# Patient Record
Sex: MALE | Race: WHITE | Employment: OTHER | URBAN - METROPOLITAN AREA
[De-identification: names, ages, dates, MRNs, and addresses within clinical notes are randomized per-mention and may not be internally consistent; named-entity substitution may affect disease eponyms.]

---

## 2018-01-19 ENCOUNTER — TELEPHONE (OUTPATIENT)
Dept: FAMILY MEDICINE | Facility: CLINIC | Age: 68
End: 2018-01-19

## 2018-01-19 ENCOUNTER — OFFICE VISIT (OUTPATIENT)
Dept: FAMILY MEDICINE | Facility: CLINIC | Age: 68
End: 2018-01-19
Payer: COMMERCIAL

## 2018-01-19 VITALS
OXYGEN SATURATION: 98 % | WEIGHT: 169 LBS | DIASTOLIC BLOOD PRESSURE: 80 MMHG | HEART RATE: 69 BPM | TEMPERATURE: 98.5 F | SYSTOLIC BLOOD PRESSURE: 136 MMHG | BODY MASS INDEX: 23.66 KG/M2 | HEIGHT: 71 IN

## 2018-01-19 DIAGNOSIS — J06.9 VIRAL URI WITH COUGH: Primary | ICD-10-CM

## 2018-01-19 PROCEDURE — 99213 OFFICE O/P EST LOW 20 MIN: CPT | Performed by: NURSE PRACTITIONER

## 2018-01-19 NOTE — NURSING NOTE
"Chief Complaint   Patient presents with     URI     sinus and productive cough yellow green abt 2 weeks.  OTC acetaminophen + decongestant        Initial /80 (Cuff Size: Adult Regular)  Pulse 69  Temp 98.5  F (36.9  C) (Oral)  Ht 5' 11\" (1.803 m)  Wt 169 lb (76.7 kg)  SpO2 98%  BMI 23.57 kg/m2 Estimated body mass index is 23.57 kg/(m^2) as calculated from the following:    Height as of this encounter: 5' 11\" (1.803 m).    Weight as of this encounter: 169 lb (76.7 kg).  Medication Reconciliation: complete    "

## 2018-01-19 NOTE — TELEPHONE ENCOUNTER
Patient started with Flu Like Symptoms 2 weeks ago.  Now sinus symptoms that will not get better.  Nasal drainage (yellow to green in color), sinus pressure, pain.  Post nasal drip.  Works over at [employer].  Recommended appt for evaluation.  Patient agrees and appt scheduled.  Yaz Blandon RN

## 2018-01-19 NOTE — TELEPHONE ENCOUNTER
Reason for call:  Patient reporting a symptom    Symptom or request: sore throat, congestions, drainage, body aches and chills     Duration (how long have symptoms been present): 2 weeks    Have you been treated for this before? No    Additional comments: Pt was a a Dr. Galarza pt, has not established care with a new provider    Phone Number patient can be reached at:  Cell number on file:    Telephone Information:   Mobile [phone]       Best Time:      Can we leave a detailed message on this number:  YES    Call taken on 1/19/2018 at 9:12 AM by Mildred Barahona

## 2018-01-19 NOTE — MR AVS SNAPSHOT
"              After Visit Summary   1/19/2018    DR José Antonio Saha    MRN: [id number]           Patient Information     Date Of Birth          1950        Visit Information        Provider Department      1/19/2018 3:00 PM Macy Zarco APRN CNP [facility]        Today's Diagnoses     Viral URI with cough    -  1       Follow-ups after your visit        Who to contact     If you have questions or need follow up information about today's clinic visit or your schedule please contact [facility] directly at [phone].  Normal or non-critical lab and imaging results will be communicated to you by Saatchi Arthart, letter or phone within 4 business days after the clinic has received the results. If you do not hear from us within 7 days, please contact the clinic through Saatchi Arthart or phone. If you have a critical or abnormal lab result, we will notify you by phone as soon as possible.  Submit refill requests through MiFi or call your pharmacy and they will forward the refill request to us. Please allow 3 business days for your refill to be completed.          Additional Information About Your Visit        MyChart Information     MiFi gives you secure access to your electronic health record. If you see a primary care provider, you can also send messages to your care team and make appointments. If you have questions, please call your primary care clinic.  If you do not have a primary care provider, please call [phone] and they will assist you.        Care EveryWhere ID     This is your Care EveryWhere ID. This could be used by other organizations to access your [place] medical records  [id number]        Your Vitals Were     Pulse Temperature Height Pulse Oximetry BMI (Body Mass Index)       69 98.5  F (36.9  C) (Oral) 5' 11\" (1.803 m) 98% 23.57 kg/m2        Blood Pressure from Last 3 Encounters:   01/19/18 136/80   09/29/16 105/71   09/08/16 106/65    Weight from Last 3 " Encounters:   01/19/18 169 lb (76.7 kg)   07/29/16 168 lb (76.2 kg)   06/12/14 168 lb (76.2 kg)              Today, you had the following     No orders found for display       Primary Care Provider Office Phone # Fax #    Blaine Lopez Iker Galarza [phone] [address] [facility] [address]  [facility] [address]        Equal Access to Services     JAMES COSME : Hadii aad ku hadasho Soomaali, waaxda luqadaha, qaybta kaalmada adeegyada, waxay idiin hayaan adeeg kharash la'josé antonio . So [facility] [phone].    ATENCIÓN: Si habla español, tiene a yuan disposición servicios gratuitos de asistencia lingüística. Llame al [phone].    We comply with applicable federal civil rights laws and [place] laws. We do not discriminate on the basis of race, color, national origin, age, disability, sex, sexual orientation, or gender identity.            Thank you!     Thank you for choosing [facility]  for your care. Our goal is always to provide you with excellent care. Hearing back from our patients is one way we can continue to improve our services. Please take a few minutes to complete the written survey that you may receive in the mail after your visit with us. Thank you!             Your Updated Medication List - Protect others around you: Learn how to safely use, store and throw away your medicines at www.disposemymeds.org.          This list is accurate as of: 1/19/18  3:56 PM.  Always use your most recent med list.                   Brand Name Dispense Instructions for use Diagnosis    NO ACTIVE MEDICATIONS

## 2018-05-03 ENCOUNTER — RADIANT APPOINTMENT (OUTPATIENT)
Dept: GENERAL RADIOLOGY | Facility: CLINIC | Age: 68
End: 2018-05-03
Attending: INTERNAL MEDICINE
Payer: COMMERCIAL

## 2018-05-03 ENCOUNTER — OFFICE VISIT (OUTPATIENT)
Dept: FAMILY MEDICINE | Facility: CLINIC | Age: 68
End: 2018-05-03
Payer: COMMERCIAL

## 2018-05-03 VITALS
SYSTOLIC BLOOD PRESSURE: 117 MMHG | DIASTOLIC BLOOD PRESSURE: 75 MMHG | HEIGHT: 71 IN | HEART RATE: 52 BPM | BODY MASS INDEX: 23.94 KG/M2 | TEMPERATURE: 96.5 F | OXYGEN SATURATION: 100 % | WEIGHT: 171 LBS

## 2018-05-03 DIAGNOSIS — M25.511 ACUTE PAIN OF RIGHT SHOULDER: ICD-10-CM

## 2018-05-03 DIAGNOSIS — M25.511 ACUTE PAIN OF RIGHT SHOULDER: Primary | ICD-10-CM

## 2018-05-03 DIAGNOSIS — Z23 NEED FOR VACCINATION: ICD-10-CM

## 2018-05-03 DIAGNOSIS — N40.1 BENIGN PROSTATIC HYPERPLASIA WITH NOCTURIA: ICD-10-CM

## 2018-05-03 DIAGNOSIS — Z12.5 SCREENING FOR PROSTATE CANCER: ICD-10-CM

## 2018-05-03 DIAGNOSIS — Z13.6 CARDIOVASCULAR SCREENING; LDL GOAL LESS THAN 160: ICD-10-CM

## 2018-05-03 DIAGNOSIS — R35.1 BENIGN PROSTATIC HYPERPLASIA WITH NOCTURIA: ICD-10-CM

## 2018-05-03 PROCEDURE — 73030 X-RAY EXAM OF SHOULDER: CPT | Mod: RT

## 2018-05-03 PROCEDURE — 99207 ZZC FOR CODING REVIEW: CPT | Mod: 25 | Performed by: INTERNAL MEDICINE

## 2018-05-03 PROCEDURE — 90471 IMMUNIZATION ADMIN: CPT | Performed by: INTERNAL MEDICINE

## 2018-05-03 PROCEDURE — 99214 OFFICE O/P EST MOD 30 MIN: CPT | Mod: 25 | Performed by: INTERNAL MEDICINE

## 2018-05-03 PROCEDURE — 90670 PCV13 VACCINE IM: CPT | Performed by: INTERNAL MEDICINE

## 2018-05-03 RX ORDER — TAMSULOSIN HYDROCHLORIDE 0.4 MG/1
0.4 CAPSULE ORAL DAILY
Qty: 90 CAPSULE | Refills: 3 | Status: SHIPPED | OUTPATIENT
Start: 2018-05-03 | End: 2019-11-20

## 2018-05-03 NOTE — NURSING NOTE
"Chief Complaint   Patient presents with     Musculoskeletal Problem       Initial /75 (BP Location: Left arm, Patient Position: Sitting, Cuff Size: Adult Regular)  Pulse 52  Temp 96.5  F (35.8  C) (Oral)  Ht 5' 11\" (1.803 m)  Wt 171 lb (77.6 kg)  SpO2 100%  BMI 23.85 kg/m2 Estimated body mass index is 23.85 kg/(m^2) as calculated from the following:    Height as of this encounter: 5' 11\" (1.803 m).    Weight as of this encounter: 171 lb (77.6 kg).  Medication Reconciliation: complete    Richard Marsh CMA on 5/3/2018 at 12:37 PM    "

## 2018-05-03 NOTE — PROGRESS NOTES
SUBJECTIVE:   José Antonio Saha is a 68 year old male who presents to clinic today for the following health issues:      Musculoskeletal problem/pain      Duration: started early march    Description  Location: Right shoulder    Intensity:  mild    Accompanying signs and symptoms: none    History  Previous similar problem: YES- same time of year  Previous evaluation:  none    Precipitating or alleviating factors:  Trauma or overuse: YES- overuse  Aggravating factors include: work    Therapies tried and outcome: immobilization and Ibuprofen    Pt requesting PSA screen due to family history of prostatic carcinoma.    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  The pain came on after falling on outstretched right arm while cross-country skiing.  The pain seems to be noticeable when he puts weight on his right arm on his bicycle handlebars.  The pain does not seem to worsen with aerobic exertion.      Patient is a family history of prostate cancer and requests PSA screening.  He also notes progressive, now severe nocturia symptoms that have been progressing over more than 5 years.  He gets up 1 or 2 times per night to urinate.  He is interested in drug therapy to help with this.  He describes a normal urine stream and denies dysuria or hematuria.  He tries to limit liquids late in the evening.      Problem list and histories reviewed & adjusted, as indicated.  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     C NONSPECIFIC PROCEDURE      multiple skin Bxs     C NONSPECIFIC PROCEDURE      tonsillectomy     COLONOSCOPY  11/19/2013    Procedure: COMBINED COLONOSCOPY, SINGLE BIOPSY/POLYPECTOMY BY BIOPSY;  COLONOSCOPY;  Surgeon: Adam Lopez MD;  Location:  GI     COLONOSCOPY N/A 9/29/2016    Procedure: COLONOSCOPY;  Surgeon: Paul Ken MD;  Location:  GI       Social History   Substance Use Topics     Smoking status: Never Smoker     Smokeless tobacco: Never Used     Alcohol use 0.0 oz/week     0 Standard drinks or equivalent per week      Comment: 2  drinks per week     Family History   Problem Relation Age of Onset     CANCER Mother      thyroid and lung     Prostate Cancer Father 65     uncle also had prostate Ca     CEREBROVASCULAR DISEASE Father      Hypertension Father      Arthritis Other      Breast Cancer Sister      Cancer - colorectal No family hx of      DIABETES No family hx of          Current Outpatient Prescriptions   Medication Sig Dispense Refill     NO ACTIVE MEDICATIONS        tamsulosin (FLOMAX) 0.4 MG capsule Take 1 capsule (0.4 mg) by mouth daily 90 capsule 3     Allergies   Allergen Reactions     No Known Drug Allergies        Reviewed and updated as needed this visit by clinical staff       Reviewed and updated as needed this visit by Provider         ROS:  Constitutional, HEENT, cardiovascular, pulmonary, gi and gu systems are negative, except as otherwise noted.    OBJECTIVE:     /75 (BP Location: Left arm, Patient Position: Sitting, Cuff Size: Adult Regular)  Pulse 52  Temp 96.5  F (35.8  C) (Oral)  Ht 5' 11\" (1.803 m)  Wt 171 lb (77.6 kg)  SpO2 100%  BMI 23.85 kg/m2  Body mass index is 23.85 kg/(m^2).  General: This is a well-appearing middle-aged man in no acute distress.  He is physically fit.  Shoulder: There is no bony tenderness over the structures of the right shoulder " joint, there is no tenderness with palpation over the acromioclavicular joint.  There is a positive impingement sign of the right shoulder.  Muscle strength of the muscles of the rotator cuff is intact on the right shoulder.    X-ray of the right shoulder and labs are pending    ASSESSMENT/PLAN:       1. Acute pain of right shoulder  Suspect rotator cuff syndrome of the right shoulder joint which will improve with physical therapy.  X-ray to exclude fracture or metastatic disease although this is very unlikely.  Hopefully, symptoms will improve with several sessions of physical therapy.  Patient will notify me if symptoms are not improved after 3-4 weeks of therapy to schedule MRI to further evaluate if needed.  - [facility] HAND, AND CHIROPRACTIC REFERRAL  - XR Shoulder Right 2 Views; Future    2. Benign prostatic hyperplasia with nocturia  Discussed risks and side effects of Flomax to help with nocturia, after discussion, patient would like to try Flomax  - tamsulosin (FLOMAX) 0.4 MG capsule; Take 1 capsule (0.4 mg) by mouth daily  Dispense: 90 capsule; Refill: 3    3. CARDIOVASCULAR SCREENING; LDL GOAL LESS THAN 160  He would like to return for fasting screening laboratory tests  - Comprehensive metabolic panel; Future  - CBC with platelets; Future  - Lipid panel reflex to direct LDL Fasting; Future    4. Screening for prostate cancer    - Prostate spec antigen screen; Future    FUTURE APPOINTMENTS:       - Pending labs and symptoms as above    Prevnar 13 today, she works was recommended  Kirk Chavez MD  [facility]

## 2018-05-03 NOTE — NURSING NOTE
Screening Questionnaire for Adult Immunization    Are you sick today?   No   Do you have allergies to medications, food, a vaccine component or latex?   No   Have you ever had a serious reaction after receiving a vaccination?   No   Do you have a long-term health problem with heart disease, lung disease, asthma, kidney disease, metabolic disease (e.g. diabetes), anemia, or other blood disorder?   No   Do you have cancer, leukemia, HIV/AIDS, or any other immune system problem?   No   In the past 3 months, have you taken medications that affect  your immune system, such as prednisone, other steroids, or anticancer drugs; drugs for the treatment of rheumatoid arthritis, Crohn s disease, or psoriasis; or have you had radiation treatments?   No   Have you had a seizure, or a brain or other nervous system problem?   No   During the past year, have you received a transfusion of blood or blood     products, or been given immune (gamma) globulin or antiviral drug?   No   For women: Are you pregnant or is there a chance you could become        pregnant during the next month?   No   Have you received any vaccinations in the past 4 weeks?   No     Immunization questionnaire answers were all negative.        Per orders of Dr. Chavez, injection of Prevnar 13 given by Richard Marsh. Patient instructed to remain in clinic for 15 minutes afterwards, and to report any adverse reaction to me immediately.       Screening performed by Richard Marsh on 5/3/2018 at 1:24 PM.    Prior to injection verified patient identity using patient's name and date of birth.  Due to injection administration, patient instructed to remain in clinic for 15 minutes  afterwards, and to report any adverse reaction to me immediately.

## 2018-05-03 NOTE — MR AVS SNAPSHOT
"              After Visit Summary   5/3/2018    DR Chou ZHANNA Saha    MRN: [id number]           Patient Information     Date Of Birth          1950        Visit Information        Provider Department      5/3/2018 12:30 PM Kirk Chavez MD [facility]        Today's Diagnoses     Benign prostatic hyperplasia with nocturia    -  1    Acute pain of right shoulder        CARDIOVASCULAR SCREENING; LDL GOAL LESS THAN 160        Screening for prostate cancer          Care Instructions    You should get the new shingles vaccine \"SHINGRIX\" (not Zostavax) at your pharmacy.             Follow-ups after your visit        Additional Services     [facility] PT, HAND, AND CHIROPRACTIC REFERRAL       **This order will print in the [facility] Scheduling Office**    Physical Therapy, Hand Therapy and Chiropractic Care are available through:    *[facility]  *[facility]  *[facility]    Call one number to schedule at any of the above locations: [phone].    Your provider has referred you to: Physical Therapy at [facility] or [facility]    Indication/Reason for Referral: Shoulder Pain  Onset of Illness: Months ago   Therapy Orders: Evaluate and Treat  Special Programs:   Special Request:     Marilyn Sanders      Additional Comments for the Therapist or Chiropractor:     Please be aware that coverage of these services is subject to the terms and limitations of your health insurance plan.  Call member services at your health plan with any benefit or coverage questions.      Please bring the following to your appointment:    *Your personal calendar for scheduling future appointments  *Comfortable clothing                  Follow-up notes from your care team     Return for Lab Work.      Your next 10 appointments already scheduled     May 07, 2018  8:00 AM CDT   (Arrive by 7:45 AM)   [facility] Chiropractor with CAMILLE Dorman ([facility]  )    [address] [facility] [address] [facility] [id number]  [place] [address]   [phone]              Future tests that were ordered for you today     Open Future Orders        Priority Expected Expires Ordered    Comprehensive metabolic panel Routine  5/3/2019 5/3/2018    CBC with platelets Routine  5/3/2019 5/3/2018    Prostate spec antigen screen Routine  5/3/2019 5/3/2018    Lipid panel reflex to direct LDL Fasting Routine  5/3/2019 5/3/2018    XR Shoulder Right 2 Views Routine 5/3/2018 5/3/2019 5/3/2018            Who to contact     If you have questions or need follow up information about today's clinic visit or your schedule please contact [facility] directly at [phone].  Normal or non-critical lab and imaging results will be communicated to you by [email or web address], letter or phone within 4 business days after the clinic has received the results. If you do not hear from us within 7 days, please contact the clinic through EcoloCapt or phone. If you have a critical or abnormal lab result, we will notify you by phone as soon as possible.  Submit refill requests through SwipeStation or call your pharmacy and they will forward the refill request to us. Please allow 3 business days for your refill to be completed.          Additional Information About Your Visit        SwipeStation Information     SwipeStation gives you secure access to your electronic health record. If you see a primary care provider, you can also send messages to your care team and make appointments. If you have questions, please call your primary care clinic.  If you do not have a primary care provider, please call [phone] and they will assist you.        Care EveryWhere ID     This is your Care EveryWhere ID. This could be used by other organizations to access your [place] medical records  [id number]        Your Vitals Were     Pulse Temperature Height Pulse Oximetry BMI (Body Mass Index)       52 96.5  F (35.8  C) (Oral) 5' 11\" (1.803 m) 100% 23.85 kg/m2        Blood Pressure from Last 3 " Encounters:   05/03/18 117/75   01/19/18 136/80   09/29/16 105/71    Weight from Last 3 Encounters:   05/03/18 171 lb (77.6 kg)   01/19/18 169 lb (76.7 kg)   07/29/16 168 lb (76.2 kg)              We Performed the Following     [facility] PT, HAND, AND CHIROPRACTIC REFERRAL          Today's Medication Changes          These changes are accurate as of 5/3/18  1:09 PM.  If you have any questions, ask your nurse or doctor.               Start taking these medicines.        Dose/Directions    tamsulosin 0.4 MG capsule   Commonly known as:  FLOMAX   Used for:  Benign prostatic hyperplasia with nocturia   Started by:  Kirk Chavez MD        Dose:  0.4 mg   Take 1 capsule (0.4 mg) by mouth daily   Quantity:  90 capsule   Refills:  3            Where to get your medicines      These medications were sent to [facility], [place] - [address], [address]  [address], [address], [facility] [address]     Phone:  [phone]     tamsulosin 0.4 MG capsule                Primary Care Provider Office Phone # Fax #    Gyjqcqav John Galarza [phone] [address]  [facility] [address]        Equal Access to Services     JAMES COSME AH: Hadii aad ku hadasho Soomaali, waaxda luqadaha, qaybta kaalmada adeegyada, waxay radhain hayjosé antonio conde . So [facility] [phone].    ATENCIÓN: Si habla español, tiene a yuan disposición servicios gratuitos de asistencia lingüística. Llame al [phone].    We comply with applicable federal civil rights laws and [place] laws. We do not discriminate on the basis of race, color, national origin, age, disability, sex, sexual orientation, or gender identity.            Thank you!     Thank you for choosing [facility]  for your care. Our goal is always to provide you with excellent care. Hearing back from our patients is one way we can continue to improve our services. Please take a few minutes to complete the  written survey that you may receive in the mail after your visit with us. Thank you!             Your Updated Medication List - Protect others around you: Learn how to safely use, store and throw away your medicines at www.disposemymeds.org.          This list is accurate as of 5/3/18  1:09 PM.  Always use your most recent med list.                   Brand Name Dispense Instructions for use Diagnosis    NO ACTIVE MEDICATIONS           tamsulosin 0.4 MG capsule    FLOMAX    90 capsule    Take 1 capsule (0.4 mg) by mouth daily    Benign prostatic hyperplasia with nocturia

## 2018-05-05 NOTE — PROGRESS NOTES
The following letter pertains to your most recent diagnostic tests:    Good news! Your shoulder does not show any other dangerous causes for your shoulder pain and confirms our hypothesis that rotator cuff tendinitis is the cause for your symptoms.  I would recommend a few sessions of PT for this problem.  If the PT fails to address your symptoms, then please contact me to arrange for a possible MRI or cortisone injection as symptoms dictate.       Sincerely,    Dr. Chavez

## 2018-05-07 ENCOUNTER — THERAPY VISIT (OUTPATIENT)
Dept: CHIROPRACTIC MEDICINE | Facility: CLINIC | Age: 68
End: 2018-05-07
Payer: COMMERCIAL

## 2018-05-07 DIAGNOSIS — M25.511 RIGHT SHOULDER PAIN, UNSPECIFIED CHRONICITY: Primary | ICD-10-CM

## 2018-05-07 DIAGNOSIS — M99.01 CERVICAL SEGMENT DYSFUNCTION: ICD-10-CM

## 2018-05-07 DIAGNOSIS — M75.91 RIGHT SUPRASPINATUS TENDINITIS: ICD-10-CM

## 2018-05-07 DIAGNOSIS — M99.02 THORACIC SEGMENT DYSFUNCTION: ICD-10-CM

## 2018-05-07 PROCEDURE — 99203 OFFICE O/P NEW LOW 30 MIN: CPT | Mod: 25 | Performed by: CHIROPRACTOR

## 2018-05-07 PROCEDURE — 97035 APP MDLTY 1+ULTRASOUND EA 15: CPT | Performed by: CHIROPRACTOR

## 2018-05-07 PROCEDURE — 98940 CHIROPRACT MANJ 1-2 REGIONS: CPT | Mod: AT | Performed by: CHIROPRACTOR

## 2018-05-08 ENCOUNTER — ALLIED HEALTH/NURSE VISIT (OUTPATIENT)
Dept: NURSING | Facility: CLINIC | Age: 68
End: 2018-05-08
Payer: COMMERCIAL

## 2018-05-08 DIAGNOSIS — Z23 NEED FOR VACCINATION: Primary | ICD-10-CM

## 2018-05-08 DIAGNOSIS — Z13.6 CARDIOVASCULAR SCREENING; LDL GOAL LESS THAN 160: ICD-10-CM

## 2018-05-08 DIAGNOSIS — Z12.5 SCREENING FOR PROSTATE CANCER: ICD-10-CM

## 2018-05-08 LAB
ALBUMIN SERPL-MCNC: 4.1 G/DL (ref 3.4–5)
ALP SERPL-CCNC: 45 U/L (ref 40–150)
ALT SERPL W P-5'-P-CCNC: 35 U/L (ref 0–70)
ANION GAP SERPL CALCULATED.3IONS-SCNC: 8 MMOL/L (ref 3–14)
AST SERPL W P-5'-P-CCNC: 25 U/L (ref 0–45)
BILIRUB SERPL-MCNC: 0.7 MG/DL (ref 0.2–1.3)
BUN SERPL-MCNC: 18 MG/DL (ref 7–30)
CALCIUM SERPL-MCNC: 9 MG/DL (ref 8.5–10.1)
CHLORIDE SERPL-SCNC: 109 MMOL/L (ref 94–109)
CHOLEST SERPL-MCNC: 191 MG/DL
CO2 SERPL-SCNC: 25 MMOL/L (ref 20–32)
CREAT SERPL-MCNC: 0.94 MG/DL (ref 0.66–1.25)
ERYTHROCYTE [DISTWIDTH] IN BLOOD BY AUTOMATED COUNT: 14.4 % (ref 10–15)
GFR SERPL CREATININE-BSD FRML MDRD: 81 ML/MIN/1.7M2
GLUCOSE SERPL-MCNC: 102 MG/DL (ref 70–99)
HCT VFR BLD AUTO: 43 % (ref 40–53)
HDLC SERPL-MCNC: 80 MG/DL
HGB BLD-MCNC: 14.7 G/DL (ref 13.3–17.7)
LDLC SERPL CALC-MCNC: 99 MG/DL
MCH RBC QN AUTO: 32.7 PG (ref 26.5–33)
MCHC RBC AUTO-ENTMCNC: 34.2 G/DL (ref 31.5–36.5)
MCV RBC AUTO: 96 FL (ref 78–100)
NONHDLC SERPL-MCNC: 111 MG/DL
PLATELET # BLD AUTO: 297 10E9/L (ref 150–450)
POTASSIUM SERPL-SCNC: 4.5 MMOL/L (ref 3.4–5.3)
PROT SERPL-MCNC: 7.7 G/DL (ref 6.8–8.8)
PSA SERPL-ACNC: 6.16 UG/L (ref 0–4)
RBC # BLD AUTO: 4.49 10E12/L (ref 4.4–5.9)
SODIUM SERPL-SCNC: 142 MMOL/L (ref 133–144)
TRIGL SERPL-MCNC: 60 MG/DL
WBC # BLD AUTO: 4.8 10E9/L (ref 4–11)

## 2018-05-08 PROCEDURE — 36415 COLL VENOUS BLD VENIPUNCTURE: CPT | Performed by: INTERNAL MEDICINE

## 2018-05-08 PROCEDURE — 90750 HZV VACC RECOMBINANT IM: CPT

## 2018-05-08 PROCEDURE — 90471 IMMUNIZATION ADMIN: CPT

## 2018-05-08 PROCEDURE — 80061 LIPID PANEL: CPT | Performed by: INTERNAL MEDICINE

## 2018-05-08 PROCEDURE — 99207 ZZC NO CHARGE NURSE ONLY: CPT

## 2018-05-08 PROCEDURE — 85027 COMPLETE CBC AUTOMATED: CPT | Performed by: INTERNAL MEDICINE

## 2018-05-08 PROCEDURE — 80053 COMPREHEN METABOLIC PANEL: CPT | Performed by: INTERNAL MEDICINE

## 2018-05-08 PROCEDURE — G0103 PSA SCREENING: HCPCS | Performed by: INTERNAL MEDICINE

## 2018-05-08 NOTE — PROGRESS NOTES
Chiropractic Clinic Visit    PCP: Kirk Chavez    José Antonio Saha is a 68 year old male who is seen  as a self referral presenting with acute R shoulder pain . Patient reports that the onset was early March 2018.  When asked, patient denies:, falling, slipping, bending and reaching or sleeping awkwardly. The pt reports R shoulder pain that started due to an overuse injury of the R shoulder followed by biking and Nordic skiing which hollie the R shoulder. The px is located in the R lateral shoulder area and does not radiate. He also uses a microscope during the day which requires him to use fine and repetitious movements in the R shoulder area causing pain in the lateral shoulder area. When the patient bikes or skis he notes moderate pain in the shoulder area with jarring motions. The px is graded a 1-4/10 on VAS. Sleeping on either side tends to irritate the shoulder as does using the R shoulder. The pt denies weakness in the extremities or other unusual sx.  Prior to onset, the patient was able to sleep on either side. Patient notes that due to symptoms, they cannot bike or ski due to pain in the R shoulder . José Antonio Saha notes using a microscope rated at a 4/10 and  prior to this onset it was 1/10.    Injury: There was no injury related to this episode     Location of Pain: right shoulder at the following level(s) C7 , T1 , T5  and T8   Duration of Pain: March 2018   Rating of Pain at worst: 4/10  Rating of Pain Currently: 1/10  Symptoms are better with: Nothing  Symptoms are worse with: repetitious motions, skiing, biking  Additional Features: occasional neck pain       Health History  as reported by the patient:    How does the patient rate their own health:   Excellent    Current or past medical history:   Cancer    Medical allergies  None    Past Traumas/Surgeries  Cancer:  Skin biopsies     Family History  Family History   Problem Relation Age of Onset     CANCER Mother      thyroid and lung      Prostate Cancer Father 65     uncle also had prostate Ca     CEREBROVASCULAR DISEASE Father      Hypertension Father      Arthritis Other      Breast Cancer Sister      Cancer - colorectal No family hx of      DIABETES No family hx of        Medications:  None    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    Past Medical History:   Diagnosis Date     Basal cell carcinoma      Malignant melanoma of other specified sites of skin 1980s    no recurrence     Personal history of other malignant neoplasm of skin 4/15/02    recurrent BCC scalp and face     Routine general medical examination at a [facility] facility 4/12/02     Scrotal varices     L scrotum     Past Surgical History:   Procedure Laterality Date     C NONSPECIFIC PROCEDURE  6/27/02    colonoscopy with polypectomy x3, fulguration x1     C NONSPECIFIC PROCEDURE      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     C NONSPECIFIC PROCEDURE      multiple skin Bxs     C NONSPECIFIC PROCEDURE      tonsillectomy     COLONOSCOPY  11/19/2013    Procedure: COMBINED COLONOSCOPY, SINGLE BIOPSY/POLYPECTOMY BY BIOPSY;  COLONOSCOPY;  Surgeon: Adam Lopez MD;  Location:  GI     COLONOSCOPY N/A 9/29/2016    Procedure: COLONOSCOPY;  Surgeon: Paul Ken MD;  Location:  GI       Objective  There were no vitals taken for this visit.    GENERAL APPEARANCE: healthy, alert and no distress   GAIT: NORMAL  SKIN: no suspicious lesions or rashes  NEURO: Normal strength and tone, mentation intact and speech normal  PSYCH:  mentation appears normal and affect normal/bright    José Antonio was asked to complete the Neck Disability Index, today in the office. NDI Disability score: pending%; pain  severity scale: 4/10..    Cervical Spine Exam    Range of Motion:         Full active and passive ROM forward flexion,  Decreased  extension, lateral rotation, lateral flexion.    Inspection:         No visible deformity        normal lordotic curvature maintained  Slumped seated posture, anterior head carriage, poor posture    Tender:        upper border of trapezius       R longus coli, R subscapularis, R supraspinatus tendon    Non-Tender:        remainder of cervical spine area    Muscle strength:       C4 (shoulder shrug)  symmetric 5/5 Normal       C5 (shoulder abduction) symmetric 5/5 Normal       C6 (elbow flexion) symmetric 5/5 Normal       C7 (elbow extension) symmetric 5/5 Normal       C8 (finger abduction, thumb flexion) symmetric 5/5 Normal    Reflexes:        C5 (biceps) symmetric 2 bilaterally       C6 (supinator) symmetric 2 bilaterally       C7 (triceps) symmetric 2 bilaterally    Sensation:       grossly intact througout bilateral upper extremities    Special Tests:       positive (+) Spurling  Gagan's- positive, VBI- negative and Schaeffer Aragon - negative    Lymphatics:        no edema noted in the upper extremities       Segmental spinal dysfunction/restrictions found at:C7 , T1 , T5  and T8   .      The following soft tissue hypotonicities were observed:Lev scapulae: ache and dull pain, referred pain: no  Sub-occiput: ache and dull pain, referred pain: no    Trigger points were found in:none     Muscle spasm found in:Levator scapulae, Sub-occipital and subscapularis:       Radiology:  None     Assessment:    1. Right shoulder pain, unspecified chronicity    2. Cervical segment dysfunction    3. Right supraspinatus tendinitis    4. Thoracic segment dysfunction        RX ordered/plan of care  Anticipated outcomes  Possible risks and side effects    After discussing the risk and benefits of care, patient consented to treatment    Patient's condition:  Patient had restrictions  pre-manipulation    Treatment effectiveness:  Post manipulation there is better intersegmental movement and Patient claims to feel looser post manipulation    Plan:    Procedures:    Evaluation and Management:  52473 Moderate level exam 30 min    CMT:  06430 Chiropractic manipulative treatment 1-2 regions performed   Cervical: Diversified, C2, C7 , Prone, Supine  Thoracic: Diversified, T1, T5, T8, Prone    Modalities:  59618: US:  1.5 Castelan/cm squared for 8 minutes at 1 mhz, Location: R supraspinatus     Therapeutic procedures:  None    Response to Treatment  No increase in sx, pt stable     Prognosis: Good      Treatment plan and goals:  Goals:  SLEEPING: the patient specific goal is to attain his pre-injury status of 6-8 hours comfortably on either side  Moving the slide on the microscope     Frequency of care  Duration of care is estimated to be 4-6 weeks, from the initial treatment.  It is estimated that the patient will need a total of 4-6 visits to resolve this episode.  For the initial therapeutic trial of care, the frequency is recommended at 6 X week, once daily.  A reevaluation would be clinically appropriate in 6 visits, to determine progress and further course of care.    In-Office Treatment  Evaluation  Spinal Chiropractic Manipulative Therapy  US therapy  ACP discussion      Recommendations:    Instructions:none     Follow-up:  Return to care in one week with US   Postural correction  Stretching .     Disclaimer: This note consists of symbols derived from keyboarding, dictation and/or voice recognition software. As a result, there may be errors in the script that have gone undetected. Please consider this when interpreting information found in this chart.

## 2018-05-08 NOTE — PROGRESS NOTES
The following letter pertains to your most recent diagnostic tests:    -Your PSA is mildly elevated.  Most dangerous prostate cancers present with PSA greater than 10.  This mildly elevated result could be from mild inflammation of the prostate gland or it could be a spurious value.  I would recommend that we recheck the PSA in 4-6 weeks.  If the PSA at that time returns to normal or decreases, there is likely little to worry about.  If the trend of the PSA is upward, then we should arrange a urology consultation to discuss further evaluation.       -Liver and gallbladder tests are normal for you. (ALT,AST, Alk phos, bilirubin), kidney function is normal for you (Creatinine, GFR), Sodium is normal, Potassium is normal for you, Calcium is normal for you, Glucose (blood sugar) is essentially normal for you.      -Your cholesterol panel looks healthy.     -Your complete blood counts including your hemoglobin returned normal for you.         Bottom line:  We should recheck your PSA in 4-6 weeks.  The remainder of your lab results look really healthy.        Follow up:  Lab appointment 4-6 weeks for follow up PSA level.        Sincerely,    Dr. Chavez

## 2018-05-08 NOTE — LETTER
May 10, 2018      José Antonio Saha  [address] [place] [address]        Dear DrewYifan,    We are writing to inform you of your test results.    The following letter pertains to your most recent diagnostic tests:     -Your PSA is mildly elevated.  Most dangerous prostate cancers present with PSA greater than 10.  This mildly elevated result could be from mild inflammation of the prostate gland or it could be a spurious value.  I would recommend that we recheck the PSA in 4-6 weeks.  If the PSA at that time returns to normal or decreases, there is likely little to worry about.  If the trend of the PSA is upward, then we should arrange a urology consultation to discuss further evaluation.       -Liver and gallbladder tests are normal for you. (ALT,AST, Alk phos, bilirubin), kidney function is normal for you (Creatinine, GFR), Sodium is normal, Potassium is normal for you, Calcium is normal for you, Glucose (blood sugar) is essentially normal for you.       -Your cholesterol panel looks healthy.     -Your complete blood counts including your hemoglobin returned normal for you.         Bottom line:  We should recheck your PSA in 4-6 weeks.  The remainder of your lab results look really healthy.         Follow up:  Lab appointment 4-6 weeks for follow up PSA level.       Resulted Orders   Comprehensive metabolic panel   Result Value Ref Range    Sodium 142 133 - 144 mmol/L    Potassium 4.5 3.4 - 5.3 mmol/L    Chloride 109 94 - 109 mmol/L    Carbon Dioxide 25 20 - 32 mmol/L    Anion Gap 8 3 - 14 mmol/L    Glucose 102 (H) 70 - 99 mg/dL    Urea Nitrogen 18 7 - 30 mg/dL    Creatinine 0.94 0.66 - 1.25 mg/dL    GFR Estimate 81 >60 mL/min/1.7m2      Comment:      Non  GFR Calc    GFR Estimate If Black >90 >60 mL/min/1.7m2      Comment:       GFR Calc    Calcium 9.0 8.5 - 10.1 mg/dL    Bilirubin Total 0.7 0.2 - 1.3 mg/dL    Albumin 4.1 3.4 - 5.0 g/dL    Protein Total 7.7 6.8 - 8.8 g/dL     Alkaline Phosphatase 45 40 - 150 U/L    ALT 35 0 - 70 U/L    AST 25 0 - 45 U/L   CBC with platelets   Result Value Ref Range    WBC 4.8 4.0 - 11.0 10e9/L    RBC Count 4.49 4.4 - 5.9 10e12/L    Hemoglobin 14.7 13.3 - 17.7 g/dL    Hematocrit 43.0 40.0 - 53.0 %    MCV 96 78 - 100 fl    MCH 32.7 26.5 - 33.0 pg    MCHC 34.2 31.5 - 36.5 g/dL    RDW 14.4 10.0 - 15.0 %    Platelet Count 297 150 - 450 10e9/L   Prostate spec antigen screen   Result Value Ref Range    PSA 6.16 (H) 0 - 4 ug/L      Comment:      Assay Method:  Chemiluminescence using Siemens Vista analyzer   Lipid panel reflex to direct LDL Fasting   Result Value Ref Range    Cholesterol 191 <200 mg/dL    Triglycerides 60 <150 mg/dL    HDL Cholesterol 80 >39 mg/dL    LDL Cholesterol Calculated 99 <100 mg/dL      Comment:      Desirable:       <100 mg/dl    Non HDL Cholesterol 111 <130 mg/dL       If you have any questions or concerns, please call the clinic at the number listed above.       Sincerely,        [facility]

## 2018-05-14 ENCOUNTER — THERAPY VISIT (OUTPATIENT)
Dept: CHIROPRACTIC MEDICINE | Facility: CLINIC | Age: 68
End: 2018-05-14
Payer: COMMERCIAL

## 2018-05-14 DIAGNOSIS — M99.02 THORACIC SEGMENT DYSFUNCTION: ICD-10-CM

## 2018-05-14 DIAGNOSIS — M25.511 RIGHT SHOULDER PAIN, UNSPECIFIED CHRONICITY: Primary | ICD-10-CM

## 2018-05-14 DIAGNOSIS — M99.01 CERVICAL SEGMENT DYSFUNCTION: ICD-10-CM

## 2018-05-14 DIAGNOSIS — M75.91 RIGHT SUPRASPINATUS TENDINITIS: ICD-10-CM

## 2018-05-14 PROCEDURE — 98940 CHIROPRACT MANJ 1-2 REGIONS: CPT | Mod: AT | Performed by: CHIROPRACTOR

## 2018-05-14 PROCEDURE — 97035 APP MDLTY 1+ULTRASOUND EA 15: CPT | Performed by: CHIROPRACTOR

## 2018-05-14 NOTE — MR AVS SNAPSHOT
After Visit Summary   5/14/2018    DR José Antonio CHAO Yifan    MRN: [id number]           Patient Information     Date Of Birth          1950        Visit Information        Provider Department      5/14/2018 10:00 AM Elena Simon DC [facility]        Today's Diagnoses     Right shoulder pain, unspecified chronicity    -  1    Cervical segment dysfunction        Right supraspinatus tendinitis        Thoracic segment dysfunction           Follow-ups after your visit        Your next 10 appointments already scheduled     May 21, 2018 12:45 PM CDT   [facility] ([facility]  )    [address]  [place] [address]   [phone]            Jun 04, 2018  8:00 AM CDT   [facility] ([facility] [place]  )    [address]  [place] [address]   [phone]              Who to contact     If you have questions or need follow up information about today's clinic visit or your schedule please contact [facility] directly at [phone].  Normal or non-critical lab and imaging results will be communicated to you by MyChart, letter or phone within 4 business days after the clinic has received the results. If you do not hear from us within 7 days, please contact the clinic through [employer] or phone. If you have a critical or abnormal lab result, we will notify you by phone as soon as possible.  Submit refill requests through [employer] or call your pharmacy and they will forward the refill request to us. Please allow 3 business days for your refill to be completed.          Additional Information About Your Visit        [employer] Information     [employer] gives you secure access to your electronic health record. If you see a primary care provider, you can also send messages to your care team and make appointments. If you have questions, please call your primary care clinic.  If you do  not have a primary care provider, please call [phone] and they will assist you.        Care EveryWhere ID     This is your Care EveryWhere ID. This could be used by other organizations to access your [place] medical records  [id number]         Blood Pressure from Last 3 Encounters:   05/03/18 117/75   01/19/18 136/80   09/29/16 105/71    Weight from Last 3 Encounters:   05/03/18 77.6 kg (171 lb)   01/19/18 76.7 kg (169 lb)   07/29/16 76.2 kg (168 lb)              We Performed the Following     CHIROPRAC MANIP,SPINAL,1-2 REGIONS     ULTRASOUND THERAPY        Primary Care Provider Office Phone # Fax #    Kirk Chavez [phone] [address] [place] [address]        Equal Access to Services     [facility]: Hadii aad ku hadasho Soomaali, waaxda luqadaha, qaybta kaalmada adeegyada, karol brownlee hayjosé antonio conde . So [facility] [phone].    ATENCIÓN: Si habla español, tiene a yuan disposición servicios gratuitos de asistencia lingüística. Llame al [phone].    We comply with applicable federal civil rights laws and [place] laws. We do not discriminate on the basis of race, color, national origin, age, disability, sex, sexual orientation, or gender identity.            Thank you!     Thank you for choosing [facility]  for your care. Our goal is always to provide you with excellent care. Hearing back from our patients is one way we can continue to improve our services. Please take a few minutes to complete the written survey that you may receive in the mail after your visit with us. Thank you!             Your Updated Medication List - Protect others around you: Learn how to safely use, store and throw away your medicines at www.disposemymeds.org.          This list is accurate as of 5/14/18  5:05 PM.  Always use your most recent med list.                   Brand Name Dispense Instructions for use Diagnosis    tamsulosin 0.4 MG capsule    FLOMAX    90  capsule    Take 1 capsule (0.4 mg) by mouth daily    Benign prostatic hyperplasia with nocturia

## 2018-05-14 NOTE — PROGRESS NOTES
Visit #:  2    Subjective:  José Antonio Saha is a 68 year old male who is seen in f/u up for:        Right shoulder pain, unspecified chronicity  Cervical segment dysfunction  Right supraspinatus tendinitis  Thoracic segment dysfunction.     Since last visit on 5/7/2018,  José Antonio Saha reports:    Area of chief complaint:  Cervical and Thoracic :  Symptoms are graded at 2/10. The quality is described as stiff, achey, dull.  Motion has increased, but is still not normal. The pt reports slight improvement from the initial therapy session. He denied pain when riding his bike and performing repetitious activities at work. Today he notes pain in the lateral R shoulder at the supraspinatus tendon. Patient feels that they are improved due to a reduction in symptoms.     Since last visit the patient feels that they are 10 percent  improved from last visit.       Objective:  The following was observed:    P: pain elicited on palpation, C7/T1, T8, T9  A: static palpation demonstrates intersegmental asymmetry   R: motion palpation notes restricted motion  T: hypertonicity at:Levator scapulae and T-spine paraspinal R>>L    Segmental spinal dysfunction/restrictions found at:  C7/T1, T8, T9      Assessment:    Diagnoses:      1. Right shoulder pain, unspecified chronicity    2. Cervical segment dysfunction    3. Right supraspinatus tendinitis    4. Thoracic segment dysfunction        Patient's condition:  Patient had restrictions pre-manipulation    Treatment effectiveness:  Post manipulation there is better intersegmental movement and Patient claims to feel looser post manipulation      Procedures:  CMT:  92036 Chiropractic manipulative treatment 1-2 regions performed   Cervical: Diversified, C2, C7 , Prone, Supine  Thoracic: Diversified, , Prone    Modalities:  33756: US:  1.5 Castelan/cm squared for 8  minutes at 1 mhz   Location: R subscapularis, R biceps tendon, R pectoralis minor    Therapeutic  procedures:  None    Response to Treatment  Reduction in symptoms as reported by patient    Prognosis: Good    Progress towards Goals: Patient is making progress towards the goal.Goals:  SLEEPING: the patient specific goal is to attain his pre-injury status of 6-8 hours comfortably on either side  Moving the slide on the microscope        Recommendations:    Instructions:none     Follow-up:  Return to care in 1 week with US therapy  pec stretch*  Biceps*  External shoulder rotation*

## 2018-05-21 ENCOUNTER — THERAPY VISIT (OUTPATIENT)
Dept: CHIROPRACTIC MEDICINE | Facility: CLINIC | Age: 68
End: 2018-05-21
Payer: COMMERCIAL

## 2018-05-21 DIAGNOSIS — M99.02 THORACIC SEGMENT DYSFUNCTION: ICD-10-CM

## 2018-05-21 DIAGNOSIS — M25.511 RIGHT SHOULDER PAIN, UNSPECIFIED CHRONICITY: Primary | ICD-10-CM

## 2018-05-21 DIAGNOSIS — M75.91 RIGHT SUPRASPINATUS TENDINITIS: ICD-10-CM

## 2018-05-21 DIAGNOSIS — M99.01 CERVICAL SEGMENT DYSFUNCTION: ICD-10-CM

## 2018-05-21 PROCEDURE — 97110 THERAPEUTIC EXERCISES: CPT | Performed by: CHIROPRACTOR

## 2018-05-21 PROCEDURE — 97810 ACUP 1/> WO ESTIM 1ST 15 MIN: CPT | Mod: GA | Performed by: CHIROPRACTOR

## 2018-05-21 PROCEDURE — 98940 CHIROPRACT MANJ 1-2 REGIONS: CPT | Mod: AT | Performed by: CHIROPRACTOR

## 2018-05-23 NOTE — PROGRESS NOTES
Visit #:  3    Subjective:  José Antonio Saha is a 68 year old male who is seen in f/u up for:        Right shoulder pain, unspecified chronicity  Cervical segment dysfunction  Right supraspinatus tendinitis  Thoracic segment dysfunction.     Since last visit,   José Antonio Saha reports:    Area of chief complaint:  Cervical and Thoracic :  Symptoms are graded at 2/10. The quality is described as stiff, achey, dull.  Motion has increased, but is still not normal. The pt reports at least 90% improvement since initial presentation and is now able to use the microscope when working without pain in the R shoulder. He was able to run and perform other recreational activities with out aggravation. He denies weakness in the extremities. The pt is pleased with his progress.     Since last visit the patient feels that they are 90  percent  improved from last visit.       Objective:  The following was observed:    P: pain elicited on palpation, C7/T1, T8, T9  A: static palpation demonstrates intersegmental asymmetry   R: motion palpation notes restricted motion  T: hypertonicity at:Levator scapulae and T-spine paraspinal R>>L    Segmental spinal dysfunction/restrictions found at:  C7/T1, T8, T9      Assessment:    Diagnoses:      1. Right shoulder pain, unspecified chronicity    2. Cervical segment dysfunction    3. Right supraspinatus tendinitis    4. Thoracic segment dysfunction        Patient's condition:  Patient responding well to therapy    Treatment effectiveness:  Post manipulation there is better intersegmental movement and Patient claims to feel looser post manipulation      Procedures:  CMT:  32502 Chiropractic manipulative treatment 1-2 regions performed   Cervical: Diversified, C2, C7 , Prone, Supine  Thoracic: Diversified, , Prone    Modalities:  25455: US:  1.5 Castelan/cm squared for 8  minutes at 1 mhz   Location: R supraspinatus       Therapeutic procedures:   Therapeutic stretch to R R/C for 3 minutes   Gave  external shoulder rotation with demonstration   Gave biceps stretch with demonstration   Gave doorway pectoralis stretch in 3 different positions starting at 90 degree angles from body and raising arms 3 levels higher with demonstration.   Gave internal shoulder rotation with towel as per stretching protocol with demonstration.      Per 10 minutes total    Response to Treatment  Reduction in symptoms as reported by patient    Prognosis: Good    Progress towards Goals: Patient is making progress towards the goal.Goals:  SLEEPING: the patient specific goal is to attain his pre-injury status of 6-8 hours comfortably on either side  Moving the slide on the microscope        Recommendations:    Instructions:none     Follow-up:  Return to care in 2 week with US therapy

## 2018-06-04 ENCOUNTER — THERAPY VISIT (OUTPATIENT)
Dept: CHIROPRACTIC MEDICINE | Facility: CLINIC | Age: 68
End: 2018-06-04
Payer: COMMERCIAL

## 2018-06-04 DIAGNOSIS — M99.02 THORACIC SEGMENT DYSFUNCTION: ICD-10-CM

## 2018-06-04 DIAGNOSIS — M99.01 CERVICAL SEGMENT DYSFUNCTION: ICD-10-CM

## 2018-06-04 DIAGNOSIS — M25.511 RIGHT SHOULDER PAIN, UNSPECIFIED CHRONICITY: Primary | ICD-10-CM

## 2018-06-04 DIAGNOSIS — M75.91 RIGHT SUPRASPINATUS TENDINITIS: ICD-10-CM

## 2018-06-04 PROCEDURE — 97035 APP MDLTY 1+ULTRASOUND EA 15: CPT | Performed by: CHIROPRACTOR

## 2018-06-04 PROCEDURE — 98940 CHIROPRACT MANJ 1-2 REGIONS: CPT | Mod: AT | Performed by: CHIROPRACTOR

## 2018-06-04 NOTE — MR AVS SNAPSHOT
After Visit Summary   6/4/2018    DR José Antonio CHAO Nataon    MRN: [id number]           Patient Information     Date Of Birth          1950        Visit Information        Provider Department      6/4/2018 8:00 AM Elena Simon DC [facility]        Today's Diagnoses     Right shoulder pain, unspecified chronicity    -  1    Cervical segment dysfunction        Right supraspinatus tendinitis        Thoracic segment dysfunction           Follow-ups after your visit        Who to contact     If you have questions or need follow up information about today's clinic visit or your schedule please contact [facility] directly at [phone].  Normal or non-critical lab and imaging results will be communicated to you by MyChart, letter or phone within 4 business days after the clinic has received the results. If you do not hear from us within 7 days, please contact the clinic through [employer] or phone. If you have a critical or abnormal lab result, we will notify you by phone as soon as possible.  Submit refill requests through [employer] or call your pharmacy and they will forward the refill request to us. Please allow 3 business days for your refill to be completed.          Additional Information About Your Visit        MyChart Information     [employer] gives you secure access to your electronic health record. If you see a primary care provider, you can also send messages to your care team and make appointments. If you have questions, please call your primary care clinic.  If you do not have a primary care provider, please call [phone] and they will assist you.        Care EveryWhere ID     This is your Care EveryWhere ID. This could be used by other organizations to access your [facility] medical records  [id number]         Blood Pressure from Last 3 Encounters:   05/03/18 117/75   01/19/18 136/80   09/29/16 105/71    Weight from Last 3 Encounters:   05/03/18 77.6 kg (171 lb)   01/19/18 76.7  kg (169 lb)   07/29/16 76.2 kg (168 lb)              We Performed the Following     CHIROPRAC MANIP,SPINAL,1-2 REGIONS     ULTRASOUND THERAPY        Primary Care Provider Office Phone # Fax #    Kirk Chavez [phone] [address] [place] [address]        Equal Access to Services     [facility]: Hadii aad ku hadasho Soomaali, waaxda luqadaha, qaybta kaalmada adeegyada, waxay idiin hayaan adeeg kharash laStellaaan . So [facility] [phone].    ATENCIÓN: Si habla español, tiene a yuan disposición servicios gratuitos de asistencia lingüística. Llame al [phone].    We comply with applicable federal civil rights laws and [place] laws. We do not discriminate on the basis of race, color, national origin, age, disability, sex, sexual orientation, or gender identity.            Thank you!     Thank you for choosing [facility]  for your care. Our goal is always to provide you with excellent care. Hearing back from our patients is one way we can continue to improve our services. Please take a few minutes to complete the written survey that you may receive in the mail after your visit with us. Thank you!             Your Updated Medication List - Protect others around you: Learn how to safely use, store and throw away your medicines at www.disposemymeds.org.          This list is accurate as of 6/4/18  1:31 PM.  Always use your most recent med list.                   Brand Name Dispense Instructions for use Diagnosis    tamsulosin 0.4 MG capsule    FLOMAX    90 capsule    Take 1 capsule (0.4 mg) by mouth daily    Benign prostatic hyperplasia with nocturia
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
## 2018-07-30 ENCOUNTER — HOSPITAL ENCOUNTER (OUTPATIENT)
Dept: LAB | Facility: CLINIC | Age: 68
Discharge: HOME | End: 2018-07-30
Attending: PATHOLOGY | Admitting: PATHOLOGY
Payer: COMMERCIAL

## 2018-07-30 DIAGNOSIS — R97.20 ELEVATED PROSTATE SPECIFIC ANTIGEN (PSA): Primary | ICD-10-CM

## 2018-07-30 LAB
MISCELLANEOUS TEST: NORMAL
PSA SERPL-ACNC: 7.15 UG/L (ref 0–4)

## 2018-07-30 PROCEDURE — 36415 COLL VENOUS BLD VENIPUNCTURE: CPT | Performed by: PATHOLOGY

## 2018-07-30 PROCEDURE — 84999 UNLISTED CHEMISTRY PROCEDURE: CPT

## 2018-07-30 PROCEDURE — 84999 UNLISTED CHEMISTRY PROCEDURE: CPT | Performed by: PATHOLOGY

## 2018-07-30 PROCEDURE — 84154 ASSAY OF PSA FREE: CPT | Mod: XU

## 2018-07-30 PROCEDURE — 40000953: Performed by: PATHOLOGY

## 2018-07-30 PROCEDURE — 84153 ASSAY OF PSA TOTAL: CPT | Performed by: PATHOLOGY

## 2018-07-30 PROCEDURE — G0103 PSA SCREENING: HCPCS | Performed by: PATHOLOGY

## 2018-07-30 PROCEDURE — 86316 IMMUNOASSAY TUMOR OTHER: CPT

## 2018-07-31 LAB
RESULT: NORMAL
SEND OUTS MISC TEST CODE: NORMAL
SEND OUTS MISC TEST SPECIMEN: NORMAL
TEST NAME: NORMAL

## 2018-08-02 LAB
4K BIOPSY HISTORY: ABNORMAL
4K DIGITAL RECTAL EXAM: ABNORMAL
4K PSA FREE: 0.33 NG/ML
4K PSA PERCENT FREE: 5 %
4K PSA TOTAL: 6.92 NG/ML
4K SCORE: 71 %
Lab: ABNORMAL
Lab: YES

## 2018-08-20 ENCOUNTER — TRANSFERRED RECORDS (OUTPATIENT)
Dept: HEALTH INFORMATION MANAGEMENT | Facility: CLINIC | Age: 68
End: 2018-08-20

## 2018-08-27 ENCOUNTER — TRANSFERRED RECORDS (OUTPATIENT)
Dept: HEALTH INFORMATION MANAGEMENT | Facility: CLINIC | Age: 68
End: 2018-08-27

## 2019-06-20 ENCOUNTER — TRANSFERRED RECORDS (OUTPATIENT)
Dept: HEALTH INFORMATION MANAGEMENT | Facility: CLINIC | Age: 69
End: 2019-06-20

## 2019-07-25 ENCOUNTER — RECORDS - HEALTHEAST (OUTPATIENT)
Dept: LAB | Facility: CLINIC | Age: 69
End: 2019-07-25

## 2019-07-26 LAB
25(OH)D3 SERPL-MCNC: 30.5 NG/ML (ref 30–80)
ALBUMIN SERPL-MCNC: 2.9 G/DL (ref 3.5–5)
ALP SERPL-CCNC: 49 U/L (ref 45–120)
ALT SERPL W P-5'-P-CCNC: 14 U/L (ref 0–45)
ANION GAP SERPL CALCULATED.3IONS-SCNC: 5 MMOL/L (ref 5–18)
AST SERPL W P-5'-P-CCNC: 14 U/L (ref 0–40)
BILIRUB DIRECT SERPL-MCNC: 0.1 MG/DL
BILIRUB SERPL-MCNC: 0.2 MG/DL (ref 0–1)
BUN SERPL-MCNC: 18 MG/DL (ref 8–22)
CALCIUM SERPL-MCNC: 9.2 MG/DL (ref 8.5–10.5)
CHLORIDE BLD-SCNC: 106 MMOL/L (ref 98–107)
CO2 SERPL-SCNC: 29 MMOL/L (ref 22–31)
CREAT SERPL-MCNC: 0.63 MG/DL (ref 0.7–1.3)
ERYTHROCYTE [DISTWIDTH] IN BLOOD BY AUTOMATED COUNT: 13 % (ref 11–14.5)
GFR SERPL CREATININE-BSD FRML MDRD: >60 ML/MIN/1.73M2
GLUCOSE BLD-MCNC: 96 MG/DL (ref 70–125)
HCT VFR BLD AUTO: 33.7 % (ref 40–54)
HGB BLD-MCNC: 11.2 G/DL (ref 14–18)
MCH RBC QN AUTO: 32.4 PG (ref 27–34)
MCHC RBC AUTO-ENTMCNC: 33.2 G/DL (ref 32–36)
MCV RBC AUTO: 97 FL (ref 80–100)
PLATELET # BLD AUTO: 302 THOU/UL (ref 140–440)
PMV BLD AUTO: 9.5 FL (ref 8.5–12.5)
POTASSIUM BLD-SCNC: 3.8 MMOL/L (ref 3.5–5)
PROT SERPL-MCNC: 6 G/DL (ref 6–8)
RBC # BLD AUTO: 3.46 MILL/UL (ref 4.4–6.2)
SODIUM SERPL-SCNC: 140 MMOL/L (ref 136–145)
TSH SERPL DL<=0.005 MIU/L-ACNC: 7.68 UIU/ML (ref 0.3–5)
WBC: 6.8 THOU/UL (ref 4–11)

## 2019-07-29 ENCOUNTER — RECORDS - HEALTHEAST (OUTPATIENT)
Dept: LAB | Facility: CLINIC | Age: 69
End: 2019-07-29

## 2019-07-29 LAB
T3 SERPL-MCNC: 103 NG/DL (ref 45–175)
T3FREE SERPL-MCNC: 3 PG/ML (ref 1.9–3.9)
T4 FREE SERPL-MCNC: 0.8 NG/DL (ref 0.7–1.8)
TSH SERPL DL<=0.005 MIU/L-ACNC: 6.01 UIU/ML (ref 0.3–5)

## 2019-09-12 ENCOUNTER — RECORDS - HEALTHEAST (OUTPATIENT)
Dept: LAB | Facility: CLINIC | Age: 69
End: 2019-09-12

## 2019-09-13 LAB
ALBUMIN SERPL-MCNC: 3.3 G/DL (ref 3.5–5)
ALP SERPL-CCNC: 41 U/L (ref 45–120)
ALT SERPL W P-5'-P-CCNC: 18 U/L (ref 0–45)
AST SERPL W P-5'-P-CCNC: 18 U/L (ref 0–40)
BILIRUB DIRECT SERPL-MCNC: 0.1 MG/DL
BILIRUB SERPL-MCNC: 0.3 MG/DL (ref 0–1)
PROT SERPL-MCNC: 6.1 G/DL (ref 6–8)

## 2019-10-15 ENCOUNTER — TRANSFERRED RECORDS (OUTPATIENT)
Dept: HEALTH INFORMATION MANAGEMENT | Facility: CLINIC | Age: 69
End: 2019-10-15

## 2019-11-20 ENCOUNTER — OFFICE VISIT (OUTPATIENT)
Dept: FAMILY MEDICINE | Facility: CLINIC | Age: 69
End: 2019-11-20
Payer: COMMERCIAL

## 2019-11-20 VITALS
DIASTOLIC BLOOD PRESSURE: 64 MMHG | HEART RATE: 67 BPM | OXYGEN SATURATION: 97 % | TEMPERATURE: 97.3 F | SYSTOLIC BLOOD PRESSURE: 96 MMHG

## 2019-11-20 DIAGNOSIS — Z87.828 H/O SPINAL CORD INJURY: Primary | ICD-10-CM

## 2019-11-20 PROCEDURE — 99213 OFFICE O/P EST LOW 20 MIN: CPT | Performed by: INTERNAL MEDICINE

## 2019-11-20 RX ORDER — TOLTERODINE TARTRATE 2 MG/1
2 TABLET, EXTENDED RELEASE ORAL 2 TIMES DAILY
COMMUNITY
End: 2019-11-20

## 2019-11-20 RX ORDER — GABAPENTIN 400 MG/1
400 CAPSULE ORAL 3 TIMES DAILY
Qty: 270 CAPSULE | Refills: 3 | Status: SHIPPED | OUTPATIENT
Start: 2019-11-20 | End: 2021-04-19

## 2019-11-20 RX ORDER — BACLOFEN 20 MG/1
20 TABLET ORAL 4 TIMES DAILY
COMMUNITY
End: 2019-11-20

## 2019-11-20 RX ORDER — FAMOTIDINE 20 MG
TABLET ORAL DAILY
COMMUNITY

## 2019-11-20 RX ORDER — SENNOSIDES A AND B 8.6 MG/1
1 TABLET, FILM COATED ORAL 3 TIMES DAILY
COMMUNITY
End: 2021-04-19

## 2019-11-20 RX ORDER — SODIUM CHLORIDE 5 %
1 OINTMENT (GRAM) OPHTHALMIC (EYE) PRN
COMMUNITY

## 2019-11-20 RX ORDER — BACLOFEN 20 MG/1
20 TABLET ORAL 4 TIMES DAILY
Qty: 360 TABLET | Refills: 3 | Status: SHIPPED | OUTPATIENT
Start: 2019-11-20 | End: 2020-12-09

## 2019-11-20 RX ORDER — TOLTERODINE TARTRATE 2 MG/1
2 TABLET, EXTENDED RELEASE ORAL 2 TIMES DAILY
Qty: 180 TABLET | Refills: 3 | Status: SHIPPED | OUTPATIENT
Start: 2019-11-20 | End: 2020-08-18

## 2019-11-20 RX ORDER — GABAPENTIN 400 MG/1
400 CAPSULE ORAL 3 TIMES DAILY
COMMUNITY
End: 2019-11-20
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
## 2019-12-02 ENCOUNTER — TRANSFERRED RECORDS (OUTPATIENT)
Dept: HEALTH INFORMATION MANAGEMENT | Facility: CLINIC | Age: 69
End: 2019-12-02

## 2019-12-09 ENCOUNTER — HEALTH MAINTENANCE LETTER (OUTPATIENT)
Age: 69
End: 2019-12-09

## 2019-12-16 ENCOUNTER — OFFICE VISIT (OUTPATIENT)
Dept: DERMATOLOGY | Facility: CLINIC | Age: 69
End: 2019-12-16
Payer: COMMERCIAL

## 2019-12-16 VITALS — OXYGEN SATURATION: 97 % | SYSTOLIC BLOOD PRESSURE: 110 MMHG | HEART RATE: 78 BPM | DIASTOLIC BLOOD PRESSURE: 57 MMHG

## 2019-12-16 DIAGNOSIS — Z85.820 HISTORY OF MELANOMA: ICD-10-CM

## 2019-12-16 DIAGNOSIS — Z85.828 HISTORY OF NONMELANOMA SKIN CANCER: ICD-10-CM

## 2019-12-16 DIAGNOSIS — D18.01 CHERRY ANGIOMA: ICD-10-CM

## 2019-12-16 DIAGNOSIS — L82.1 SEBORRHEIC KERATOSES: ICD-10-CM

## 2019-12-16 DIAGNOSIS — D22.9 MULTIPLE BENIGN NEVI: ICD-10-CM

## 2019-12-16 DIAGNOSIS — D48.5 NEOPLASM OF UNCERTAIN BEHAVIOR OF SKIN: Primary | ICD-10-CM

## 2019-12-16 DIAGNOSIS — L57.8 SOLAR ELASTOSIS: ICD-10-CM

## 2019-12-16 DIAGNOSIS — Q27.9 CAPILLARY MALFORMATION: ICD-10-CM

## 2019-12-16 PROCEDURE — 88305 TISSUE EXAM BY PATHOLOGIST: CPT | Mod: TC | Performed by: PHYSICIAN ASSISTANT

## 2019-12-16 PROCEDURE — 11102 TANGNTL BX SKIN SINGLE LES: CPT | Performed by: PHYSICIAN ASSISTANT

## 2019-12-16 PROCEDURE — 99214 OFFICE O/P EST MOD 30 MIN: CPT | Mod: 25 | Performed by: PHYSICIAN ASSISTANT

## 2019-12-16 NOTE — PATIENT INSTRUCTIONS
Wound Care Instructions     FOR SUPERFICIAL WOUNDS     [facility] [phone]    [facility] [phone]          AFTER 24 HOURS YOU SHOULD REMOVE THE BANDAGE AND BEGIN DAILY DRESSING CHANGES AS FOLLOWS:     1) Remove Dressing.     2) Clean and dry the area with tap water using a Q-tip or sterile gauze pad.     3) Apply Vaseline, Aquaphor, Polysporin ointment or Bacitracin ointment over entire wound.  Do NOT use Neosporin ointment.     4) Cover the wound with a band-aid, or a sterile non-stick gauze pad and micropore paper tape      REPEAT THESE INSTRUCTIONS AT LEAST ONCE A DAY UNTIL THE WOUND HAS COMPLETELY HEALED.    It is an old wives tale that a wound heals better when it is exposed to air and allowed to dry out. The wound will heal faster with a better cosmetic result if it is kept moist with ointment and covered with a bandage.    **Do not let the wound dry out.**      Supplies Needed:      *Cotton tipped applicators (Q-tips)    *Polysporin Ointment or Bacitracin Ointment (NOT NEOSPORIN)    *Band-aids or non-stick gauze pads and micropore paper tape.      PATIENT INFORMATION:    During the healing process you will notice a number of changes. All wounds develop a small halo of redness surrounding the wound.  This means healing is occurring. Severe itching with extensive redness usually indicates sensitivity to the ointment or bandage tape used to dress the wound.  You should call our office if this develops.      Swelling  and/or discoloration around your surgical site is common, particularly when performed around the eye.    All wounds normally drain.  The larger the wound the more drainage there will be.  After 7-10 days, you will notice the wound beginning to shrink and new skin will begin to grow.  The wound is healed when you can see skin has formed over the entire area.  A healed wound has a healthy, shiny look to the surface and is red to dark pink in color to  normalize.  Wounds may take approximately 4-6 weeks to heal.  Larger wounds may take 6-8 weeks.  After the wound is healed you may discontinue dressing changes.    You may experience a sensation of tightness as your wound heals. This is normal and will gradually subside.    Your healed wound may be sensitive to temperature changes. This sensitivity improves with time, but if you re having a lot of discomfort, try to avoid temperature extremes.    Patients frequently experience itching after their wound appears to have healed because of the continue healing under the skin.  Plain Vaseline will help relieve the itching.        POSSIBLE COMPLICATIONS    BLEEDIN. Leave the bandage in place.  2. Use tightly rolled up gauze or a cloth to apply direct pressure over the bandage for 30  minutes.  3. Reapply pressure for an additional 30 minutes if necessary  4. Use additional gauze and tape to maintain pressure once the bleeding has stopped.    WOUND CARE INSTRUCTIONS  FOR CRYOSURGERY        This area treated with liquid nitrogen will form a blister. You do not need to bandage the area until after the blister forms and breaks (which may be a few days).  When the blister breaks, begin daily dressing changes as follows:    1) Clean and dry the area with tap water using clean Q-tip or sterile gauze pad.    2) Apply Polysporin ointment or Bacitracin ointment over entire wound.  Do NOT use Neosporin ointment.    3) Cover the wound with a band-aid or sterile non-stick gauze pad and micropore paper tape.      REPEAT THESE INSTRUCTIONS AT LEAST ONCE A DAY UNTIL THE WOUND HAS COMPLETELY HEALED.        It is an old wives tale that a wound heals better when it is exposed to air and allowed to dry out. The wound will heal faster with a better cosmetic result if it is kept moist with ointment and covered with a bandage.  Do not let the wound dry out.      Supplies Needed:     *Cotton tipped applicators (Q-tips)   *Polysporin  ointment or Bacitracin ointment (NOT NEOSPORIN)   *Band-aids, or non stick gauze pads and micropore paper tape    PATIENT INFORMATION    During the healing process you will notice a number of changes. All wounds develop a small halo of redness surrounding the wound.  This means healing is occurring. Severe itching with extensive redness usually indicates sensitivity to the ointment or bandage tape used to dress the wound.  You should call our office if this develops.      Swelling and/or discoloration around your surgical site is common, particularly when performed around the eye.    All wounds normally drain.  The larger the wound the more drainage there will be.  After 7-10 days, you will notice the wound beginning to shrink and new skin will begin to grow.  The wound is healed when you can see skin has formed over the entire area.  A healed wound has a healthy, shiny look to the surface and is red to dark pink in color to normalize.  Wounds may take approximately 4-6 weeks to heal.  Larger wounds may take 6-8 weeks.  After the wound is healed you may discontinue dressing changes.    You may experience a sensation of tightness as your wound heals. This is normal and will gradually subside.    Your healed wound may be sensitive to temperature changes. This sensitivity improves with time, but if you re having a lot of discomfort, try to avoid temperature extremes.    Patients frequently experience itching after their wound appears to have healed because of the continue healing under the skin.  Plain Vaseline will help relieve the itching.               Proper skin care from [facility]:    -Eliminate harsh soaps as they strip the natural oils from the skin, often resulting in dry itchy skin ( i.e. Dial, Zest, Adrienne Spring)  -Use mild soaps such as Cetaphil or Dove Sensitive Skin in the shower. You do not need to use soap on arms, legs, and trunk every time you shower unless visibly soiled.   -Avoid hot or  cold showers.  -After showering, lightly dry off and apply moisturizing within 2-3 minutes. This will help trap moisture in the skin.   -Aggressive use of a moisturizer at least 1-2 times a day to the entire body (including -Vanicream, Cetaphil, Aquaphor or Cerave) and moisturize hands after every washing.  -We recommend using moisturizers that come in a tub that needs to be scooped out, not a pump. This has more of an oil base. It will hold moisture in your skin much better than a water base moisturizer. The above recommended are non-pore clogging.      Wear a sunscreen with at least SPF 30 on your face, ears, neck and V of the chest daily. Wear sunscreen on other areas of the body if those areas are exposed to the sun throughout the day. Sunscreens can contain physical and/or chemical blockers. Physical blockers are less likely to clog pores, these include zinc oxide and titanium dioxide. Reapply every two hour and after swimming. Sunscreen examples include Neutrogena, CeraVe, Blue Lizard, Elta MD and many others.    UV radiation  UVA radiation remains constant throughout the day and throughout the year. It is a longer wavelength than UVB and therefore penetrates deeper into the skin leading to immediate and delayed tanning, photoaging, and skin cancer. 70-80% of UVA and UVB radiation occurs between the hours of 10am-2pm.  UVB radiation  UVB radiation causes the most harmful effects and is more significant during the summer months. However, snow and ice can reflect UVB radiation leading to skin damage during the winter months as well. UVB radiation is responsible for tanning, burning, inflammation, delayed erythema (pinkness), pigmentation (brown spots), and skin cancer.

## 2019-12-16 NOTE — LETTER
12/16/2019         RE: José Antonio Saha  [address]  [place] [facility] [place] [address]        Dear Colleague,    Thank you for referring your patient, José Antonio Saha, to the [facility]. Please see a copy of my visit note below.    HPI:  José Antonio Saha is a 69 year old male patient here today for spot on back .  Patient states this has been present for unknown.  Patient reports the following symptoms: none .  Patient reports the following previous treatments: none.  Patient reports the following modifying factors: none.  Associated symptoms: none.  Also has a spot on left shin for years. Not bothersome. No tx tried. Patient has no other skin complaints today.  Remainder of the HPI, Meds, PMH, Allergies, FH, and SH was reviewed in chart.    Pertinent Hx:   BCC, MM  Past Medical History:   Diagnosis Date     Basal cell carcinoma      Malignant melanoma of other specified sites of skin 1980s    no recurrence     Personal history of other malignant neoplasm of skin 4/15/02    recurrent BCC scalp and face     Routine general medical examination at a health care facility 4/12/02     Scrotal varices     L scrotum       Past Surgical History:   Procedure Laterality Date     C NONSPECIFIC PROCEDURE  6/27/02    colonoscopy with polypectomy x3, fulguration x1     C NONSPECIFIC PROCEDURE      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     C NONSPECIFIC PROCEDURE      multiple skin Bxs     C NONSPECIFIC PROCEDURE      tonsillectomy     COLONOSCOPY  11/19/2013    Procedure: COMBINED COLONOSCOPY, SINGLE BIOPSY/POLYPECTOMY BY BIOPSY;  COLONOSCOPY;  Surgeon: Adam Lopez MD;  Location:  GI     COLONOSCOPY N/A 9/29/2016    Procedure: COLONOSCOPY;  Surgeon: Paul Ken MD;  Location:  GI        Family History   Problem Relation Age of Onset     Cancer Mother         thyroid and lung     Prostate Cancer Father 65        uncle also had prostate Ca     Cerebrovascular Disease Father       Please contact patient regarding recent lab results:    - Your hemoglobin remains low, which means you are anemic (not enough red blood cells). Your ferritin indicates low iron levels; iron is needed to create new red blood cells. I would like you to start an iron supplement; take 1 tablet every OTHER day for the next 2-3 months. Iron is absorbed better when taken with citrus (ex. Glass of orange juice). Iron supplements may also cause constipation, it is ok to use a stool softener or suppository as needed. Iron will also result in discolored stools (dark brown or black), which is a benign side effect and not cause for concern. Please return to clinic in 3 months (Jan 2019) to recheck your blood work.    - Your thyroid level was normal, indicating you on the correct dose of thyroid medication.   Hypertension Father      Arthritis Daughter      Skin Cancer Daughter      Breast Cancer Sister      Cancer - colorectal No family hx of      Diabetes No family hx of        Social History     Socioeconomic History     Marital status:      Spouse name: Not on file     Number of children: 3     Years of education: Not on file     Highest education level: Not on file   Occupational History     Employer: [employer]   Social Needs     Financial resource strain: Not on file     Food insecurity:     Worry: Not on file     Inability: Not on file     Transportation needs:     Medical: Not on file     Non-medical: Not on file   Tobacco Use     Smoking status: Never Smoker     Smokeless tobacco: Never Used   Substance and Sexual Activity     Alcohol use: Yes     Alcohol/week: 0.0 standard drinks     Comment: 2  drinks per week     Drug use: Not on file     Sexual activity: Not on file   Lifestyle     Physical activity:     Days per week: Not on file     Minutes per session: Not on file     Stress: Not on file   Relationships     Social connections:     Talks on phone: Not on file     Gets together: Not on file     Attends Cheondoism service: Not on file     Active member of club or organization: Not on file     Attends meetings of clubs or organizations: Not on file     Relationship status: Not on file     Intimate partner violence:     Fear of current or ex partner: Not on file     Emotionally abused: Not on file     Physically abused: Not on file     Forced sexual activity: Not on file   Other Topics Concern     Not on file   Social History Narrative     Not on file       Outpatient Encounter Medications as of 12/16/2019   Medication Sig Dispense Refill     Acetaminophen (TYLENOL) 325 MG CAPS Take 325-650 mg by mouth as needed       baclofen (LIORESAL) 20 MG tablet Take 1 tablet (20 mg) by mouth 4 times daily 360 tablet 3     docusate sodium (ENEMEEZ) 283 MG enema Place 1 enema rectally daily 90 enema  3     gabapentin (NEURONTIN) 400 MG capsule Take 1 capsule (400 mg) by mouth 3 times daily 270 capsule 3     saline 0.65 % (Soln) SOLN Spray in nostril as needed       senna (SENOKOT) 8.6 MG tablet Take 1 tablet by mouth daily       sodium chloride (SHANNA 128) 5 % ophthalmic ointment 1 Application as needed for dry eyes       tiZANidine (ZANAFLEX) 4 MG tablet Take 1 tablet (4 mg) by mouth 3 times daily 270 tablet 3     tolterodine (DETROL) 2 MG tablet Take 1 tablet (2 mg) by mouth 2 times daily 180 tablet 3     UNABLE TO FIND MEDICATION NAME: Guar Gum Powder       Vitamin D, Cholecalciferol, 25 MCG (1000 UT) CAPS        No facility-administered encounter medications on file as of 12/16/2019.        Review Of Systems:  Skin: spot on back  Eyes: negative  Ears/Nose/Throat: negative  Respiratory: No shortness of breath, dyspnea on exertion, cough, or hemoptysis  Cardiovascular: negative  Gastrointestinal: negative  Genitourinary: negative  Musculoskeletal: negative  Neurologic: negative  Psychiatric: negative  Hematologic/Lymphatic/Immunologic: negative  Endocrine: negative      Objective:     /57   Pulse 78   SpO2 97%   Eyes: Conjunctivae/lids: Normal   ENT: Lips:  Normal  MSK: Normal  Cardiovascular: Peripheral edema none  Pulm: Breathing Normal  Neuro/Psych: Orientation: A/O x 3. Normal; Mood/Affect: Normal, NAD, WDWN  Pt accompanied by: wife Steffanie  Following areas examined: Scalp, face, eyelids, lips, neck, chest, abdomen, back, ventral legs. pt in wheel chair.  Urban skin type:i   Lymph nodes palpated: submandibular, submental, cervical, left axillary and left antecubital  Findings:  Red smooth well-defined macules on trunk and extremities.  Brown, stuck-on scaly appearing papules on trunk and extremities.  Well circumscribed macules with symmetric color distribution on trunk and extremities.  Tan WD smooth macules on face, neck, trunk, and extremities.  Smooth linear patch on right upper back, on left  calf, right forehead, left dorsal forearm, left ala, right abdomen,  right nasal tip    Assessment and Plan:     1) Cherry angiomas, Seborrheic keratoses, Benign nevi, Lentigines     I discussed the specifics of tumor, prognosis, and genetics of benign lesions.  I explained that treatment of these lesions would be purely cosmetic and not medically neccessary.  I discussed with patient different removal options including excision, cryotherapy, cautery and /or laser.  Lesion may recur and/or may not completely resolve. May need additional treatment.     2) Neoplasm of uncertain behavior upper back 0.4cm  Pt high risk for skin cancer  sk vs MM  TANGENTIAL BIOPSY:  After consent, anesthesia with LEC and prep, tangential biopsy performed.  No complications and routine wound care.  May grow back and will get a scar. Based on lesion type may need to completely remove lesion. Patient will be notified in 7-10 days of results. Wound care directions given.    3) history of NMSC and MM  Reviewed pts surgical charts  BCC on left calf tx by [facility] 9/8/16  BCC on right forehead tx by [facility] 9/8/16  BCC on left dorsal forearm tx by [facility] 9/8/16  BCC on left ala tx by [facility] 9/26/2013  BCC on right abdomen excision 11/21/13  BCC right nasal tip tx by [facility] 9/26/13  MM on right upper back/shoulder -tx in the 1980s  No evidence of recurrence  Signs and Symptoms of non-melanoma skin cancer and ABCDEs of melanoma reviewed with patient. Patient encouraged to perform monthly self skin exams and educated on how to perform them. UV precautions reviewed with patient. Patient was asked about new or changing moles/lesions on body.   Wear a sunscreen with at least SPF 30 on your face, ears, neck and V of the chest daily. Wear sunscreen on other areas of the body if those areas are exposed to the sun throughout the day. Sunscreens can contain physical and/or chemical blockers. Physical blockers are less likely to clog pores, these include zinc oxide  and titanium dioxide. Reapply every two hour and after swimming. Sunscreen examples include Neutrogena, CeraVe, Blue Lizard, Elta MD and many others.    Proper skin care from [facility]:    -Eliminate harsh soaps as they strip the natural oils from the skin, often resulting in dry itchy skin ( i.e. Dial, Zest, [place] Spring)  -Use mild soaps such as Cetaphil or Dove Sensitive Skin in the shower. You do not need to use soap on arms, legs, and trunk every time you shower unless visibly soiled.   -Avoid hot or cold showers.  -After showering, lightly dry off and apply moisturizing within 2-3 minutes. This will help trap moisture in the skin.   -Aggressive use of a moisturizer at least 1-2 times a day to the entire body (including -Vanicream, Cetaphil, Aquaphor or Cerave) and moisturize hands after every washing.  -We recommend using moisturizers that come in a tub that needs to be scooped out, not a pump. This has more of an oil base. It will hold moisture in your skin much better than a water base moisturizer. The above recommended are non-pore clogging.               Follow up in yearly FBE      Again, thank you for allowing me to participate in the care of your patient.        Sincerely,        Hannah Douglas PA-C

## 2019-12-16 NOTE — PROGRESS NOTES
HPI:  José Antonio Saha is a 69 year old male patient here today for spot on back .  Patient states this has been present for unknown.  Patient reports the following symptoms: none .  Patient reports the following previous treatments: none.  Patient reports the following modifying factors: none.  Associated symptoms: none.  Also has a spot on left shin for years. Not bothersome. No tx tried. Patient has no other skin complaints today.  Remainder of the HPI, Meds, PMH, Allergies, FH, and SH was reviewed in chart.    Pertinent Hx:   BCC, MM  Past Medical History:   Diagnosis Date     Basal cell carcinoma      Malignant melanoma of other specified sites of skin 1980s    no recurrence     Personal history of other malignant neoplasm of skin 4/15/02    recurrent BCC scalp and face     Routine general medical examination at a [facility] care facility 4/12/02     Scrotal varices     L scrotum       Past Surgical History:   Procedure Laterality Date     C NONSPECIFIC PROCEDURE  6/27/02    colonoscopy with polypectomy x3, fulguration x1     C NONSPECIFIC PROCEDURE      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     C NONSPECIFIC PROCEDURE      multiple skin Bxs     C NONSPECIFIC PROCEDURE      tonsillectomy     COLONOSCOPY  11/19/2013    Procedure: COMBINED COLONOSCOPY, SINGLE BIOPSY/POLYPECTOMY BY BIOPSY;  COLONOSCOPY;  Surgeon: Adam Lopez MD;  Location:  GI     COLONOSCOPY N/A 9/29/2016    Procedure: COLONOSCOPY;  Surgeon: Paul Ken MD;  Location:  GI        Family History   Problem Relation Age of Onset     Cancer Mother         thyroid and lung     Prostate Cancer Father 65        uncle also had prostate Ca     Cerebrovascular Disease Father      Hypertension Father      Arthritis Daughter      Skin Cancer Daughter      Breast Cancer Sister      Cancer - colorectal No family hx of      Diabetes No family hx of        Social History     Socioeconomic History     Marital status:      Spouse name: Not on  file     Number of children: 3     Years of education: Not on file     Highest education level: Not on file   Occupational History     Employer: [employer]   Social Needs     Financial resource strain: Not on file     Food insecurity:     Worry: Not on file     Inability: Not on file     Transportation needs:     Medical: Not on file     Non-medical: Not on file   Tobacco Use     Smoking status: Never Smoker     Smokeless tobacco: Never Used   Substance and Sexual Activity     Alcohol use: Yes     Alcohol/week: 0.0 standard drinks     Comment: 2  drinks per week     Drug use: Not on file     Sexual activity: Not on file   Lifestyle     Physical activity:     Days per week: Not on file     Minutes per session: Not on file     Stress: Not on file   Relationships     Social connections:     Talks on phone: Not on file     Gets together: Not on file     Attends Church service: Not on file     Active member of club or organization: Not on file     Attends meetings of clubs or organizations: Not on file     Relationship status: Not on file     Intimate partner violence:     Fear of current or ex partner: Not on file     Emotionally abused: Not on file     Physically abused: Not on file     Forced sexual activity: Not on file   Other Topics Concern     Not on file   Social History Narrative     Not on file       Outpatient Encounter Medications as of 12/16/2019   Medication Sig Dispense Refill     Acetaminophen (TYLENOL) 325 MG CAPS Take 325-650 mg by mouth as needed       baclofen (LIORESAL) 20 MG tablet Take 1 tablet (20 mg) by mouth 4 times daily 360 tablet 3     docusate sodium (ENEMEEZ) 283 MG enema Place 1 enema rectally daily 90 enema 3     gabapentin (NEURONTIN) 400 MG capsule Take 1 capsule (400 mg) by mouth 3 times daily 270 capsule 3     saline 0.65 % (Soln) SOLN Spray in nostril as needed       senna (SENOKOT) 8.6 MG tablet Take 1 tablet by mouth daily       sodium chloride (SHANNA 128) 5 %  ophthalmic ointment 1 Application as needed for dry eyes       tiZANidine (ZANAFLEX) 4 MG tablet Take 1 tablet (4 mg) by mouth 3 times daily 270 tablet 3     tolterodine (DETROL) 2 MG tablet Take 1 tablet (2 mg) by mouth 2 times daily 180 tablet 3     UNABLE TO FIND MEDICATION NAME: Guar Gum Powder       Vitamin D, Cholecalciferol, 25 MCG (1000 UT) CAPS        No facility-administered encounter medications on file as of 12/16/2019.        Review Of Systems:  Skin: spot on back  Eyes: negative  Ears/Nose/Throat: negative  Respiratory: No shortness of breath, dyspnea on exertion, cough, or hemoptysis  Cardiovascular: negative  Gastrointestinal: negative  Genitourinary: negative  Musculoskeletal: negative  Neurologic: negative  Psychiatric: negative  Hematologic/Lymphatic/Immunologic: negative  Endocrine: negative      Objective:     /57   Pulse 78   SpO2 97%   Eyes: Conjunctivae/lids: Normal   ENT: Lips:  Normal  MSK: Normal  Cardiovascular: Peripheral edema none  Pulm: Breathing Normal  Neuro/Psych: Orientation: A/O x 3. Normal; Mood/Affect: Normal, NAD, WDWN  Pt accompanied by: wife Steffanie  Following areas examined: Scalp, face, eyelids, lips, neck, chest, abdomen, back, ventral legs. pt in wheel chair.  Urban skin type:i   Lymph nodes palpated: submandibular, submental, cervical, left axillary and left antecubital  Findings:  Red smooth well-defined macules on trunk and extremities.  Brown, stuck-on scaly appearing papules on trunk and extremities.  Well circumscribed macules with symmetric color distribution on trunk and extremities.  Tan WD smooth macules on face, neck, trunk, and extremities.  Smooth linear patch on right upper back, on left calf, right forehead, left dorsal forearm, left ala, right abdomen,  right nasal tip  Red smooth blanchable patch on left ventral leg  Brown irregularly pigmented macule on upper back 0.4cm    Assessment and Plan:     1) Cherry angiomas, Seborrheic keratoses,  Benign nevi, Lentigines, capillary malformation    I discussed the specifics of tumor, prognosis, and genetics of benign lesions.  I explained that treatment of these lesions would be purely cosmetic and not medically neccessary.  I discussed with patient different removal options including excision, cryotherapy, cautery and /or laser.  Lesion may recur and/or may not completely resolve. May need additional treatment.     2) Neoplasm of uncertain behavior upper back 0.4cm  Pt high risk for skin cancer  sk vs MM  TANGENTIAL BIOPSY:  After consent, anesthesia with LEC and prep, tangential biopsy performed.  No complications and routine wound care.  May grow back and will get a scar. Based on lesion type may need to completely remove lesion. Patient will be notified in 7-10 days of results. Wound care directions given.    3) history of NMSC and MM  Reviewed pts surgical charts  BCC on left calf tx by m[facility]s 9/8/16  BCC on right forehead tx by m[facility] 9/8/16  BCC on left dorsal forearm tx by mohs 9/8/16  BCC on left ala tx by mohs 9/26/2013  BCC on right abdomen excision 11/21/13  BCC right nasal tip tx by [facility] 9/26/13  MM on right upper back/shoulder -tx in the 1980s  No evidence of recurrence  Signs and Symptoms of non-melanoma skin cancer and ABCDEs of melanoma reviewed with patient. Patient encouraged to perform monthly self skin exams and educated on how to perform them. UV precautions reviewed with patient. Patient was asked about new or changing moles/lesions on body.   Wear a sunscreen with at least SPF 30 on your face, ears, neck and V of the chest daily. Wear sunscreen on other areas of the body if those areas are exposed to the sun throughout the day. Sunscreens can contain physical and/or chemical blockers. Physical blockers are less likely to clog pores, these include zinc oxide and titanium dioxide. Reapply every two hour and after swimming. Sunscreen examples include Neutrogena, CeraVe, Blue Lizard, Elta MD  and many others.    Proper skin care from [facility]:    -Eliminate harsh soaps as they strip the natural oils from the skin, often resulting in dry itchy skin ( i.e. Dial, Zest, Adrienne Spring)  -Use mild soaps such as Cetaphil or Dove Sensitive Skin in the shower. You do not need to use soap on arms, legs, and trunk every time you shower unless visibly soiled.   -Avoid hot or cold showers.  -After showering, lightly dry off and apply moisturizing within 2-3 minutes. This will help trap moisture in the skin.   -Aggressive use of a moisturizer at least 1-2 times a day to the entire body (including -Vanicream, Cetaphil, Aquaphor or Cerave) and moisturize hands after every washing.  -We recommend using moisturizers that come in a tub that needs to be scooped out, not a pump. This has more of an oil base. It will hold moisture in your skin much better than a water base moisturizer. The above recommended are non-pore clogging.               Follow up in yearly FBE

## 2019-12-19 ENCOUNTER — OFFICE VISIT (OUTPATIENT)
Dept: FAMILY MEDICINE | Facility: CLINIC | Age: 69
End: 2019-12-19
Payer: COMMERCIAL

## 2019-12-19 VITALS
OXYGEN SATURATION: 98 % | DIASTOLIC BLOOD PRESSURE: 56 MMHG | HEART RATE: 71 BPM | TEMPERATURE: 98.7 F | SYSTOLIC BLOOD PRESSURE: 89 MMHG

## 2019-12-19 DIAGNOSIS — N31.9 NEUROGENIC BLADDER: ICD-10-CM

## 2019-12-19 DIAGNOSIS — M99.01 CERVICAL SEGMENT DYSFUNCTION: ICD-10-CM

## 2019-12-19 DIAGNOSIS — Z01.818 PREOP GENERAL PHYSICAL EXAM: Primary | ICD-10-CM

## 2019-12-19 PROCEDURE — 99214 OFFICE O/P EST MOD 30 MIN: CPT | Performed by: INTERNAL MEDICINE
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
## 2019-12-23 ENCOUNTER — TELEPHONE (OUTPATIENT)
Dept: FAMILY MEDICINE | Facility: CLINIC | Age: 69
End: 2019-12-23

## 2019-12-23 LAB — COPATH REPORT: NORMAL

## 2019-12-23 NOTE — TELEPHONE ENCOUNTER
Called patient- educated on atypical moles   (An atypical nevus is benign (non cancerous) but may have the potential to evolve into a melanoma skin cancer over time. There are degrees of atypia called mild, moderate and severe . A mild atypical nevus does not need a re-excision, but if pigmentation should recur then it should be evaluated. The more severe atypical changes that are present, the more concern there is regarding its potential to evolve into a melanoma.       patient states that he will take this under consideration - he has many other health concerns at the moment and is not willing to schedule at this time  patient want the information mailed to his home with the path report    patient would like to know the percentage of atypical moles that do turn into melanoma?      Edna CHANG RN BSN  [facility]  [phone]

## 2019-12-23 NOTE — TELEPHONE ENCOUNTER
----- Message from Hannah Douglas PA-C sent at 12/23/2019  1:10 PM CST -----  Shave, upper back:   - Compound dysplastic nevus with moderate atypia   Per dermpath recommend re-excision  please schedule with Dr. Holcomb

## 2019-12-23 NOTE — LETTER
December 23, 2019    José Antonio Saha  [address]  [place] [address]        Dear José Antonio,    This is a letter to summarize our phone call on December 23, 2019. I explained that the biopsy was consistent with a moderately atypical nevus. The following recommendations were made:     Schedule an appointment for reexcision at the office with Dr. Holcomb    Thank you for allowing me to be involved in your health care and for choosing [facility].  If you have any questions or concerns please feel free to contact me at [phone].      Sincerely,    Hannah Douglas PA-C/  Edna CHANGRN BSN  [facility]  [phone]

## 2019-12-24 NOTE — TELEPHONE ENCOUNTER
Patient notified of test results and providers message, patient has no further questions.    Edna CHANGRN BSN  [facility]  [phone]

## 2019-12-24 NOTE — TELEPHONE ENCOUNTER
Due to the surrounding tissue having sun damage the dermatopatholgist recommends that the area be removed further.     There is no data on risk/percentage of atypical nevi developing into a melanoma.    Most melanomas do not arise from within a mole.

## 2020-01-14 ENCOUNTER — TELEPHONE (OUTPATIENT)
Dept: FAMILY MEDICINE | Facility: CLINIC | Age: 70
End: 2020-01-14

## 2020-01-14 ENCOUNTER — OFFICE VISIT (OUTPATIENT)
Dept: URGENT CARE | Facility: URGENT CARE | Age: 70
End: 2020-01-14
Payer: COMMERCIAL

## 2020-01-14 VITALS
SYSTOLIC BLOOD PRESSURE: 104 MMHG | HEART RATE: 78 BPM | OXYGEN SATURATION: 97 % | DIASTOLIC BLOOD PRESSURE: 67 MMHG | RESPIRATION RATE: 12 BRPM | TEMPERATURE: 99.1 F

## 2020-01-14 DIAGNOSIS — R30.0 DYSURIA: Primary | ICD-10-CM

## 2020-01-14 LAB
ALBUMIN UR-MCNC: 30 MG/DL
APPEARANCE UR: CLEAR
BACTERIA #/AREA URNS HPF: ABNORMAL /HPF
BILIRUB UR QL STRIP: NEGATIVE
COLOR UR AUTO: YELLOW
GLUCOSE UR STRIP-MCNC: NEGATIVE MG/DL
HGB UR QL STRIP: ABNORMAL
KETONES UR STRIP-MCNC: NEGATIVE MG/DL
LEUKOCYTE ESTERASE UR QL STRIP: NEGATIVE
MUCOUS THREADS #/AREA URNS LPF: PRESENT /LPF
NITRATE UR QL: NEGATIVE
PH UR STRIP: 5.5 PH (ref 5–7)
RBC #/AREA URNS AUTO: ABNORMAL /HPF
SOURCE: ABNORMAL
SP GR UR STRIP: >1.03 (ref 1–1.03)
UROBILINOGEN UR STRIP-ACNC: 0.2 EU/DL (ref 0.2–1)
WBC #/AREA URNS AUTO: ABNORMAL /HPF

## 2020-01-14 PROCEDURE — 81001 URINALYSIS AUTO W/SCOPE: CPT | Performed by: HOSPITALIST

## 2020-01-14 PROCEDURE — 99213 OFFICE O/P EST LOW 20 MIN: CPT | Performed by: HOSPITALIST

## 2020-01-14 NOTE — PROGRESS NOTES
Pt came here because he felt run down. Pt has hx of spinal chord injury, he does not have sensation below nipple line. Pt usually felt run down if he got some infection. The most common for him is UTI. Due to that pt want to be tested. Pt is self cath himself 6x a day. As far as he noted there is no cloudiness that he notice.     Allergies   Allergen Reactions     No Known Drug Allergies        Past Medical History:   Diagnosis Date     Basal cell carcinoma      Malignant melanoma of other specified sites of skin 1980s    no recurrence     Personal history of other malignant neoplasm of skin 4/15/02    recurrent BCC scalp and face     Routine general medical examination at a health care facility 4/12/02     Scrotal varices     L scrotum       Acetaminophen (TYLENOL) 325 MG CAPS, Take 325-650 mg by mouth as needed  baclofen (LIORESAL) 20 MG tablet, Take 1 tablet (20 mg) by mouth 4 times daily  docusate sodium (ENEMEEZ) 283 MG enema, Place 1 enema rectally daily  gabapentin (NEURONTIN) 400 MG capsule, Take 1 capsule (400 mg) by mouth 3 times daily  saline 0.65 % (Soln) SOLN, Spray in nostril as needed  senna (SENOKOT) 8.6 MG tablet, Take 1 tablet by mouth daily  sodium chloride (SHANNA 128) 5 % ophthalmic ointment, 1 Application as needed for dry eyes  tiZANidine (ZANAFLEX) 4 MG tablet, Take 1 tablet (4 mg) by mouth 3 times daily  tolterodine (DETROL) 2 MG tablet, Take 1 tablet (2 mg) by mouth 2 times daily  UNABLE TO FIND, MEDICATION NAME: Guar Gum Powder  Vitamin D, Cholecalciferol, 25 MCG (1000 UT) CAPS,     No current facility-administered medications on file prior to visit.       Social History     Tobacco Use     Smoking status: Never Smoker     Smokeless tobacco: Never Used   Substance Use Topics     Alcohol use: Yes     Alcohol/week: 0.0 standard drinks     Comment: 2  drinks per week       ROS:  12 point ROS is done and aside that mention above all other review of system is negative    OBJECTIVE:  /67  (BP Location: Left arm, Patient Position: Sitting, Cuff Size: Adult Large)   Pulse 78   Temp 99.1  F (37.3  C) (Oral)   Resp 12   SpO2 97%   GENERAL APPEARANCE: healthy, alert and minimal distress  EYES: conjunctiva clear  EARS:no cerumen.   Ear canals no erythema, TM's intact no erythema .    NECK: supple, nontender, no lymphadenopathy  RESP: lungs clear to auscultation - no rales, rhonchi or wheezes  CV: regular rates and rhythm, normal S1 S2, no murmur noted  NEURO: awake, alert        Recent Results (from the past 168 hour(s))   *UA reflex to Microscopic and Culture ([place] and [facility] (except [facility])    Collection Time: 01/14/20  5:30 PM   Result Value Ref Range    Color Urine Yellow     Appearance Urine Clear     Glucose Urine Negative NEG^Negative mg/dL    Bilirubin Urine Negative NEG^Negative    Ketones Urine Negative NEG^Negative mg/dL    Specific Gravity Urine >1.030 1.003 - 1.035    Blood Urine Small (A) NEG^Negative    pH Urine 5.5 5.0 - 7.0 pH    Protein Albumin Urine 30 (A) NEG^Negative mg/dL    Urobilinogen Urine 0.2 0.2 - 1.0 EU/dL    Nitrite Urine Negative NEG^Negative    Leukocyte Esterase Urine Negative NEG^Negative    Source Midstream Urine    Urine Microscopic    Collection Time: 01/14/20  5:30 PM   Result Value Ref Range    WBC Urine 0 - 5 OTO5^0 - 5 /HPF    RBC Urine O - 2 OTO2^O - 2 /HPF    Bacteria Urine Few (A) NEG^Negative /HPF    Mucous Urine Present (A) NEG^Negative /LPF        ASSESSMENT:     ICD-10-CM    1. Dysuria R30.0 *UA reflex to Microscopic and Culture ([facility] and [facility] (except [place] and [place])     Urine Microscopic         PLAN:    Urine normal. Pt has some runny nose. Possible some viral uri. Tylenol and ibuprofen prn for  Pain or fever. Recommend to return to clinic if there is sign of fever for influenza swab   Lots of rest and fluids.    Deejay Marina MD MD

## 2020-01-14 NOTE — TELEPHONE ENCOUNTER
Reason for call:  Patient reporting a symptom    Symptom or request: has spinal cord injury.  Very weak muscles today. Feels out of the norm.  Was not more specific.     Duration (how long have symptoms been present): today    Have you been treated for this before? No     Additional comments:  I offered 545 with Mary, but  patient would like to get lab orders and come in for lab only today.    Phone Number patient can be reached at:  Cell number on file:    Telephone Information:   Mobile [phone]     Best Time:  any    Can we leave a detailed message on this number:  YES    Call taken on 1/14/2020 at 3:48 PM by Lacey Kim

## 2020-01-14 NOTE — TELEPHONE ENCOUNTER
UA/ UC    General weakness   No bladder pain  Denies fever or chills  PT and OT suggest urine sample  Denies confusion  Denies pain anywhere  Hard to move step to step even with walker  Advised OV tomorrow but he wants to be seen tonight. Suggested UC and he agreed and will come karma FREITAS RN

## 2020-03-15 ENCOUNTER — HEALTH MAINTENANCE LETTER (OUTPATIENT)
Age: 70
End: 2020-03-15

## 2020-05-04 ENCOUNTER — VIRTUAL VISIT (OUTPATIENT)
Dept: FAMILY MEDICINE | Facility: CLINIC | Age: 70
End: 2020-05-04
Payer: COMMERCIAL

## 2020-05-04 DIAGNOSIS — T83.511A URINARY TRACT INFECTION ASSOCIATED WITH CATHETERIZATION OF URINARY TRACT, UNSPECIFIED INDWELLING URINARY CATHETER TYPE, INITIAL ENCOUNTER (H): ICD-10-CM

## 2020-05-04 DIAGNOSIS — N39.0 URINARY TRACT INFECTION ASSOCIATED WITH CATHETERIZATION OF URINARY TRACT, UNSPECIFIED INDWELLING URINARY CATHETER TYPE, INITIAL ENCOUNTER (H): ICD-10-CM

## 2020-05-04 DIAGNOSIS — R82.90 ABNORMAL URINE SEDIMENT: Primary | ICD-10-CM

## 2020-05-04 LAB
ALBUMIN UR-MCNC: NEGATIVE MG/DL
APPEARANCE UR: CLEAR
BACTERIA #/AREA URNS HPF: ABNORMAL /HPF
BILIRUB UR QL STRIP: NEGATIVE
COLOR UR AUTO: YELLOW
GLUCOSE UR STRIP-MCNC: NEGATIVE MG/DL
HGB UR QL STRIP: ABNORMAL
KETONES UR STRIP-MCNC: NEGATIVE MG/DL
LEUKOCYTE ESTERASE UR QL STRIP: ABNORMAL
NITRATE UR QL: POSITIVE
PH UR STRIP: 5.5 PH (ref 5–7)
RBC #/AREA URNS AUTO: ABNORMAL /HPF
SOURCE: ABNORMAL
SP GR UR STRIP: 1.02 (ref 1–1.03)
UROBILINOGEN UR STRIP-ACNC: 0.2 EU/DL (ref 0.2–1)
WBC #/AREA URNS AUTO: >100 /HPF

## 2020-05-04 PROCEDURE — 99213 OFFICE O/P EST LOW 20 MIN: CPT | Mod: TEL | Performed by: INTERNAL MEDICINE

## 2020-05-04 PROCEDURE — 87186 SC STD MICRODIL/AGAR DIL: CPT | Performed by: INTERNAL MEDICINE

## 2020-05-04 PROCEDURE — 87088 URINE BACTERIA CULTURE: CPT | Performed by: INTERNAL MEDICINE

## 2020-05-04 PROCEDURE — 87086 URINE CULTURE/COLONY COUNT: CPT | Performed by: INTERNAL MEDICINE

## 2020-05-04 PROCEDURE — 81001 URINALYSIS AUTO W/SCOPE: CPT | Performed by: INTERNAL MEDICINE

## 2020-05-04 RX ORDER — SULFAMETHOXAZOLE/TRIMETHOPRIM 800-160 MG
1 TABLET ORAL 2 TIMES DAILY
Qty: 20 TABLET | Refills: 0 | Status: SHIPPED | OUTPATIENT
Start: 2020-05-04 | End: 2021-04-19

## 2020-05-04 NOTE — LETTER
May 6, 2020      José Antonio Saha  [address]  [place] [address]        Dear ,    We are writing to inform you of your test results.    The following letter pertains to your most recent diagnostic tests:     The urine culture is positive for E. Coli that should be sensitive to the Bactrim prescribed for your UTI.  Please complete that course as directed.    Resulted Orders   UA with Microscopic reflex to Culture   Result Value Ref Range    Color Urine Yellow     Appearance Urine Clear     Glucose Urine Negative NEG^Negative mg/dL    Bilirubin Urine Negative NEG^Negative    Ketones Urine Negative NEG^Negative mg/dL    Specific Gravity Urine 1.025 1.003 - 1.035    pH Urine 5.5 5.0 - 7.0 pH    Protein Albumin Urine Negative NEG^Negative mg/dL    Urobilinogen Urine 0.2 0.2 - 1.0 EU/dL    Nitrite Urine Positive (A) NEG^Negative    Blood Urine Small (A) NEG^Negative    Leukocyte Esterase Urine Moderate (A) NEG^Negative    Source Midstream Urine     WBC Urine >100 (A) OTO5^0 - 5 /HPF    RBC Urine 10-25 (A) OTO2^O - 2 /HPF    Bacteria Urine Many (A) NEG^Negative /HPF   Urine Culture Aerobic Bacterial   Result Value Ref Range    Specimen Description Midstream Urine     Culture Micro 50,000 to 100,000 colonies/mL  Escherichia coli   (A)        If you have any questions or concerns, please call the clinic at the number listed above.       Sincerely,        Kirk Chavez MD

## 2020-05-04 NOTE — RESULT ENCOUNTER NOTE
As we discussed,  UA suggests UTI (+nitrite and > 100 WBCs)  Start Bactrim as directed  I will send you the result of your urine culture when it is available

## 2020-05-04 NOTE — PROGRESS NOTES
"José Antonio Saha is a 70 year old male who is being evaluated via a billable telephone visit.      The patient has been notified of following:     \"This telephone visit will be conducted via a call between you and your physician/provider. We have found that certain health care needs can be provided without the need for a physical exam.  This service lets us provide the care you need with a short phone conversation.  If a prescription is necessary we can send it directly to your pharmacy.  If lab work is needed we can place an order for that and you can then stop by our lab to have the test done at a later time.    Telephone visits are billed at different rates depending on your insurance coverage. During this emergency period, for some insurers they may be billed the same as an in-person visit.  Please reach out to your insurance provider with any questions.    If during the course of the call the physician/provider feels a telephone visit is not appropriate, you will not be charged for this service.\"    Patient has given verbal consent for Telephone visit?  Yes    What phone number would you like to be contacted at? [phone]    How would you like to obtain your AVS? Mail a copy    Subjective     José Antonio Saha is a 70 year old male who presents to clinic today for the following health issues:    HPI         70 year old man with neurogenic bladder due to C. Spine injury  Noted several day history of abnormal, foul smelling urinary sediment upon straight catheterization   No associated fevers, chills, nausea, vomiting, flank pain, fatigue or malaise     Patient Active Problem List   Diagnosis     Special screening for malignant neoplasm of prostate     CARDIOVASCULAR SCREENING; LDL GOAL LESS THAN 160     Basal cell carcinoma of skin of trunk     Basal cell carcinoma of nose     Sessile colonic polyp     Right shoulder pain, unspecified chronicity     Cervical segment dysfunction     Right supraspinatus " tendinitis     Thoracic segment dysfunction     Past Surgical History:   Procedure Laterality Date     C NONSPECIFIC PROCEDURE  6/27/02    colonoscopy with polypectomy x3, fulguration x1     C NONSPECIFIC PROCEDURE      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     C NONSPECIFIC PROCEDURE      multiple skin Bxs     C NONSPECIFIC PROCEDURE      tonsillectomy     COLONOSCOPY  11/19/2013    Procedure: COMBINED COLONOSCOPY, SINGLE BIOPSY/POLYPECTOMY BY BIOPSY;  COLONOSCOPY;  Surgeon: Adam Lopez MD;  Location:  GI     COLONOSCOPY N/A 9/29/2016    Procedure: COLONOSCOPY;  Surgeon: Paul Ken MD;  Location:  GI       Social History     Tobacco Use     Smoking status: Never Smoker     Smokeless tobacco: Never Used   Substance Use Topics     Alcohol use: Yes     Alcohol/week: 0.0 standard drinks     Comment: 2  drinks per week     Family History   Problem Relation Age of Onset     Cancer Mother         thyroid and lung     Prostate Cancer Father 65        uncle also had prostate Ca     Cerebrovascular Disease Father      Hypertension Father      Arthritis Daughter      Skin Cancer Daughter      Breast Cancer Sister      Cancer - colorectal No family hx of      Diabetes No family hx of          Current Outpatient Medications   Medication Sig Dispense Refill     Acetaminophen (TYLENOL) 325 MG CAPS Take 325-650 mg by mouth as needed       baclofen (LIORESAL) 20 MG tablet Take 1 tablet (20 mg) by mouth 4 times daily 360 tablet 3     docusate sodium (ENEMEEZ) 283 MG enema Place 1 enema rectally daily 90 enema 3     gabapentin (NEURONTIN) 400 MG capsule Take 1 capsule (400 mg) by mouth 3 times daily 270 capsule 3     saline 0.65 % (Soln) SOLN Spray in nostril as needed       senna (SENOKOT) 8.6 MG tablet Take 1 tablet by mouth daily       sodium chloride (SHANNA 128) 5 % ophthalmic ointment 1 Application as needed for dry eyes       tiZANidine (ZANAFLEX) 4 MG tablet Take 1 tablet (4 mg) by mouth 3 times daily 270  tablet 3     tolterodine (DETROL) 2 MG tablet Take 1 tablet (2 mg) by mouth 2 times daily 180 tablet 3     UNABLE TO FIND MEDICATION NAME: Guar Gum Powder       Vitamin D, Cholecalciferol, 25 MCG (1000 UT) CAPS        Allergies   Allergen Reactions     No Known Drug Allergies        Reviewed and updated as needed this visit by Provider         Review of Systems   ROS COMP: Pertinent +/-'s in HPI        Objective   Reported vitals:  There were no vitals taken for this visit.     PSYCH: Alert and oriented times 3; coherent speech, normal   rate and volume, able to articulate logical thoughts, able   to abstract reason, no tangential thoughts, no hallucinations   or delusions  His affect is normal  RESP: No cough, no audible wheezing, able to talk in full sentences  Remainder of exam unable to be completed due to telephone visits    Diagnostic Test Results:  Labs pending         Assessment/Plan:  1. Abnormal urine sediment  Check UA this afternoon  His wife will  sterile container today and he will straight cath in his van and she will resubmit to lab for analysis this afternoon  This will avoid him having potential exposure to COVID   - UA with Microscopic reflex to Culture  - Urine Culture Aerobic Bacterial        Phone call duration:  11 minutes    Kirk Chavez MD

## 2020-05-05 LAB
BACTERIA SPEC CULT: ABNORMAL
SPECIMEN SOURCE: ABNORMAL

## 2020-05-06 NOTE — RESULT ENCOUNTER NOTE
The following letter pertains to your most recent diagnostic tests:    The urine culture is positive for E. Coli that should be sensitive to the Bactrim prescribed for your UTI.  Please complete that course as directed.        Sincerely,    Dr. Chavez

## 2020-06-02 ENCOUNTER — OFFICE VISIT (OUTPATIENT)
Dept: FAMILY MEDICINE | Facility: CLINIC | Age: 70
End: 2020-06-02
Payer: COMMERCIAL

## 2020-06-02 VITALS
SYSTOLIC BLOOD PRESSURE: 105 MMHG | HEART RATE: 79 BPM | DIASTOLIC BLOOD PRESSURE: 62 MMHG | BODY MASS INDEX: 21.76 KG/M2 | TEMPERATURE: 97.3 F | WEIGHT: 156 LBS | OXYGEN SATURATION: 96 %

## 2020-06-02 DIAGNOSIS — M99.01 CERVICAL SEGMENT DYSFUNCTION: ICD-10-CM

## 2020-06-02 DIAGNOSIS — N31.9 NEUROGENIC BLADDER: ICD-10-CM

## 2020-06-02 DIAGNOSIS — Z01.818 PREOP GENERAL PHYSICAL EXAM: Primary | ICD-10-CM

## 2020-06-02 PROCEDURE — 99214 OFFICE O/P EST MOD 30 MIN: CPT | Performed by: INTERNAL MEDICINE

## 2020-06-02 NOTE — PROGRESS NOTES
[address] [address]  [phone]  Dept: [phone]    PRE-OP EVALUATION:  Today's date: 2020    José Antonio Saha (: 1950) presents for pre-operative evaluation assessment as requested by Hui Fisher, GWENDOLYN.  He requires evaluation and anesthesia risk assessment prior to undergoing surgery/procedure for treatment of cystoscopy and Botox injection..    Proposed Surgery/ Procedure: Cystoscopy Botox Injection   Date of Surgery/ Procedure: 2020  Time of Surgery/ Procedure:   Hospital/Surgical Facility: [facility]   Fax number for surgical facility: TBD  Primary Physician: Kirk Chavez  Type of Anesthesia Anticipated: to be determined    Patient has a Health Care Directive or Living Will:  NO    1. NO - Do you have a history of heart attack, stroke, stent, bypass or surgery on an artery in the head, neck, heart or legs?  2. NO - Do you ever have any pain or discomfort in your chest?  3. NO - Do you have a history of  Heart Failure?  4. NO - Are you troubled by shortness of breath when: walking on the level, up a slight hill or at night?  5. NO - Do you currently have a cold, bronchitis or other respiratory infection?  6. NO - Do you have a cough, shortness of breath or wheezing?  7. NO - Do you sometimes get pains in the calves of your legs when you walk?  8. NO - Do you or anyone in your family have previous history of blood clots?  9. NO - Do you or does anyone in your family have a serious bleeding problem such as prolonged bleeding following surgeries or cuts?  10. NO - Have you ever had problems with anemia or been told to take iron pills?  11. NO - Have you had any abnormal blood loss such as black, tarry or bloody stools, or abnormal vaginal bleeding?  12. NO - Have you ever had a blood transfusion?  13. NO - Have you or any of your relatives ever had problems with anesthesia?  14. NO - Do you have sleep apnea,  excessive snoring or daytime drowsiness?  15. NO - Do you have any prosthetic heart valves?  16. NO - Do you have prosthetic joints?  17. NO - Is there any chance that you may be pregnant?      HPI:     HPI related to upcoming procedure: Patient presenting for preop clearance.  Denies any current active symptoms of chest pain, shortness of breath, cough, difficulty breathing, fever or chills.  No history of leg swelling or DVT.  No history of bleeding diatheses.  He has C-spine injury currently on motor wheelchair.  He has neurogenic bladder and he gets recurrent BOTOX  injection of bladder outlet, he thinks dose need to be increased.  Also has stopped taking Detrol, he does not think it was helping and was causing him to have dry mouth dry eyes.  Denies any abdominal pain, currently denies any symptoms of urinary infection; no cloudy urine or foul-smelling urine that is what he usually presents with for UTI.  Recently treated for E. coli at the beginning of this month with Bactrim; finished the course.  No diarrhea.  No other systemic complaints.  He will be undergoing the procedure under sedation.      MEDICAL HISTORY:     Patient Active Problem List    Diagnosis Date Noted     Right shoulder pain, unspecified chronicity 05/07/2018     Priority: Medium     Cervical segment dysfunction 05/07/2018     Priority: Medium     Right supraspinatus tendinitis 05/07/2018     Priority: Medium     Thoracic segment dysfunction 05/07/2018     Priority: Medium     Sessile colonic polyp 07/29/2016     Priority: Medium     Basal cell carcinoma of skin of trunk 11/21/2013     Priority: Medium     Basal cell carcinoma of nose 11/21/2013     Priority: Medium     s/p Mohs       CARDIOVASCULAR SCREENING; LDL GOAL LESS THAN 160 09/07/2011     Priority: Medium     Special screening for malignant neoplasm of prostate 09/15/2003     Priority: Medium      Past Medical History:   Diagnosis Date     Basal cell carcinoma      Malignant  melanoma of other specified sites of skin 1980s    no recurrence     Personal history of other malignant neoplasm of skin 4/15/02    recurrent BCC scalp and face     Routine general medical examination at a health care facility 4/12/02     Scrotal varices     L scrotum     Past Surgical History:   Procedure Laterality Date     C NONSPECIFIC PROCEDURE  6/27/02    colonoscopy with polypectomy x3, fulguration x1     C NONSPECIFIC PROCEDURE      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     C NONSPECIFIC PROCEDURE      multiple skin Bxs     C NONSPECIFIC PROCEDURE      tonsillectomy     COLONOSCOPY  11/19/2013    Procedure: COMBINED COLONOSCOPY, SINGLE BIOPSY/POLYPECTOMY BY BIOPSY;  COLONOSCOPY;  Surgeon: Adam Lopez MD;  Location:  GI     COLONOSCOPY N/A 9/29/2016    Procedure: COLONOSCOPY;  Surgeon: Paul Ken MD;  Location:  GI     Current Outpatient Medications   Medication Sig Dispense Refill     Acetaminophen (TYLENOL) 325 MG CAPS Take 325-650 mg by mouth as needed       baclofen (LIORESAL) 20 MG tablet Take 1 tablet (20 mg) by mouth 4 times daily 360 tablet 3     docusate sodium (ENEMEEZ) 283 MG enema Place 1 enema rectally daily 90 enema 3     gabapentin (NEURONTIN) 400 MG capsule Take 1 capsule (400 mg) by mouth 3 times daily 270 capsule 3     saline 0.65 % (Soln) SOLN Spray in nostril as needed       senna (SENOKOT) 8.6 MG tablet Take 1 tablet by mouth daily       sodium chloride (SHANNA 128) 5 % ophthalmic ointment 1 Application as needed for dry eyes       tiZANidine (ZANAFLEX) 4 MG tablet Take 1 tablet (4 mg) by mouth 3 times daily 270 tablet 3     UNABLE TO FIND MEDICATION NAME: Guar Gum Powder       Vitamin D, Cholecalciferol, 25 MCG (1000 UT) CAPS        sulfamethoxazole-trimethoprim (BACTRIM DS) 800-160 MG tablet Take 1 tablet by mouth 2 times daily 20 tablet 0     tolterodine (DETROL) 2 MG tablet Take 1 tablet (2 mg) by mouth 2 times daily 180 tablet 3     OTC products: none    Allergies    Allergen Reactions     No Known Drug Allergies       Latex Allergy: NO    Social History     Tobacco Use     Smoking status: Never Smoker     Smokeless tobacco: Never Used   Substance Use Topics     Alcohol use: Yes     Alcohol/week: 0.0 standard drinks     Comment: 2  drinks per week     History   Drug Use Not on file       REVIEW OF SYSTEMS:   Constitutional, neuro, ENT, endocrine, pulmonary, cardiac, gastrointestinal, genitourinary, musculoskeletal, integument and psychiatric systems are negative, except as otherwise noted.    EXAM:   /62 (BP Location: Right arm, Patient Position: Sitting, Cuff Size: Adult Regular)   Pulse 79   Temp 97.3  F (36.3  C) (Tympanic)   Wt 70.8 kg (156 lb)   SpO2 96%   BMI 21.76 kg/m      GENERAL APPEARANCE: healthy, alert and no distress     EYES: EOMI,  PERRL     NECK: no adenopathy, no asymmetry, masses, or scars and thyroid normal to palpation     RESP: lungs clear to auscultation - no rales, rhonchi or wheezes     CV: regular rates and rhythm, normal S1 S2, no S3 or S4 and no murmur, click or rub     ABDOMEN:  soft, nontender, no HSM or masses and bowel sounds normal     MS: extremities normal- no gross deformities noted, no evidence of inflammation in joints, FROM in all extremities.     SKIN: no suspicious lesions or rashes     NEURO: Spasticity of upper extremities, mentation intact and speech normal     PSYCH: mentation appears normal. and affect normal/bright     LYMPHATICS: No cervical adenopathy    DIAGNOSTICS:   Patient refused labs today.    Recent Labs   Lab Test 05/08/18  0921 07/29/16  1207   HGB 14.7 13.7    265    140   POTASSIUM 4.5 4.1   CR 0.94 0.82        IMPRESSION:   Reason for surgery/procedure: Preop clearance  Diagnosis/reason for consult: Botox injections for neurogenic bladder    The proposed surgical procedure is considered LOW risk.    REVISED CARDIAC RISK INDEX  The patient has the following serious cardiovascular risks for  perioperative complications such as (MI, PE, VFib and 3  AV Block):  No serious cardiac risks  INTERPRETATION: 0 risks: Class I (very low risk - 0.4% complication rate)    The patient has the following additional risks for perioperative complications:  No identified additional risks  Immobile, increased risk of DVTs/VTE due to immobility      ICD-10-CM    1. Preop general physical exam  Z01.818    2. Neurogenic bladder  N31.9    3. Cervical segment dysfunction  M99.01        RECOMMENDATIONS:     --Consult hospital rounder / IM to assist post-op medical management    --Patient is to take all scheduled medications on the day of surgery EXCEPT for modifications listed below.    APPROVAL GIVEN to proceed with proposed procedure, without further diagnostic evaluation  Advised patient repeat labs today he declined.  Discussed medications in the perioperative period.  Advised to continue on medications through surgery.  He needs to check his LFTs being on chronic tizanidine, may close hepatotoxicity.  Closely monitor postoperatively for any occurrence of VTE/DVT especially with his history of immobility.  Monitor closely his respiratory status postoperatively for any O2 desaturation.  Closely monitor hemodynamics Intra-and postoperatively    Signed Electronically by: Christine Munguia MD    Copy of this evaluation report is provided to requesting physician.    [place] Preop Guidelines    Revised Cardiac Risk Index

## 2020-07-05 ENCOUNTER — OFFICE VISIT (OUTPATIENT)
Dept: URGENT CARE | Facility: URGENT CARE | Age: 70
End: 2020-07-05
Payer: COMMERCIAL

## 2020-07-05 VITALS
OXYGEN SATURATION: 96 % | DIASTOLIC BLOOD PRESSURE: 59 MMHG | HEART RATE: 67 BPM | SYSTOLIC BLOOD PRESSURE: 90 MMHG | TEMPERATURE: 98.8 F | WEIGHT: 158 LBS | BODY MASS INDEX: 21.4 KG/M2 | HEIGHT: 72 IN | RESPIRATION RATE: 18 BRPM

## 2020-07-05 DIAGNOSIS — N39.0 URINARY TRACT INFECTION WITHOUT HEMATURIA, SITE UNSPECIFIED: Primary | ICD-10-CM

## 2020-07-05 LAB
ALBUMIN UR-MCNC: 100 MG/DL
AMORPH CRY #/AREA URNS HPF: ABNORMAL /HPF
APPEARANCE UR: ABNORMAL
BACTERIA #/AREA URNS HPF: ABNORMAL /HPF
BILIRUB UR QL STRIP: NEGATIVE
COLOR UR AUTO: YELLOW
GLUCOSE UR STRIP-MCNC: NEGATIVE MG/DL
HGB UR QL STRIP: ABNORMAL
KETONES UR STRIP-MCNC: NEGATIVE MG/DL
LEUKOCYTE ESTERASE UR QL STRIP: ABNORMAL
NITRATE UR QL: POSITIVE
NON-SQ EPI CELLS #/AREA URNS LPF: ABNORMAL /LPF
PH UR STRIP: 5 PH (ref 5–7)
RBC #/AREA URNS AUTO: >100 /HPF
SOURCE: ABNORMAL
SP GR UR STRIP: >1.03 (ref 1–1.03)
UROBILINOGEN UR STRIP-ACNC: 0.2 EU/DL (ref 0.2–1)
WBC #/AREA URNS AUTO: ABNORMAL /HPF

## 2020-07-05 PROCEDURE — 87088 URINE BACTERIA CULTURE: CPT | Performed by: PREVENTIVE MEDICINE

## 2020-07-05 PROCEDURE — 87086 URINE CULTURE/COLONY COUNT: CPT | Performed by: PREVENTIVE MEDICINE

## 2020-07-05 PROCEDURE — 81001 URINALYSIS AUTO W/SCOPE: CPT | Performed by: PREVENTIVE MEDICINE

## 2020-07-05 PROCEDURE — 87186 SC STD MICRODIL/AGAR DIL: CPT | Performed by: PREVENTIVE MEDICINE

## 2020-07-05 PROCEDURE — 99214 OFFICE O/P EST MOD 30 MIN: CPT | Performed by: PREVENTIVE MEDICINE

## 2020-07-05 RX ORDER — SULFAMETHOXAZOLE/TRIMETHOPRIM 800-160 MG
1 TABLET ORAL 2 TIMES DAILY
Qty: 20 TABLET | Refills: 0 | Status: SHIPPED | OUTPATIENT
Start: 2020-07-05 | End: 2021-09-20

## 2020-07-05 ASSESSMENT — MIFFLIN-ST. JEOR: SCORE: 1524.68

## 2020-07-05 NOTE — PROGRESS NOTES
SUBJECTIVE:   José Antonio Saha is a 70 year old male who  presents today for a possible UTI. Pt has a neurogenic bladder.  Sign of abnormal urinary sediment have been going on for 3day(s).  Hematuria no.  sudden onsetand moderate.  There is no history of fever, chills, nausea or vomiting.  No history of penile discharge. This patient does have a history of urinary tract infections. Patient denies long duration, rigors, flank pain, temperature > 101 degrees F., Vomiting, significant nausea or diarrhea, taking Coumadin and GFR less than 30 within the last year.   Pt self caths every 4 hours.  Noticed urine sediment is cloudier in past 2 days.  No fevers or chills.  Otherwise feels fine.    Past Medical History:   Diagnosis Date     Basal cell carcinoma      Malignant melanoma of other specified sites of skin 1980s    no recurrence     Personal history of other malignant neoplasm of skin 4/15/02    recurrent BCC scalp and face     Routine general medical examination at a health care facility 4/12/02     Scrotal varices     L scrotum     Current Outpatient Medications   Medication Sig Dispense Refill     Acetaminophen (TYLENOL) 325 MG CAPS Take 325-650 mg by mouth as needed       baclofen (LIORESAL) 20 MG tablet Take 1 tablet (20 mg) by mouth 4 times daily 360 tablet 3     docusate sodium (ENEMEEZ) 283 MG enema Place 1 enema rectally daily 90 enema 3     gabapentin (NEURONTIN) 400 MG capsule Take 1 capsule (400 mg) by mouth 3 times daily 270 capsule 3     saline 0.65 % (Soln) SOLN Spray in nostril as needed       senna (SENOKOT) 8.6 MG tablet Take 1 tablet by mouth daily       sodium chloride (SHANNA 128) 5 % ophthalmic ointment 1 Application as needed for dry eyes       sulfamethoxazole-trimethoprim (BACTRIM DS) 800-160 MG tablet Take 1 tablet by mouth 2 times daily for 10 days 20 tablet 0     sulfamethoxazole-trimethoprim (BACTRIM DS) 800-160 MG tablet Take 1 tablet by mouth 2 times daily 20 tablet 0     tiZANidine  (ZANAFLEX) 4 MG tablet Take 1 tablet (4 mg) by mouth 3 times daily 270 tablet 3     tolterodine (DETROL) 2 MG tablet Take 1 tablet (2 mg) by mouth 2 times daily 180 tablet 3     UNABLE TO FIND MEDICATION NAME: Guar Gum Powder       Vitamin D, Cholecalciferol, 25 MCG (1000 UT) CAPS        Social History     Tobacco Use     Smoking status: Never Smoker     Smokeless tobacco: Never Used   Substance Use Topics     Alcohol use: Yes     Alcohol/week: 0.0 standard drinks     Comment: 2  drinks per week     Past Medical History:   Diagnosis Date     Basal cell carcinoma      Malignant melanoma of other specified sites of skin 1980s    no recurrence     Personal history of other malignant neoplasm of skin 4/15/02    recurrent BCC scalp and face     Routine general medical examination at a health care facility 4/12/02     Scrotal varices     L scrotum       ROS:   Review of systems negative except as stated above.    OBJECTIVE:  BP 90/59 (BP Location: Right arm, Patient Position: Sitting, Cuff Size: Adult Regular)   Pulse 67   Temp 98.8  F (37.1  C) (Tympanic)   Resp 18   Ht 1.829 m (6')   Wt 71.7 kg (158 lb)   SpO2 96%   BMI 21.43 kg/m    GENERAL APPEARANCE: healthy, alert and no distress  RESP: lungs clear to auscultation - no rales, rhonchi or wheezes  CV: regular rates and rhythm, normal S1 S2, no murmur noted  ABDOMEN:  soft, nontender, no HSM or masses and bowel sounds normal  BACK: No CVA tenderness  SKIN: no suspicious lesions or rashes    UA - 25-50 wbc, 50-10 rbc, +bacteria    ASSESSMENT:   Complicated UTI  Bactrim DS two times per day for 10 days    PLAN:  As per ordered above  Drink plenty of fluids.  Prevention and treatment of UTI's discussed.Signs and symptoms of pyelonephritis mentioned.  Follow up with primary care physician if not improving

## 2020-07-05 NOTE — PATIENT INSTRUCTIONS
Complicated UTI  Bactrim DS two times per day for 10 days    PLAN:  As per ordered above  Drink plenty of fluids.  Prevention and treatment of UTI's discussed.Signs and symptoms of pyelonephritis mentioned.  Follow up with primary care physician if not improving

## 2020-07-06 LAB
BACTERIA SPEC CULT: ABNORMAL
SPECIMEN SOURCE: ABNORMAL

## 2020-07-09 ENCOUNTER — TRANSFERRED RECORDS (OUTPATIENT)
Dept: HEALTH INFORMATION MANAGEMENT | Facility: CLINIC | Age: 70
End: 2020-07-09

## 2020-08-03 ENCOUNTER — OFFICE VISIT (OUTPATIENT)
Dept: URGENT CARE | Facility: URGENT CARE | Age: 70
End: 2020-08-03
Payer: COMMERCIAL

## 2020-08-03 VITALS
DIASTOLIC BLOOD PRESSURE: 69 MMHG | TEMPERATURE: 97.1 F | RESPIRATION RATE: 17 BRPM | OXYGEN SATURATION: 94 % | SYSTOLIC BLOOD PRESSURE: 104 MMHG | HEART RATE: 72 BPM

## 2020-08-03 DIAGNOSIS — R31.9 URINARY TRACT INFECTION WITH HEMATURIA, SITE UNSPECIFIED: Primary | ICD-10-CM

## 2020-08-03 DIAGNOSIS — R30.0 DYSURIA: ICD-10-CM

## 2020-08-03 DIAGNOSIS — R82.90 NONSPECIFIC FINDING ON EXAMINATION OF URINE: ICD-10-CM

## 2020-08-03 DIAGNOSIS — N39.0 URINARY TRACT INFECTION WITH HEMATURIA, SITE UNSPECIFIED: Primary | ICD-10-CM

## 2020-08-03 LAB
ALBUMIN UR-MCNC: 30 MG/DL
APPEARANCE UR: ABNORMAL
BACTERIA #/AREA URNS HPF: ABNORMAL /HPF
BILIRUB UR QL STRIP: NEGATIVE
COLOR UR AUTO: YELLOW
GLUCOSE UR STRIP-MCNC: NEGATIVE MG/DL
HGB UR QL STRIP: ABNORMAL
KETONES UR STRIP-MCNC: NEGATIVE MG/DL
LEUKOCYTE ESTERASE UR QL STRIP: ABNORMAL
NITRATE UR QL: NEGATIVE
NON-SQ EPI CELLS #/AREA URNS LPF: ABNORMAL /LPF
PH UR STRIP: 5.5 PH (ref 5–7)
RBC #/AREA URNS AUTO: ABNORMAL /HPF
SOURCE: ABNORMAL
SP GR UR STRIP: >1.03 (ref 1–1.03)
UROBILINOGEN UR STRIP-ACNC: 0.2 EU/DL (ref 0.2–1)
WBC #/AREA URNS AUTO: ABNORMAL /HPF

## 2020-08-03 PROCEDURE — 87086 URINE CULTURE/COLONY COUNT: CPT | Performed by: NURSE PRACTITIONER

## 2020-08-03 PROCEDURE — 87088 URINE BACTERIA CULTURE: CPT | Performed by: NURSE PRACTITIONER

## 2020-08-03 PROCEDURE — 87186 SC STD MICRODIL/AGAR DIL: CPT | Performed by: NURSE PRACTITIONER

## 2020-08-03 PROCEDURE — 81001 URINALYSIS AUTO W/SCOPE: CPT | Performed by: NURSE PRACTITIONER

## 2020-08-03 PROCEDURE — 99214 OFFICE O/P EST MOD 30 MIN: CPT | Performed by: NURSE PRACTITIONER

## 2020-08-03 RX ORDER — CEFDINIR 300 MG/1
300 CAPSULE ORAL 2 TIMES DAILY
Qty: 20 CAPSULE | Refills: 0 | Status: SHIPPED | OUTPATIENT
Start: 2020-08-03 | End: 2020-08-13

## 2020-08-03 ASSESSMENT — ENCOUNTER SYMPTOMS
BACK PAIN: 0
HEMATURIA: 1
ABDOMINAL PAIN: 0
NAUSEA: 0
FLANK PAIN: 0
FEVER: 0
VOMITING: 0

## 2020-08-03 NOTE — PROGRESS NOTES
SUBJECTIVE:   José Antonio Saha is a 70 year old male presenting with a chief complaint of   Chief Complaint   Patient presents with     Urgent Care     UTI     Pt states symptoms started yesterday odor and cloudy urine had one on 07/05 and had 10 antibiotics bactrim.        He is an established patient of [facility].    UTI    Onset of symptoms was 1day(s).  Course of illness is worsening  Severity moderate  Current and associated symptoms odorous urine, cloudy urine, urine sediment, small amount of blood noted.   Treatment and measures tried None  Predisposing factors include: History of neurogenic bladder and does straight cath himself. Patient does follow with urology at [facility].   Patient denies rigors, flank pain, temperature > 101 degrees F. and vomiting    Patient was seen at urgent care on 7/5/2020 for complicated UTI. Was given bactrim twice a day for 10 days. Urine culture grew out E. Coli and was sensitive to bactrim.     Review of Systems   Constitutional: Negative for fever.   Gastrointestinal: Negative for abdominal pain, nausea and vomiting.   Genitourinary: Positive for hematuria. Negative for flank pain, penile swelling and scrotal swelling.   Musculoskeletal: Negative for back pain.       Past Medical History:   Diagnosis Date     Basal cell carcinoma      Malignant melanoma of other specified sites of skin 1980s    no recurrence     Personal history of other malignant neoplasm of skin 4/15/02    recurrent BCC scalp and face     Routine general medical examination at a health care facility 4/12/02     Scrotal varices     L scrotum     Family History   Problem Relation Age of Onset     Cancer Mother         thyroid and lung     Prostate Cancer Father 65        uncle also had prostate Ca     Cerebrovascular Disease Father      Hypertension Father      Arthritis Daughter      Skin Cancer Daughter      Breast Cancer Sister      Cancer - colorectal No family hx of      Diabetes No family hx of       Current Outpatient Medications   Medication Sig Dispense Refill     Acetaminophen (TYLENOL) 325 MG CAPS Take 325-650 mg by mouth as needed       baclofen (LIORESAL) 20 MG tablet Take 1 tablet (20 mg) by mouth 4 times daily 360 tablet 3     cefdinir (OMNICEF) 300 MG capsule Take 1 capsule (300 mg) by mouth 2 times daily for 10 days 20 capsule 0     docusate sodium (ENEMEEZ) 283 MG enema Place 1 enema rectally daily 90 enema 3     gabapentin (NEURONTIN) 400 MG capsule Take 1 capsule (400 mg) by mouth 3 times daily 270 capsule 3     saline 0.65 % (Soln) SOLN Spray in nostril as needed       senna (SENOKOT) 8.6 MG tablet Take 1 tablet by mouth daily       sodium chloride (SHANNA 128) 5 % ophthalmic ointment 1 Application as needed for dry eyes       tiZANidine (ZANAFLEX) 4 MG tablet Take 1 tablet (4 mg) by mouth 3 times daily 270 tablet 3     tolterodine (DETROL) 2 MG tablet Take 1 tablet (2 mg) by mouth 2 times daily 180 tablet 3     UNABLE TO FIND MEDICATION NAME: Guar Gum Powder       Vitamin D, Cholecalciferol, 25 MCG (1000 UT) CAPS        sulfamethoxazole-trimethoprim (BACTRIM DS) 800-160 MG tablet Take 1 tablet by mouth 2 times daily (Patient not taking: Reported on 8/3/2020) 20 tablet 0     Social History     Tobacco Use     Smoking status: Never Smoker     Smokeless tobacco: Never Used   Substance Use Topics     Alcohol use: Yes     Alcohol/week: 0.0 standard drinks     Comment: 2  drinks per week       OBJECTIVE  /69   Pulse 72   Temp 97.1  F (36.2  C) (Oral)   Resp 17   SpO2 94%     Physical Exam  Vitals signs and nursing note reviewed.   Constitutional:       Appearance: He is well-groomed.      Comments: Sitting in wheelchair, appears comfortable.    Cardiovascular:      Rate and Rhythm: Normal rate and regular rhythm.      Heart sounds: Normal heart sounds.   Pulmonary:      Effort: Pulmonary effort is normal.      Breath sounds: Normal breath sounds and air entry.   Abdominal:      General:  Bowel sounds are normal.      Palpations: Abdomen is soft.   Neurological:      Mental Status: He is alert.   Psychiatric:         Behavior: Behavior is cooperative.         Labs:  Results for orders placed or performed in visit on 08/03/20 (from the past 24 hour(s))   UA reflex to Microscopic and Culture    Specimen: Midstream Urine   Result Value Ref Range    Color Urine Yellow     Appearance Urine Slightly Cloudy     Glucose Urine Negative NEG^Negative mg/dL    Bilirubin Urine Negative NEG^Negative    Ketones Urine Negative NEG^Negative mg/dL    Specific Gravity Urine >1.030 1.003 - 1.035    Blood Urine Moderate (A) NEG^Negative    pH Urine 5.5 5.0 - 7.0 pH    Protein Albumin Urine 30 (A) NEG^Negative mg/dL    Urobilinogen Urine 0.2 0.2 - 1.0 EU/dL    Nitrite Urine Negative NEG^Negative    Leukocyte Esterase Urine Small (A) NEG^Negative    Source Midstream Urine    Urine Microscopic   Result Value Ref Range    WBC Urine 25-50 (A) OTO5^0 - 5 /HPF    RBC Urine 5-10 (A) OTO2^O - 2 /HPF    Squamous Epithelial /LPF Urine Few FEW^Few /LPF    Bacteria Urine Few (A) NEG^Negative /HPF           ASSESSMENT:      ICD-10-CM    1. Urinary tract infection with hematuria, site unspecified  N39.0 cefdinir (OMNICEF) 300 MG capsule    R31.9    2. Dysuria  R30.0 UA reflex to Microscopic and Culture     Urine Microscopic   3. Nonspecific finding on examination of urine  R82.90 Urine Culture Aerobic Bacterial        Medical Decision Making:    Differential Diagnosis:  UTI: UTI, Pyelonephritis and Urethritis    Serious Comorbid Conditions:  Adult:  Malignancy    PLAN:  Discussed with patient and spouse that UA does show an infection. Given recent treatment with bactrim will treat with cefdinir twice a day for 10 days. Continue to push fluids. Follow up with urology if symptoms continue to reoccur. Education was added to AVS. Patient and spouse were agreeable to plan and verbalized understanding.     Followup:    If not improving in 5  days or if condition worsens, follow up with your Primary Care Provider    Patient Instructions   Cefdinir twice a day for 10 days  Push fluids

## 2020-08-05 LAB
BACTERIA SPEC CULT: ABNORMAL
SPECIMEN SOURCE: ABNORMAL

## 2020-08-18 ENCOUNTER — OFFICE VISIT (OUTPATIENT)
Dept: URGENT CARE | Facility: URGENT CARE | Age: 70
End: 2020-08-18
Payer: COMMERCIAL

## 2020-08-18 VITALS
TEMPERATURE: 96.2 F | SYSTOLIC BLOOD PRESSURE: 111 MMHG | OXYGEN SATURATION: 97 % | HEART RATE: 59 BPM | DIASTOLIC BLOOD PRESSURE: 71 MMHG

## 2020-08-18 DIAGNOSIS — R31.9 URINARY TRACT INFECTION WITH HEMATURIA, SITE UNSPECIFIED: Primary | ICD-10-CM

## 2020-08-18 DIAGNOSIS — R30.0 DYSURIA: ICD-10-CM

## 2020-08-18 DIAGNOSIS — N39.0 URINARY TRACT INFECTION WITH HEMATURIA, SITE UNSPECIFIED: Primary | ICD-10-CM

## 2020-08-18 DIAGNOSIS — R82.90 NONSPECIFIC FINDING ON EXAMINATION OF URINE: ICD-10-CM

## 2020-08-18 LAB
ALBUMIN UR-MCNC: 100 MG/DL
APPEARANCE UR: CLEAR
BACTERIA #/AREA URNS HPF: ABNORMAL /HPF
BILIRUB UR QL STRIP: NEGATIVE
COLOR UR AUTO: YELLOW
GLUCOSE UR STRIP-MCNC: NEGATIVE MG/DL
HGB UR QL STRIP: ABNORMAL
KETONES UR STRIP-MCNC: NEGATIVE MG/DL
LEUKOCYTE ESTERASE UR QL STRIP: ABNORMAL
NITRATE UR QL: NEGATIVE
NON-SQ EPI CELLS #/AREA URNS LPF: ABNORMAL /LPF
PH UR STRIP: 5.5 PH (ref 5–7)
RBC #/AREA URNS AUTO: ABNORMAL /HPF
SOURCE: ABNORMAL
SP GR UR STRIP: >1.03 (ref 1–1.03)
UROBILINOGEN UR STRIP-ACNC: 0.2 EU/DL (ref 0.2–1)
WBC #/AREA URNS AUTO: >100 /HPF

## 2020-08-18 PROCEDURE — 87088 URINE BACTERIA CULTURE: CPT | Performed by: NURSE PRACTITIONER

## 2020-08-18 PROCEDURE — 87086 URINE CULTURE/COLONY COUNT: CPT | Performed by: NURSE PRACTITIONER

## 2020-08-18 PROCEDURE — 87186 SC STD MICRODIL/AGAR DIL: CPT | Performed by: NURSE PRACTITIONER

## 2020-08-18 PROCEDURE — 99214 OFFICE O/P EST MOD 30 MIN: CPT | Performed by: NURSE PRACTITIONER

## 2020-08-18 PROCEDURE — 81001 URINALYSIS AUTO W/SCOPE: CPT | Performed by: NURSE PRACTITIONER

## 2020-08-18 RX ORDER — LEVOFLOXACIN 750 MG/1
750 TABLET, FILM COATED ORAL DAILY
Qty: 7 TABLET | Refills: 0 | Status: SHIPPED | OUTPATIENT
Start: 2020-08-18 | End: 2020-08-25

## 2020-08-18 ASSESSMENT — ENCOUNTER SYMPTOMS
FEVER: 0
FLANK PAIN: 0
ABDOMINAL PAIN: 0
HEMATURIA: 1
NAUSEA: 0
BACK PAIN: 0
DYSURIA: 0
VOMITING: 0

## 2020-08-18 NOTE — PROGRESS NOTES
SUBJECTIVE:   José Antonio Saha is a 70 year old male presenting with a chief complaint of   Chief Complaint   Patient presents with     Urgent Care     UTI     cloudy urine       He is an established patient of [facility].    UTI    Onset of symptoms was this morning  Course of illness is worsening  Severity moderate  Current and associated symptoms Cloudy urine, sediment in urine, blood in urine  Treatment and measures tried None  Predisposing factors include history of frequent UTI's  Patient denies rigors, flank pain, temperature > 101 degrees F. and vomiting    Patient has history of neurogenic bladder and does straight cath himself. Does follow with urology.  Patient was seen on 7/5/2020 in urgent care for UTI, was given bactrim twice a day for 10 days. Urine culture grew out E. Coli at that time and bactrim was sensitive. Patient then was also seen on 8/3/2020 for UTI symptoms. Was treated with cefdinir twice a day for 10 days. Urine culture grew out klebsiella pneumoniae and cefdinir was sensitive. States symptoms resolved with use of antibiotics and were gone for about 4 days before returning.        Review of Systems   Constitutional: Negative for fever.   Gastrointestinal: Negative for abdominal pain, nausea and vomiting.   Genitourinary: Positive for hematuria. Negative for discharge, dysuria, flank pain and penile swelling.   Musculoskeletal: Negative for back pain.   Skin: Negative for rash.       Past Medical History:   Diagnosis Date     Basal cell carcinoma      Malignant melanoma of other specified sites of skin 1980s    no recurrence     Personal history of other malignant neoplasm of skin 4/15/02    recurrent BCC scalp and face     Routine general medical examination at a health care facility 4/12/02     Scrotal varices     L scrotum     Family History   Problem Relation Age of Onset     Cancer Mother         thyroid and lung     Prostate Cancer Father 65        uncle also had prostate Ca      Cerebrovascular Disease Father      Hypertension Father      Arthritis Daughter      Skin Cancer Daughter      Breast Cancer Sister      Cancer - colorectal No family hx of      Diabetes No family hx of      Current Outpatient Medications   Medication Sig Dispense Refill     Acetaminophen (TYLENOL) 325 MG CAPS Take 325-650 mg by mouth as needed       baclofen (LIORESAL) 20 MG tablet Take 1 tablet (20 mg) by mouth 4 times daily 360 tablet 3     docusate sodium (ENEMEEZ) 283 MG enema Place 1 enema rectally daily 90 enema 3     Ethyl Alcohol, Skin Cleanser, 62 % LIQD Apply to hands as needed prior to self cath. 473 mL 1     gabapentin (NEURONTIN) 400 MG capsule Take 1 capsule (400 mg) by mouth 3 times daily 270 capsule 3     levofloxacin (LEVAQUIN) 750 MG tablet Take 1 tablet (750 mg) by mouth daily for 7 days 7 tablet 0     saline 0.65 % (Soln) SOLN Spray in nostril as needed       senna (SENOKOT) 8.6 MG tablet Take 1 tablet by mouth daily       sodium chloride (SHANNA 128) 5 % ophthalmic ointment 1 Application as needed for dry eyes       sulfamethoxazole-trimethoprim (BACTRIM DS) 800-160 MG tablet Take 1 tablet by mouth 2 times daily 20 tablet 0     tiZANidine (ZANAFLEX) 4 MG tablet Take 1 tablet (4 mg) by mouth 3 times daily 270 tablet 3     tolterodine (DETROL) 2 MG tablet Take 1 tablet (2 mg) by mouth 2 times daily 180 tablet 3     UNABLE TO FIND MEDICATION NAME: Guar Gum Powder       Vitamin D, Cholecalciferol, 25 MCG (1000 UT) CAPS        Social History     Tobacco Use     Smoking status: Never Smoker     Smokeless tobacco: Never Used   Substance Use Topics     Alcohol use: Yes     Alcohol/week: 0.0 standard drinks     Comment: 2  drinks per week       OBJECTIVE  /71   Pulse 59   Temp 96.2  F (35.7  C) (Tympanic)   SpO2 97%     Physical Exam  Vitals signs and nursing note reviewed.   Constitutional:       Comments: Sitting comfortably in wheelchair   Pulmonary:      Comments: No difficulty in breathing,  able to speak in full sentences without difficulty.  Neurological:      Mental Status: He is alert and oriented to person, place, and time.      GCS: GCS eye subscore is 4. GCS verbal subscore is 5. GCS motor subscore is 6.   Psychiatric:         Behavior: Behavior is cooperative.         Labs:  Results for orders placed or performed in visit on 08/18/20 (from the past 24 hour(s))   *UA reflex to Microscopic and Culture ([facility] (except [place] and [place])    Specimen: Midstream Urine   Result Value Ref Range    Color Urine Yellow     Appearance Urine Clear     Glucose Urine Negative NEG^Negative mg/dL    Bilirubin Urine Negative NEG^Negative    Ketones Urine Negative NEG^Negative mg/dL    Specific Gravity Urine >1.030 1.003 - 1.035    Blood Urine Large (A) NEG^Negative    pH Urine 5.5 5.0 - 7.0 pH    Protein Albumin Urine 100 (A) NEG^Negative mg/dL    Urobilinogen Urine 0.2 0.2 - 1.0 EU/dL    Nitrite Urine Negative NEG^Negative    Leukocyte Esterase Urine Moderate (A) NEG^Negative    Source Midstream Urine    Urine Microscopic   Result Value Ref Range    WBC Urine >100 (A) OTO5^0 - 5 /HPF    RBC Urine  (A) OTO2^O - 2 /HPF    Squamous Epithelial /LPF Urine Few FEW^Few /LPF    Bacteria Urine Few (A) NEG^Negative /HPF       ASSESSMENT:      ICD-10-CM    1. Urinary tract infection with hematuria, site unspecified  N39.0 levofloxacin (LEVAQUIN) 750 MG tablet    R31.9 Ethyl Alcohol, Skin Cleanser, 62 % LIQD   2. Dysuria  R30.0 *UA reflex to Microscopic and Culture ([facility] (except [place] and [place])     Urine Microscopic   3. Nonspecific finding on examination of urine  R82.90 Urine Culture Aerobic Bacterial        Medical Decision Making:    Differential Diagnosis:  UTI: UTI, Dysuria, Pyelonephritis and Urethritis    Serious Comorbid Conditions:  Adult:  Malignancy    PLAN:    Discussed with patient and spouse that UA does show infection. Given previous use of  bactrim and cefdinir with recurrently symptoms will treat with levaquin daily for 7 days. Recommend to push fluids. Can do tylenol for pain. Discussed cleaning technique to try and prevent recurrence of UTI. States usually does well will cleaning during the day but when he caths at night usually does it in bed so wondering if maybe not getting as clean. Tried to order a hand  that may have a pump so that he could place by his bed to see if that would help. Education was added to AVS. Patient and spouse was agreeable to plan and verbalized understanding. Keep visit with PCP as scheduled on 8/27/2020    Followup:    Keep follow up visit with PCP on 8/27/2020 as scheduled.    Patient Instructions   Levaquin daily for 7 days.   Push Fluids  Tylenol as needed for pain      Follow up with PCP or Urology regarding frequent recurrent UTIs.

## 2020-08-20 LAB
BACTERIA SPEC CULT: ABNORMAL
SPECIMEN SOURCE: ABNORMAL

## 2020-08-27 ENCOUNTER — VIRTUAL VISIT (OUTPATIENT)
Dept: FAMILY MEDICINE | Facility: CLINIC | Age: 70
End: 2020-08-27
Payer: COMMERCIAL

## 2020-08-27 DIAGNOSIS — Z87.828 H/O SPINAL CORD INJURY: Primary | ICD-10-CM

## 2020-08-27 DIAGNOSIS — N31.9 NEUROGENIC BLADDER: ICD-10-CM

## 2020-08-27 DIAGNOSIS — N39.0 RECURRENT UTI: ICD-10-CM

## 2020-08-27 PROCEDURE — 99213 OFFICE O/P EST LOW 20 MIN: CPT | Mod: TEL | Performed by: INTERNAL MEDICINE

## 2020-08-27 RX ORDER — SULFAMETHOXAZOLE/TRIMETHOPRIM 800-160 MG
TABLET ORAL
Qty: 40 TABLET | Refills: 2 | Status: SHIPPED | OUTPATIENT
Start: 2020-08-27 | End: 2020-10-09

## 2020-08-27 NOTE — PROGRESS NOTES
"José Antonio Saha is a 70 year old male who is being evaluated via a billable video visit.      The patient has been notified of following:     \"This video visit will be conducted via a call between you and your physician/provider. We have found that certain health care needs can be provided without the need for an in-person physical exam.  This service lets us provide the care you need with a video conversation.  If a prescription is necessary we can send it directly to your pharmacy.  If lab work is needed we can place an order for that and you can then stop by our lab to have the test done at a later time.    Video visits are billed at different rates depending on your insurance coverage.  Please reach out to your insurance provider with any questions.    If during the course of the call the physician/provider feels a video visit is not appropriate, you will not be charged for this service.\"    Patient has given verbal consent for Video visit? {YES-NO  Default Yes:4444::\"Yes\"}  How would you like to obtain your AVS? {AVS Preference:644566}  If you are dropped from the video visit, the video invite should be resent to: {video visit invitation:301689}  Will anyone else be joining your video visit? {:217297}  {If patient encounters technical issues they should call [phone] :225051}    Subjective     José Antonio Saha is a 70 year old male who presents today via video visit for the following health issues:    HPI    Genitourinary - Male (UTI follow up)  Onset/Duration: ***  Description:   Dysuria (painful urination): {.:335077::\"no\"}}  Hematuria (blood in urine): {.:074755::\"no\"}  Frequency: {.:307662::\"no\"}  Waking at night to urinate: {.:510780::\"no\"}  Hesitancy (delay in urine): {.:783868::\"no\"}  Retention (unable to empty): {.:445507::\"no\"}  Decrease in urinary flow: {.:379140::\"no\"}  Incontinence: {.:734318::\"no\"}  Progression of Symptoms:  {.:393384}  Accompanying Signs & Symptoms:  Fever: " "{.:822452::\"no\"}  Back/Flank pain: {.:766474::\"no\"}  Urethral discharge: {.:889444::\"no\"}  Testicle lumps/masses/pain: {.:569996::\"no\"}  Nausea and/or vomiting: {.:140571::\"no\"}  Abdominal pain: {.:159048::\"no\"}  History:   History of frequent UTI s: {.:028557::\"no\"}  History of kidney stones: {.:595322::\"no\"}  History of hernias: {.:807167::\"no\"}  Personal or Family history of Prostate problems: {.:061498::\"no\"}  Sexually active: {.:389428::\"no\"}  Precipitating or alleviating factors: {NONE DEFAULTED:807784::\"None\"}  Therapies tried and outcome: {URINARY TREATMENTS tried:289554::\"none\"}  {PEDS Chronic and Acute Problems (Optional):502770}     Video Start Time: {video visit start/end time for provider to select:616581}    {additonal problems for provider to add (Optional):563774}    Review of Systems   {ROS COMP (Optional):726288}      Objective           Vitals:  No vitals were obtained today due to virtual visit.    Physical Exam     {video visit exam brief selected:639115::\"GENERAL: Healthy, alert and no distress\",\"EYES: Eyes grossly normal to inspection.  No discharge or erythema, or obvious scleral/conjunctival abnormalities.\",\"RESP: No audible wheeze, cough, or visible cyanosis.  No visible retractions or increased work of breathing.  \",\"SKIN: Visible skin clear. No significant rash, abnormal pigmentation or lesions.\",\"NEURO: Cranial nerves grossly intact.  Mentation and speech appropriate for age.\",\"PSYCH: Mentation appears normal, affect normal/bright, judgement and insight intact, normal speech and appearance well-groomed.\"}      {Diagnostic Test Results (Optional):039135}        {PROVIDER CHARTING PREFERENCE:256424}      Video-Visit Details    Type of service:  Video Visit    Video End Time:{video visit start/end time for provider to select:318597}    Originating Location (pt. Location): {video visit patient location:473027::\"Home\"}    Distant Location (provider location):  [facility]" "    Platform used for Video Visit: {Virtual Visit Platforms:495159::\"Rebecca\"}        "

## 2020-08-27 NOTE — PROGRESS NOTES
"José Antonio Saha is a 70 year old male who is being evaluated via a billable telephone visit.      The patient has been notified of following:     \"This telephone visit will be conducted via a call between you and your physician/provider. We have found that certain health care needs can be provided without the need for a physical exam.  This service lets us provide the care you need with a short phone conversation.  If a prescription is necessary we can send it directly to your pharmacy.  If lab work is needed we can place an order for that and you can then stop by our lab to have the test done at a later time.    Telephone visits are billed at different rates depending on your insurance coverage. During this emergency period, for some insurers they may be billed the same as an in-person visit.  Please reach out to your insurance provider with any questions.    If during the course of the call the physician/provider feels a telephone visit is not appropriate, you will not be charged for this service.\"    Patient has given verbal consent for Telephone visit?  Yes    What phone number would you like to be contacted at? [phone]    How would you like to obtain your AVS? MyChart    Subjective     José Antonio Saha is a 70 year old male who presents via phone visit today for the following health issues:    HPI    Concern - Follow up UTI     Description: UTI symptoms improved   Patient not experiencing any symptoms at this time.    - Has had 4 UTI in a year would like to discuss options.   -Wondering if he should continue with an antibiotic of symptoms come back.     2 X E. Coli UTIs  2X Klebsiella  All bactrim sensitive      Inquiring about daily suppressive antibiotics   Or home prescription for starting when he has sign/symptoms of UTI  He has never had physical symptoms, he notices a change in urine color, and sediment/turbidity in all 4 cases       Review of Systems          Objective          Vitals:  No vitals " were obtained today due to virtual visit.    healthy, alert and no distress  PSYCH: Alert and oriented times 3; coherent speech, normal   rate and volume, able to articulate logical thoughts, able   to abstract reason, no tangential thoughts, no hallucinations   or delusions  His affect is normal  RESP: No cough, no audible wheezing, able to talk in full sentences  Remainder of exam unable to be completed due to telephone visits    Urine culture date reviewed in Epic         Assessment/Plan:    Assessment & Plan     José Antonio was seen today for uti.    Diagnoses and all orders for this visit:    H/O spinal cord injury    Neurogenic bladder    Recurrent UTI  -     sulfamethoxazole-trimethoprim (BACTRIM DS) 800-160 MG tablet; Take one tablet twice daily for 10 days at first sign of urinary tract infection  -     Urine Culture Aerobic Bacterial; Future      Thorough discussion about options to help with recurrent infections associated with neurogenic bladder and catheter use  Decided against daily prophylactic therapy due to concerns for selecting for resistant organisms long-term  Decided to use bactrim to take as needed at first sign of infection after collecting a sample in sterile urine container and placing in fridge to bring into clinic for formal culture            Return in about 3 months (around 11/27/2020) for recheck.  Patient instructed to return to clinic or contact us sooner if symptoms worsen or new symptoms develop.     Kirk Chavez MD  [facility]    Phone call duration:  15 minutes

## 2020-09-13 PROCEDURE — 87086 URINE CULTURE/COLONY COUNT: CPT | Performed by: INTERNAL MEDICINE

## 2020-09-13 PROCEDURE — 87186 SC STD MICRODIL/AGAR DIL: CPT | Performed by: INTERNAL MEDICINE

## 2020-09-13 PROCEDURE — 87088 URINE BACTERIA CULTURE: CPT | Performed by: INTERNAL MEDICINE

## 2020-09-14 DIAGNOSIS — N39.0 RECURRENT UTI: ICD-10-CM

## 2020-09-14 NOTE — LETTER
September 16, 2020      José Antonio Saha  [address]  [place] [address]        Dear ,    The following letter pertains to your most recent diagnostic tests:     Good news! The E. Coli that grew in your urine culture should respond to the Bactrim previously prescribed.  Please complete a 10 day course.       Resulted Orders   Urine Culture Aerobic Bacterial   Result Value Ref Range    Specimen Description Midstream Urine     Special Requests Midstream Urine     Culture Micro 50,000 to 100,000 colonies/mL  Escherichia coli   (A)        Sincerely,     Dr. Chavez/em

## 2020-09-15 LAB
BACTERIA SPEC CULT: ABNORMAL
Lab: ABNORMAL
SPECIMEN SOURCE: ABNORMAL

## 2020-10-09 ENCOUNTER — APPOINTMENT (OUTPATIENT)
Dept: LAB | Facility: CLINIC | Age: 70
End: 2020-10-09
Payer: MEDICARE

## 2020-10-09 ENCOUNTER — DOCUMENTATION ONLY (OUTPATIENT)
Dept: FAMILY MEDICINE | Facility: CLINIC | Age: 70
End: 2020-10-09
Payer: COMMERCIAL

## 2020-10-09 DIAGNOSIS — N39.0 RECURRENT UTI: Primary | ICD-10-CM

## 2020-10-09 LAB
ALBUMIN UR-MCNC: 30 MG/DL
AMORPH CRY #/AREA URNS HPF: ABNORMAL /HPF
APPEARANCE UR: ABNORMAL
BACTERIA #/AREA URNS HPF: ABNORMAL /HPF
BILIRUB UR QL STRIP: NEGATIVE
COLOR UR AUTO: YELLOW
GLUCOSE UR STRIP-MCNC: NEGATIVE MG/DL
HGB UR QL STRIP: ABNORMAL
KETONES UR STRIP-MCNC: ABNORMAL MG/DL
LEUKOCYTE ESTERASE UR QL STRIP: ABNORMAL
NITRATE UR QL: NEGATIVE
NON-SQ EPI CELLS #/AREA URNS LPF: ABNORMAL /LPF
PH UR STRIP: 5.5 PH (ref 5–7)
RBC #/AREA URNS AUTO: ABNORMAL /HPF
SOURCE: ABNORMAL
SP GR UR STRIP: >1.03 (ref 1–1.03)
UROBILINOGEN UR STRIP-ACNC: 0.2 EU/DL (ref 0.2–1)
WBC #/AREA URNS AUTO: ABNORMAL /HPF

## 2020-10-09 PROCEDURE — 87186 SC STD MICRODIL/AGAR DIL: CPT | Performed by: INTERNAL MEDICINE

## 2020-10-09 PROCEDURE — 81001 URINALYSIS AUTO W/SCOPE: CPT | Performed by: INTERNAL MEDICINE

## 2020-10-09 PROCEDURE — 87086 URINE CULTURE/COLONY COUNT: CPT | Performed by: INTERNAL MEDICINE

## 2020-10-09 PROCEDURE — 87088 URINE BACTERIA CULTURE: CPT | Performed by: INTERNAL MEDICINE

## 2020-10-09 RX ORDER — SULFAMETHOXAZOLE/TRIMETHOPRIM 800-160 MG
TABLET ORAL
Qty: 40 TABLET | Refills: 2 | Status: SHIPPED | OUTPATIENT
Start: 2020-10-09 | End: 2021-04-19

## 2020-10-09 NOTE — RESULT ENCOUNTER NOTE
The following letter pertains to your most recent diagnostic tests:    The UA suggests another infection.      I would start the bactrim and we can follow up on the urine culture result when it is available to further guide therapy.    Since you have had a number of infections, I sent another prescription for the bactrim tablets to your pharmacy.        Sincerely,    Dr. Chavez

## 2020-10-12 RX ORDER — AMOXICILLIN 875 MG
875 TABLET ORAL 2 TIMES DAILY
Qty: 20 TABLET | Refills: 0 | Status: SHIPPED | OUTPATIENT
Start: 2020-10-12 | End: 2020-10-30

## 2020-10-12 NOTE — RESULT ENCOUNTER NOTE
Can you call Dr. Neff and make sure he checks my [employer] message and switches from bactrim to the amoxicillin prescription sent to her pharmacy

## 2020-10-12 NOTE — RESULT ENCOUNTER NOTE
The following letter pertains to your most recent diagnostic tests:    The urine culture grew Enterococcus which will  not respond to the bactrim used for empiric treatment.    I recommend stopping bactrim and starting amoxicillin.    I sent an prescription for amoxicillin to your pharmacy.              Follow up:  Call if symptoms persist despite completing amoxicillin course or sooner if symptoms worsen despite changing antibiotics.        Sincerely,    Dr. Chavez

## 2020-10-14 LAB
BACTERIA SPEC CULT: ABNORMAL
BACTERIA SPEC CULT: ABNORMAL
Lab: ABNORMAL
SPECIMEN SOURCE: ABNORMAL

## 2020-10-28 ENCOUNTER — DOCUMENTATION ONLY (OUTPATIENT)
Dept: FAMILY MEDICINE | Facility: CLINIC | Age: 70
End: 2020-10-28

## 2020-10-28 ENCOUNTER — TELEPHONE (OUTPATIENT)
Dept: FAMILY MEDICINE | Facility: CLINIC | Age: 70
End: 2020-10-28

## 2020-10-28 DIAGNOSIS — N31.9 NEUROGENIC BLADDER: ICD-10-CM

## 2020-10-28 DIAGNOSIS — N39.0 RECURRENT UTI: Primary | ICD-10-CM

## 2020-10-28 DIAGNOSIS — N39.0 RECURRENT UTI: ICD-10-CM

## 2020-10-28 PROCEDURE — 87088 URINE BACTERIA CULTURE: CPT | Performed by: INTERNAL MEDICINE

## 2020-10-28 PROCEDURE — 87086 URINE CULTURE/COLONY COUNT: CPT | Performed by: INTERNAL MEDICINE

## 2020-10-28 PROCEDURE — 87186 SC STD MICRODIL/AGAR DIL: CPT | Performed by: INTERNAL MEDICINE

## 2020-10-28 PROCEDURE — 81001 URINALYSIS AUTO W/SCOPE: CPT | Performed by: INTERNAL MEDICINE

## 2020-10-28 PROCEDURE — 36415 COLL VENOUS BLD VENIPUNCTURE: CPT | Performed by: INTERNAL MEDICINE

## 2020-10-28 NOTE — TELEPHONE ENCOUNTER
, see patient message below.     Given patient's history of UTI, I placed a urine culture order.    If any issues, you can find me at the clinic tomorrow to discuss; I didn't want the specimen to get wasted, due to his history. There were no providers in office to consult about this.    Thank you,  Holly Perez RN on 10/28/2020 at 5:44 PM

## 2020-10-28 NOTE — TELEPHONE ENCOUNTER
Reason for Call:  Other prescription    Detailed comments: Pt states that he dropped of a urine sample on 10.28.2020 and will be starting bactrim for a self-diagnosed UTI. If the culture comes back stating that this is not the correct Rx to treat it, Pt is requesting a new Rx for treatment to be sent to:  [id number] - [place], [place] - [address] [id number] -     Phone Number Patient can be reached at: Cell number on file:    Telephone Information:   Mobile [phone]     Best Time: any    Can we leave a detailed message on this number? YES    Call taken on 10/28/2020 at 5:24 PM by Corry Garvin

## 2020-10-29 LAB
ALBUMIN UR-MCNC: NEGATIVE MG/DL
APPEARANCE UR: ABNORMAL
BACTERIA #/AREA URNS HPF: ABNORMAL /HPF
BILIRUB UR QL STRIP: NEGATIVE
COLOR UR AUTO: YELLOW
GLUCOSE UR STRIP-MCNC: NEGATIVE MG/DL
HGB UR QL STRIP: ABNORMAL
KETONES UR STRIP-MCNC: NEGATIVE MG/DL
LEUKOCYTE ESTERASE UR QL STRIP: ABNORMAL
NITRATE UR QL: NEGATIVE
PH UR STRIP: 6 PH (ref 5–7)
RBC #/AREA URNS AUTO: ABNORMAL /HPF
SOURCE: ABNORMAL
SP GR UR STRIP: >1.03 (ref 1–1.03)
UROBILINOGEN UR STRIP-ACNC: 0.2 EU/DL (ref 0.2–1)
WBC #/AREA URNS AUTO: >100 /HPF

## 2020-10-29 NOTE — TELEPHONE ENCOUNTER
Placed STANDING order urine culture per  request. (interestingly, I ordered only a urine culture last evening, but a UMAC was run anyway.  No harm in this, but in the future, is just a culture sufficient? (no need to route back unless you want a standing Ua/rishabh in addition to culture).     Thanks,  Holly Perez RN on 10/29/2020 at 11:45 AM

## 2020-10-30 ENCOUNTER — MYC MEDICAL ADVICE (OUTPATIENT)
Dept: FAMILY MEDICINE | Facility: CLINIC | Age: 70
End: 2020-10-30

## 2020-10-30 DIAGNOSIS — N39.0 RECURRENT UTI: ICD-10-CM

## 2020-10-30 RX ORDER — AMOXICILLIN 875 MG
875 TABLET ORAL 2 TIMES DAILY
Qty: 20 TABLET | Refills: 0 | Status: SHIPPED | OUTPATIENT
Start: 2020-10-30 | End: 2020-11-19

## 2020-10-30 NOTE — TELEPHONE ENCOUNTER
To PCP:     Please see message. I do not see that Culture is completed from sample on 10/28, but pharmacy is pended, if needed.     Thank you,   Rosaline WEAVER RN

## 2020-10-31 LAB
BACTERIA SPEC CULT: ABNORMAL
Lab: ABNORMAL
SPECIMEN SOURCE: ABNORMAL

## 2020-10-31 NOTE — RESULT ENCOUNTER NOTE
The following letter pertains to your most recent diagnostic tests:    I got the culture report.  I sent the amoxicillin to your pharmacy.  It should work.        Sincerely,    Dr. Chavez

## 2020-11-18 ASSESSMENT — ACTIVITIES OF DAILY LIVING (ADL)
CURRENT_FUNCTION: LAUNDRY REQUIRES ASSISTANCE
CURRENT_FUNCTION: BATHING REQUIRES ASSISTANCE
CURRENT_FUNCTION: SHOPPING REQUIRES ASSISTANCE
CURRENT_FUNCTION: TRANSPORTATION REQUIRES ASSISTANCE
CURRENT_FUNCTION: PREPARING MEALS REQUIRES ASSISTANCE
CURRENT_FUNCTION: HOUSEWORK REQUIRES ASSISTANCE

## 2020-11-19 ENCOUNTER — OFFICE VISIT (OUTPATIENT)
Dept: FAMILY MEDICINE | Facility: CLINIC | Age: 70
End: 2020-11-19
Payer: COMMERCIAL

## 2020-11-19 VITALS
DIASTOLIC BLOOD PRESSURE: 64 MMHG | TEMPERATURE: 97.7 F | SYSTOLIC BLOOD PRESSURE: 97 MMHG | BODY MASS INDEX: 21.67 KG/M2 | WEIGHT: 160 LBS | OXYGEN SATURATION: 94 % | HEIGHT: 72 IN | HEART RATE: 66 BPM

## 2020-11-19 DIAGNOSIS — R97.20 ELEVATED PROSTATE SPECIFIC ANTIGEN (PSA): ICD-10-CM

## 2020-11-19 DIAGNOSIS — Z00.00 ROUTINE GENERAL MEDICAL EXAMINATION AT A HEALTH CARE FACILITY: Primary | ICD-10-CM

## 2020-11-19 DIAGNOSIS — N39.0 RECURRENT UTI: ICD-10-CM

## 2020-11-19 LAB
ERYTHROCYTE [DISTWIDTH] IN BLOOD BY AUTOMATED COUNT: 13.5 % (ref 10–15)
HCT VFR BLD AUTO: 41.9 % (ref 40–53)
HGB BLD-MCNC: 14.5 G/DL (ref 13.3–17.7)
MCH RBC QN AUTO: 32.9 PG (ref 26.5–33)
MCHC RBC AUTO-ENTMCNC: 34.6 G/DL (ref 31.5–36.5)
MCV RBC AUTO: 95 FL (ref 78–100)
PLATELET # BLD AUTO: 283 10E9/L (ref 150–450)
RBC # BLD AUTO: 4.41 10E12/L (ref 4.4–5.9)
WBC # BLD AUTO: 7.1 10E9/L (ref 4–11)

## 2020-11-19 PROCEDURE — 85027 COMPLETE CBC AUTOMATED: CPT | Performed by: INTERNAL MEDICINE

## 2020-11-19 PROCEDURE — 99397 PER PM REEVAL EST PAT 65+ YR: CPT | Performed by: INTERNAL MEDICINE

## 2020-11-19 PROCEDURE — 36415 COLL VENOUS BLD VENIPUNCTURE: CPT | Performed by: INTERNAL MEDICINE

## 2020-11-19 PROCEDURE — 84153 ASSAY OF PSA TOTAL: CPT | Performed by: INTERNAL MEDICINE

## 2020-11-19 PROCEDURE — 80053 COMPREHEN METABOLIC PANEL: CPT | Performed by: INTERNAL MEDICINE

## 2020-11-19 PROCEDURE — 80061 LIPID PANEL: CPT | Performed by: INTERNAL MEDICINE

## 2020-11-19 RX ORDER — AMOXICILLIN 875 MG
875 TABLET ORAL 2 TIMES DAILY
Qty: 20 TABLET | Refills: 3 | Status: SHIPPED | OUTPATIENT
Start: 2020-11-19 | End: 2021-04-19

## 2020-11-19 ASSESSMENT — ACTIVITIES OF DAILY LIVING (ADL)
CURRENT_FUNCTION: BATHING REQUIRES ASSISTANCE
CURRENT_FUNCTION: SHOPPING REQUIRES ASSISTANCE
CURRENT_FUNCTION: LAUNDRY REQUIRES ASSISTANCE
CURRENT_FUNCTION: TRANSPORTATION REQUIRES ASSISTANCE
CURRENT_FUNCTION: PREPARING MEALS REQUIRES ASSISTANCE
CURRENT_FUNCTION: HOUSEWORK REQUIRES ASSISTANCE

## 2020-11-19 ASSESSMENT — MIFFLIN-ST. JEOR: SCORE: 1533.76

## 2020-11-19 NOTE — PROGRESS NOTES
"SUBJECTIVE:   José Antonio Saha is a 70 year old male who presents for Preventive Visit.      Patient has been advised of split billing requirements and indicates understanding: Yes   Are you in the first 12 months of your Medicare coverage?  No    Healthy Habits:     In general, how would you rate your overall health?  Fair    Frequency of exercise:  6-7 days/week    Duration of exercise:  30-45 minutes    Do you usually eat at least 4 servings of fruit and vegetables a day, include whole grains    & fiber and avoid regularly eating high fat or \"junk\" foods?  Yes    Taking medications regularly:  Yes    Medication side effects:  Other    Ability to successfully perform activities of daily living:  Transportation requires assistance, shopping requires assistance, preparing meals requires assistance, housework requires assistance, bathing requires assistance and laundry requires assistance    Home Safety:  No safety concerns identified    Hearing Impairment:  No hearing concerns    In the past 6 months, have you been bothered by leaking of urine? Yes    In general, how would you rate your overall mental or emotional health?  Fair      PHQ-2 Total Score: 2    Additional concerns today:  Yes    Do you feel safe in your environment? Yes    Have you ever done Advance Care Planning? (For example, a Health Directive, POLST, or a discussion with a medical provider or your loved ones about your wishes): No, advance care planning information given to patient to review.  Advanced care planning was discussed at today's visit.      Fall risk  Fallen 2 or more times in the past year?: Yes  Any fall with injury in the past year?: No        Do you have sleep apnea, excessive snoring or daytime drowsiness?: no    Reviewed and updated as needed this visit by clinical staff  Tobacco  Allergies               Reviewed and updated as needed this visit by Provider                Social History     Tobacco Use     Smoking status: Never " Smoker     Smokeless tobacco: Never Used   Substance Use Topics     Alcohol use: Yes     Alcohol/week: 0.0 standard drinks     Comment: 2  drinks per week     If you drink alcohol do you typically have >3 drinks per day or >7 drinks per week? No    Alcohol Use 11/19/2020   Prescreen: >3 drinks/day or >7 drinks/week? -   Prescreen: >3 drinks/day or >7 drinks/week? No               Current providers sharing in care for this patient include:   Patient Care Team:  Kirk Chavez MD as PCP - General (Internal Medicine)  Kirk Chavez MD as Assigned PCP    The following health maintenance items are reviewed in Epic and correct as of today:  Health Maintenance   Topic Date Due     AORTIC ANEURYSM SCREENING (SYSTEM ASSIGNED)  01/01/2017     ZOSTER IMMUNIZATION (2 of 2) 07/03/2018     Pneumococcal Vaccine: 65+ Years (2 of 2 - PPSV23) 05/03/2019     INFLUENZA VACCINE (1) 09/01/2020     FALL RISK ASSESSMENT  06/02/2021     COLORECTAL CANCER SCREENING  09/29/2021     MEDICARE ANNUAL WELLNESS VISIT  11/19/2021     ADVANCE CARE PLANNING  11/19/2025     DTAP/TDAP/TD IMMUNIZATION (4 - Td) 06/11/2029     HEPATITIS C SCREENING  Completed     PHQ-2  Completed     Pneumococcal Vaccine: Pediatrics (0 to 5 Years) and At-Risk Patients (6 to 64 Years)  Aged Out     IPV IMMUNIZATION  Aged Out     MENINGITIS IMMUNIZATION  Aged Out     HEPATITIS B IMMUNIZATION  Aged Out     Patient Active Problem List   Diagnosis     Special screening for malignant neoplasm of prostate     CARDIOVASCULAR SCREENING; LDL GOAL LESS THAN 160     Basal cell carcinoma of skin of trunk     Basal cell carcinoma of nose     Sessile colonic polyp     Right shoulder pain, unspecified chronicity     Cervical segment dysfunction     Right supraspinatus tendinitis     Thoracic segment dysfunction     Past Surgical History:   Procedure Laterality Date     COLONOSCOPY  11/19/2013    Procedure: COMBINED COLONOSCOPY, SINGLE BIOPSY/POLYPECTOMY BY BIOPSY;  COLONOSCOPY;   Surgeon: Adam Lopez MD;  Location:  GI     COLONOSCOPY N/A 9/29/2016    Procedure: COLONOSCOPY;  Surgeon: Paul Ken MD;  Location:  GI     [facility] NONSPECIFIC PROCEDURE  6/27/02    colonoscopy with polypectomy x3, fulguration x1     [facility] NONSPECIFIC PROCEDURE      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     [facility] NONSPECIFIC PROCEDURE      multiple skin Bxs     [facility] NONSPECIFIC PROCEDURE      tonsillectomy       Social History     Tobacco Use     Smoking status: Never Smoker     Smokeless tobacco: Never Used   Substance Use Topics     Alcohol use: Yes     Alcohol/week: 0.0 standard drinks     Comment: 2  drinks per week     Family History   Problem Relation Age of Onset     Cancer Mother         thyroid and lung     Prostate Cancer Father 65        uncle also had prostate Ca     Cerebrovascular Disease Father      Hypertension Father      Arthritis Daughter      Skin Cancer Daughter      Breast Cancer Sister      Cancer - colorectal No family hx of      Diabetes No family hx of          Current Outpatient Medications   Medication Sig Dispense Refill     Acetaminophen (TYLENOL) 325 MG CAPS Take 325-650 mg by mouth as needed       amoxicillin (AMOXIL) 875 MG tablet Take 1 tablet (875 mg) by mouth 2 times daily (Patient taking differently: Take 875 mg by mouth 2 times daily as needed ) 20 tablet 0     baclofen (LIORESAL) 20 MG tablet Take 1 tablet (20 mg) by mouth 4 times daily 360 tablet 3     docusate sodium (ENEMEEZ) 283 MG enema Place 1 enema rectally daily 90 enema 3     Ethyl Alcohol, Skin Cleanser, 62 % LIQD Apply to hands as needed prior to self cath. 473 mL 1     gabapentin (NEURONTIN) 400 MG capsule Take 1 capsule (400 mg) by mouth 3 times daily 270 capsule 3     saline 0.65 % (Soln) SOLN Spray in nostril as needed       senna (SENOKOT) 8.6 MG tablet Take 1 tablet by mouth 3 times daily        sodium chloride (SHANNA 128) 5 % ophthalmic ointment 1 Application as needed for dry eyes        sulfamethoxazole-trimethoprim (BACTRIM DS) 800-160 MG tablet Take one tablet twice daily for 10 days at first sign of urinary tract infection 40 tablet 2     sulfamethoxazole-trimethoprim (BACTRIM DS) 800-160 MG tablet Take 1 tablet by mouth 2 times daily 20 tablet 0     tiZANidine (ZANAFLEX) 4 MG tablet Take 1 tablet (4 mg) by mouth 3 times daily 270 tablet 3     tolterodine (DETROL) 2 MG tablet Take 1 tablet (2 mg) by mouth 2 times daily (Patient taking differently: Take 2 mg by mouth daily ) 180 tablet 3     UNABLE TO FIND MEDICATION NAME: Guar Gum Powder       Vitamin D, Cholecalciferol, 25 MCG (1000 UT) CAPS Take by mouth daily        Allergies   Allergen Reactions     No Known Drug Allergies          Review of Systems  Constitutional, HEENT, cardiovascular, pulmonary, gi and gu systems are negative, except as otherwise noted.    OBJECTIVE:   BP 97/64 (BP Location: Left arm, Cuff Size: Adult Regular)   Pulse 66   Temp 97.7  F (36.5  C) (Temporal)   Ht 1.829 m (6')   Wt 72.6 kg (160 lb)   SpO2 94%   BMI 21.70 kg/m   Estimated body mass index is 21.7 kg/m  as calculated from the following:    Height as of this encounter: 1.829 m (6').    Weight as of this encounter: 72.6 kg (160 lb).  Physical Exam  General: This is a well-appearing man in no acute distress.  He is in a mechanical wheelchair.  HEENT: He was wearing a mask due to Covid, the bilateral tympanic membranes appear normal.  Cardiovascular: The heart has a regular rate and rhythm.  Pulmonary: The lungs are clear to auscultation bilaterally, breathing is not labored.  Abdomen: Soft, not distended, not tender, bowel sounds present, no masses, no organomegaly.  Extremities: He is wearing bilateral AFO braces, there is no significant edema.  Neurological: Alert and oriented to person place and time, cranial nerves II to XII are grossly intact, there is quadriplegia resulting from his cervical spine injury.  Mental status: Appropriate mood and affect,  well-groomed, normal speech.    Labs pending    ASSESSMENT / PLAN:   1. Routine general medical examination at a health care facility    - Comprehensive metabolic panel  - CBC with platelets  - Lipid panel reflex to direct LDL Fasting    2. Elevated prostate specific antigen (PSA)    - PSA tumor marker    Patient has been advised of split billing requirements and indicates understanding: Yes  COUNSELING:  Reviewed preventive health counseling, as reflected in patient instructions  Special attention given to:       Consider AAA screening for ages 65-75 and smoking history; we discussed screening today, since he has few risk factors, we decided to delay screening until after the Covid epidemic has improved       Regular exercise       Healthy diet/nutrition       Immunizations    Recommended completing the Shingrix series, he declined my recommendation for a 23 valent pneumococcal vaccine today because his friend got Guillain-Barré syndrome from this vaccine           Consider lung cancer screening for ages 55-80 years and 30 pack-year smoking history ; never smoker       Colon cancer screening; he will be due for colonoscopy in 2021       Prostate cancer screening; recheck prostate-specific antigen tumor marker, consider returning to Dr. Ayala pending result    Estimated body mass index is 21.7 kg/m  as calculated from the following:    Height as of this encounter: 1.829 m (6').    Weight as of this encounter: 72.6 kg (160 lb).        He reports that he has never smoked. He has never used smokeless tobacco.      Appropriate preventive services were discussed with this patient, including applicable screening as appropriate for cardiovascular disease, diabetes, osteopenia/osteoporosis, and glaucoma.  As appropriate for age/gender, discussed screening for colorectal cancer, prostate cancer, breast cancer, and cervical cancer. Checklist reviewing preventive services available has been given to the patient.    Reviewed  patients plan of care and provided an AVS. The Basic Care Plan (routine screening as documented in Health Maintenance) for José Antonio meets the Care Plan requirement. This Care Plan has been established and reviewed with the Patient.    Counseling Resources:  ATP IV Guidelines  Pooled Cohorts Equation Calculator  Breast Cancer Risk Calculator  Breast Cancer: Medication to Reduce Risk  FRAX Risk Assessment  ICSI Preventive Guidelines  Dietary Guidelines for Americans, 2010  [employer]'s MyPlate  ASA Prophylaxis  Lung CA Screening    Kirk Chavez MD  [facility]    Identified Health Risks:

## 2020-11-20 LAB
ALBUMIN SERPL-MCNC: 3.7 G/DL (ref 3.4–5)
ALP SERPL-CCNC: 41 U/L (ref 40–150)
ALT SERPL W P-5'-P-CCNC: 18 U/L (ref 0–70)
ANION GAP SERPL CALCULATED.3IONS-SCNC: 7 MMOL/L (ref 3–14)
AST SERPL W P-5'-P-CCNC: 14 U/L (ref 0–45)
BILIRUB SERPL-MCNC: 0.3 MG/DL (ref 0.2–1.3)
BUN SERPL-MCNC: 15 MG/DL (ref 7–30)
CALCIUM SERPL-MCNC: 9 MG/DL (ref 8.5–10.1)
CHLORIDE SERPL-SCNC: 106 MMOL/L (ref 94–109)
CHOLEST SERPL-MCNC: 177 MG/DL
CO2 SERPL-SCNC: 24 MMOL/L (ref 20–32)
CREAT SERPL-MCNC: 0.71 MG/DL (ref 0.66–1.25)
GFR SERPL CREATININE-BSD FRML MDRD: >90 ML/MIN/{1.73_M2}
GLUCOSE SERPL-MCNC: 98 MG/DL (ref 70–99)
HDLC SERPL-MCNC: 52 MG/DL
LDLC SERPL CALC-MCNC: 94 MG/DL
NONHDLC SERPL-MCNC: 125 MG/DL
POTASSIUM SERPL-SCNC: 4.4 MMOL/L (ref 3.4–5.3)
PROT SERPL-MCNC: 7.1 G/DL (ref 6.8–8.8)
PSA SERPL-MCNC: 11 UG/L (ref 0–4)
SODIUM SERPL-SCNC: 137 MMOL/L (ref 133–144)
TRIGL SERPL-MCNC: 155 MG/DL

## 2020-11-21 NOTE — RESULT ENCOUNTER NOTE
The following letter pertains to your most recent diagnostic tests:    -Your cholesterol panel looks healthy.     -Liver and gallbladder tests are normal for you. (ALT,AST, Alk phos, bilirubin), kidney function is normal for you (Creatinine, GFR), Sodium is normal, Potassium is normal for you, Calcium is normal for you, Glucose (blood sugar) is normal for you.      -The PSA has crept up a bit.      -Your complete blood counts including your hemoglobin returned normal for you.       Bottom line:  These results look OK with the exception of the rise in PSA.  I suggest rechecking the PSA in 4-6 weeks.  If the PSA increased further or fails to improve back to the base line level of around 6 to 7, the I think you should check in with Dr. Ayala to see what he might recommend as a next step.  Sometimes, UTI or recent UTI can falsely elevated PSA, so I think it is work rechecking.         Follow up:  Lab appointment in 4-6 weeks to trend out PSA       Sincerely,    Dr. Chavez

## 2020-11-22 PROCEDURE — 87186 SC STD MICRODIL/AGAR DIL: CPT | Performed by: INTERNAL MEDICINE

## 2020-11-22 PROCEDURE — 87088 URINE BACTERIA CULTURE: CPT | Performed by: INTERNAL MEDICINE

## 2020-11-22 PROCEDURE — 87086 URINE CULTURE/COLONY COUNT: CPT | Performed by: INTERNAL MEDICINE

## 2020-11-23 DIAGNOSIS — N31.9 NEUROGENIC BLADDER: ICD-10-CM

## 2020-11-23 DIAGNOSIS — N39.0 RECURRENT UTI: ICD-10-CM

## 2020-11-24 LAB
BACTERIA SPEC CULT: ABNORMAL
Lab: ABNORMAL
SPECIMEN SOURCE: ABNORMAL

## 2020-11-25 NOTE — RESULT ENCOUNTER NOTE
The following letter pertains to your most recent diagnostic tests:    The bactrim should work well for this infection.  Since the CFU count is borderline, depending on your symptoms, you should start and complete a course of bactrim.        Sincerely,    Dr. Chavez

## 2020-12-07 DIAGNOSIS — Z87.828 H/O SPINAL CORD INJURY: ICD-10-CM

## 2020-12-09 DIAGNOSIS — Z87.828 H/O SPINAL CORD INJURY: ICD-10-CM

## 2020-12-09 RX ORDER — BACLOFEN 20 MG/1
20 TABLET ORAL 4 TIMES DAILY
Qty: 360 TABLET | Refills: 3 | Status: SHIPPED | OUTPATIENT
Start: 2020-12-09 | End: 2021-06-03

## 2020-12-09 RX ORDER — BACLOFEN 20 MG/1
20 TABLET ORAL 4 TIMES DAILY
Qty: 360 TABLET | Refills: 3 | Status: CANCELLED | OUTPATIENT
Start: 2020-12-09

## 2020-12-10 NOTE — TELEPHONE ENCOUNTER
baclofen (LIORESAL) 20 MG tablet 360 tablet 3 12/9/2020           Last Written Prescription Date:  12/9/2020  Last Fill Quantity: 360,   # refills: 3  Last Office Visit: 11/19/2020  Future Office visit:    Next 5 appointments (look out 90 days)    Dec 16, 2020 11:00 AM  Pre-Op physical with Kirk Cahvez MD  [facility] ([facility]) [address] [address]  [phone]           Routing refill request to provider for review/approval because:  Drug not on the [facility], P or [facility] refill protocol or controlled substance

## 2020-12-29 DIAGNOSIS — N31.9 NEUROGENIC BLADDER: ICD-10-CM

## 2020-12-29 DIAGNOSIS — N39.0 RECURRENT UTI: ICD-10-CM

## 2020-12-29 PROCEDURE — 87086 URINE CULTURE/COLONY COUNT: CPT | Performed by: INTERNAL MEDICINE

## 2020-12-31 LAB
BACTERIA SPEC CULT: NO GROWTH
Lab: NORMAL
SPECIMEN SOURCE: NORMAL

## 2021-01-06 DIAGNOSIS — N31.9 NEUROGENIC BLADDER: ICD-10-CM

## 2021-01-06 DIAGNOSIS — N39.0 RECURRENT UTI: ICD-10-CM

## 2021-01-06 PROCEDURE — 87088 URINE BACTERIA CULTURE: CPT | Performed by: INTERNAL MEDICINE

## 2021-01-06 PROCEDURE — 87186 SC STD MICRODIL/AGAR DIL: CPT | Performed by: INTERNAL MEDICINE

## 2021-01-06 PROCEDURE — 87086 URINE CULTURE/COLONY COUNT: CPT | Performed by: INTERNAL MEDICINE

## 2021-01-06 NOTE — LETTER
January 8, 2021      José Antonio Saha  [address]  [place] [address]        Dear ,    The following letter pertains to your most recent diagnostic tests:     Urine culture shows E. Coli that should respond to amoxicillin or bactrim.         Resulted Orders   Urine Culture Aerobic Bacterial   Result Value Ref Range    Specimen Description Midstream Urine     Special Requests Specimen received in preservative     Culture Micro >100,000 colonies/mL  Escherichia coli   (A)        Sincerely,     Dr. Chavez

## 2021-01-08 LAB
BACTERIA SPEC CULT: ABNORMAL
Lab: ABNORMAL
SPECIMEN SOURCE: ABNORMAL

## 2021-01-08 NOTE — RESULT ENCOUNTER NOTE
The following letter pertains to your most recent diagnostic tests:    Urine culture shows E. Coli that should respond to amoxicillin or bactrim.         Sincerely,    Dr. Chavez

## 2021-01-15 ENCOUNTER — HEALTH MAINTENANCE LETTER (OUTPATIENT)
Age: 71
End: 2021-01-15

## 2021-02-01 DIAGNOSIS — R97.20 ELEVATED PROSTATE SPECIFIC ANTIGEN (PSA): ICD-10-CM

## 2021-02-01 PROCEDURE — 84153 ASSAY OF PSA TOTAL: CPT | Performed by: INTERNAL MEDICINE

## 2021-02-01 PROCEDURE — 36415 COLL VENOUS BLD VENIPUNCTURE: CPT | Performed by: INTERNAL MEDICINE

## 2021-02-01 NOTE — LETTER
February 2, 2021      José Antonio Saha  [address]  [place] [address]        Dear DrewYifan,    We are writing to inform you of your test results.    The following letter pertains to your most recent diagnostic tests:     Unfortunately, the PSA seems to be steadily rising.  I would recommend scheduling an virtual visit with Dr. Ayala to see what he thinks about this.           Resulted Orders   PSA tumor marker   Result Value Ref Range    PSA 13.30 (H) 0 - 4 ug/L      Comment:      Assay Method:  Chemiluminescence using Siemens Vista analyzer       If you have any questions or concerns, please call the clinic at the number listed above.       Sincerely,      Kirk Chavez MD

## 2021-02-02 LAB — PSA SERPL-MCNC: 13.3 UG/L (ref 0–4)

## 2021-02-02 NOTE — RESULT ENCOUNTER NOTE
The following letter pertains to your most recent diagnostic tests:    Unfortunately, the PSA seems to be steadily rising.  I would recommend scheduling an virtual visit with Dr. Ayala to see what he thinks about this.         Sincerely,    Dr. Chavez

## 2021-02-10 ENCOUNTER — TELEPHONE (OUTPATIENT)
Dept: FAMILY MEDICINE | Facility: CLINIC | Age: 71
End: 2021-02-10

## 2021-02-10 NOTE — TELEPHONE ENCOUNTER
Incoming call  From [facility]    Pt recently switched insurances      needs to get a new script    They need him to get 2 more UTIs to qualify for catheter supplies     Recent switched to medicare from commercial insurance. Has already had numerous UTIs     [address] [facility] had this on their Rx a while back     Reviewed chart, advises we don't have record of DME order for cath supplies, looks like pt sees [facility] for [facility]. She will call them    Stacy CASILLAS RN

## 2021-02-11 NOTE — TELEPHONE ENCOUNTER
Babita calling back from [facility]. States that she called [facility] only has orders on file for condom cath.    Last signed order for intermittent cath supplies 12/8/2020 signed by Dr. Chavez.    [facility] needs this reordered by Dr. Chavez due new insurance. [facility] will fax to Dr. Chavez for orders for intermittent cath with separate bag. Cannot order with bag attached until 2 documented UTI's.    Please watch for the fax. Please see number above if questions.     Shari Hart RN

## 2021-02-25 DIAGNOSIS — N31.9 NEUROGENIC BLADDER: ICD-10-CM

## 2021-02-25 DIAGNOSIS — N39.0 RECURRENT UTI: ICD-10-CM

## 2021-02-25 PROCEDURE — 87186 SC STD MICRODIL/AGAR DIL: CPT | Performed by: INTERNAL MEDICINE

## 2021-02-25 PROCEDURE — 87086 URINE CULTURE/COLONY COUNT: CPT | Performed by: INTERNAL MEDICINE

## 2021-02-25 PROCEDURE — 87088 URINE BACTERIA CULTURE: CPT | Performed by: INTERNAL MEDICINE

## 2021-02-25 NOTE — LETTER
March 1, 2021      José Antonio Saha  [address]  [place] [address]        Dear ,    The following letter pertains to your most recent diagnostic tests:     This culture result shows evidence of a urinary tract infection that should respond to bactrim.       Resulted Orders   Urine Culture Aerobic Bacterial   Result Value Ref Range    Specimen Description Midstream Urine     Special Requests Specimen received in preservative     Culture Micro >100,000 colonies/mL  Klebsiella pneumoniae   (A)        If you have any questions or concerns, please call the clinic at the number listed above.       Sincerely,      Kirk Chavez MD      juan

## 2021-02-26 LAB
BACTERIA SPEC CULT: ABNORMAL
Lab: ABNORMAL
SPECIMEN SOURCE: ABNORMAL

## 2021-02-28 NOTE — RESULT ENCOUNTER NOTE
The following letter pertains to your most recent diagnostic tests:    This culture result shows evidence of a urinary tract infection that should respond to bactrim.        Sincerely,    Dr. Chavez

## 2021-04-19 ENCOUNTER — OFFICE VISIT (OUTPATIENT)
Dept: FAMILY MEDICINE | Facility: CLINIC | Age: 71
End: 2021-04-19
Payer: MEDICARE

## 2021-04-19 VITALS
SYSTOLIC BLOOD PRESSURE: 97 MMHG | HEART RATE: 69 BPM | DIASTOLIC BLOOD PRESSURE: 61 MMHG | HEIGHT: 72 IN | TEMPERATURE: 97.7 F | OXYGEN SATURATION: 93 % | WEIGHT: 168 LBS | BODY MASS INDEX: 22.75 KG/M2

## 2021-04-19 DIAGNOSIS — Z01.818 PRE-OPERATIVE EXAMINATION: Primary | ICD-10-CM

## 2021-04-19 DIAGNOSIS — C61 PROSTATE CANCER (H): ICD-10-CM

## 2021-04-19 DIAGNOSIS — G82.50 CHRONIC INCOMPLETE SPASTIC TETRAPLEGIA (H): ICD-10-CM

## 2021-04-19 LAB
BASOPHILS # BLD AUTO: 0 10E9/L (ref 0–0.2)
BASOPHILS NFR BLD AUTO: 0.6 %
DIFFERENTIAL METHOD BLD: NORMAL
EOSINOPHIL # BLD AUTO: 0.1 10E9/L (ref 0–0.7)
EOSINOPHIL NFR BLD AUTO: 2 %
ERYTHROCYTE [DISTWIDTH] IN BLOOD BY AUTOMATED COUNT: 13.6 % (ref 10–15)
HCT VFR BLD AUTO: 41.2 % (ref 40–53)
HGB BLD-MCNC: 14.5 G/DL (ref 13.3–17.7)
LYMPHOCYTES # BLD AUTO: 1.8 10E9/L (ref 0.8–5.3)
LYMPHOCYTES NFR BLD AUTO: 25.4 %
MCH RBC QN AUTO: 32.8 PG (ref 26.5–33)
MCHC RBC AUTO-ENTMCNC: 35.2 G/DL (ref 31.5–36.5)
MCV RBC AUTO: 93 FL (ref 78–100)
MONOCYTES # BLD AUTO: 0.8 10E9/L (ref 0–1.3)
MONOCYTES NFR BLD AUTO: 11.8 %
NEUTROPHILS # BLD AUTO: 4.3 10E9/L (ref 1.6–8.3)
NEUTROPHILS NFR BLD AUTO: 60.2 %
PLATELET # BLD AUTO: 304 10E9/L (ref 150–450)
RBC # BLD AUTO: 4.42 10E12/L (ref 4.4–5.9)
WBC # BLD AUTO: 7.1 10E9/L (ref 4–11)

## 2021-04-19 PROCEDURE — 99214 OFFICE O/P EST MOD 30 MIN: CPT | Performed by: NURSE PRACTITIONER

## 2021-04-19 PROCEDURE — 85025 COMPLETE CBC W/AUTO DIFF WBC: CPT | Performed by: NURSE PRACTITIONER

## 2021-04-19 PROCEDURE — 84153 ASSAY OF PSA TOTAL: CPT | Performed by: NURSE PRACTITIONER

## 2021-04-19 PROCEDURE — 80048 BASIC METABOLIC PNL TOTAL CA: CPT | Performed by: NURSE PRACTITIONER

## 2021-04-19 PROCEDURE — 36415 COLL VENOUS BLD VENIPUNCTURE: CPT | Performed by: NURSE PRACTITIONER

## 2021-04-19 RX ORDER — SENNOSIDES A AND B 8.6 MG/1
4 TABLET, FILM COATED ORAL DAILY
COMMUNITY
Start: 2021-04-19 | End: 2024-07-22

## 2021-04-19 ASSESSMENT — MIFFLIN-ST. JEOR: SCORE: 1565.04

## 2021-04-19 NOTE — PROGRESS NOTES
[address] [address]  Phone: [phone]  Primary Provider: Kirk Chavez  Pre-op Performing Provider: TONEY JIANG      PREOPERATIVE EVALUATION:  Today's date: 4/19/2021    José Antonio Saha is a 71 year old male who presents for a preoperative evaluation.    Surgical Information:  Surgery/Procedure: Baclofen Pump  Surgery Location: [facility]  Surgeon: Jamar  Surgery Date: 5/11/21  Time of Surgery: 7:30AM  Where patient plans to recover: At home with family  Fax number for surgical facility: [phone]    Type of Anesthesia Anticipated: General    Assessment & Plan     The proposed surgical procedure is considered intermediate risk.    Pre-operative examination  - CBC with platelets and differential  - Basic metabolic panel  (Ca, Cl, CO2, Creat, Gluc, K, Na, BUN)    Chronic incomplete spastic tetraplegia (H)    Prostate cancer (H)  - PSA, tumor marker      Risks and Recommendations:  The patient has the following additional risks and recommendations for perioperative complications:   - hypotension    Medication Instructions:  Patient is to take all scheduled medications on the day of surgery    RECOMMENDATION:  APPROVAL GIVEN to proceed with proposed procedure, without further diagnostic evaluation.  [id number]    Subjective     HPI related to upcoming procedure:  Baclofen pump placement    Preop Questions 4/19/2021   1. Have you ever had a heart attack or stroke? No   2. Have you ever had surgery on your heart or blood vessels, such as a stent placement, a coronary artery bypass, or surgery on an artery in your head, neck, heart, or legs? No   3. Do you have chest pain with activity? No   4. Do you have a history of  heart failure? No   5. Do you currently have a cold, bronchitis or symptoms of other infection? No   6. Do you have a cough, shortness of breath, or wheezing? No   7. Do you or anyone in your family have previous history of blood clots? No    8. Do you or does anyone in your family have a serious bleeding problem such as prolonged bleeding following surgeries or cuts? No   9. Have you ever had problems with anemia or been told to take iron pills? No   10. Have you had any abnormal blood loss such as black, tarry or bloody stools? No   11. Have you ever had a blood transfusion? No   12. Are you willing to have a blood transfusion if it is medically needed before, during, or after your surgery? Yes   13. Have you or any of your relatives ever had problems with anesthesia? No   14. Do you have sleep apnea, excessive snoring or daytime drowsiness? YES -    14a. Do you have a CPAP machine? No   15. Do you have any artifical heart valves or other implanted medical devices like a pacemaker, defibrillator, or continuous glucose monitor? No   16. Do you have artificial joints? No   17. Are you allergic to latex? No       Health Care Directive:  Patient does not have a Health Care Directive or Living Will: Patient states has Advance Directive and will bring in a copy to clinic.    Preoperative Review of :   reviewed - no record of controlled substances prescribed.      Status of Chronic Conditions:  See problem list for active medical problems.  Problems all longstanding and stable, except as noted/documented.  See ROS for pertinent symptoms related to these conditions.    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 and has not had UTI since January    MUSCULOSKELETAL: NEGATIVE for significant arthralgias or myalgia,POSITIVE: spasticity ENDOCRINE: NEGATIVE for temperature intolerance, skin/hair changes  HEME: NEGATIVE for bleeding problems  PSYCHIATRIC: NEGATIVE for changes in mood or affect    Patient Active Problem List    Diagnosis Date Noted     Right shoulder pain, unspecified chronicity 05/07/2018     Priority: Medium     Cervical segment dysfunction 05/07/2018     Priority: Medium     Right supraspinatus tendinitis 05/07/2018     Priority: Medium     Thoracic segment dysfunction 05/07/2018     Priority: Medium     Sessile colonic polyp 07/29/2016     Priority: Medium     Basal cell carcinoma of skin of trunk 11/21/2013     Priority: Medium     Basal cell carcinoma of nose 11/21/2013     Priority: Medium     s/p Mohs       CARDIOVASCULAR SCREENING; LDL GOAL LESS THAN 160 09/07/2011     Priority: Medium     Special screening for malignant neoplasm of prostate 09/15/2003     Priority: Medium      Past Medical History:   Diagnosis Date     Basal cell carcinoma      Malignant melanoma of other specified sites of skin 1980s    no recurrence     Personal history of other malignant neoplasm of skin 4/15/02    recurrent BCC scalp and face     Routine general medical examination at a health care facility 4/12/02     Scrotal varices     L scrotum     Past Surgical History:   Procedure Laterality Date     COLONOSCOPY  11/19/2013    Procedure: COMBINED COLONOSCOPY, SINGLE BIOPSY/POLYPECTOMY BY BIOPSY;  COLONOSCOPY;  Surgeon: Adam Lopez MD;  Location:  GI     COLONOSCOPY N/A 9/29/2016    Procedure: COLONOSCOPY;  Surgeon: Paul Ken MD;  Location:  GI     [facility] NONSPECIFIC PROCEDURE  6/27/02    colonoscopy with polypectomy x3, fulguration x1     [facility] NONSPECIFIC PROCEDURE      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     [facility] NONSPECIFIC PROCEDURE      multiple skin Bxs     [facility] NONSPECIFIC PROCEDURE      tonsillectomy     Current Outpatient Medications    Medication Sig Dispense Refill     Acetaminophen (TYLENOL) 325 MG CAPS Take 325-650 mg by mouth as needed       amoxicillin (AMOXIL) 875 MG tablet Take 1 tablet (875 mg) by mouth 2 times daily 20 tablet 3     baclofen (LIORESAL) 20 MG tablet Take 1 tablet (20 mg) by mouth 4 times daily 360 tablet 3     docusate sodium (ENEMEEZ) 283 MG enema Place 1 enema rectally daily 90 enema 3     Ethyl Alcohol, Skin Cleanser, 62 % LIQD Apply to hands as needed prior to self cath. 473 mL 1     saline 0.65 % (Soln) SOLN Spray in nostril as needed       senna (SENOKOT) 8.6 MG tablet Take 4 tablets by mouth daily       sodium chloride (SHANNA 128) 5 % ophthalmic ointment 1 Application as needed for dry eyes       sulfamethoxazole-trimethoprim (BACTRIM DS) 800-160 MG tablet Take one tablet twice daily for 10 days at first sign of urinary tract infection 40 tablet 2     sulfamethoxazole-trimethoprim (BACTRIM DS) 800-160 MG tablet Take 1 tablet by mouth 2 times daily 20 tablet 0     tiZANidine (ZANAFLEX) 4 MG tablet Take 1 tablet (4 mg) by mouth 3 times daily 270 tablet 3     tolterodine (DETROL) 2 MG tablet Take 1 tablet (2 mg) by mouth 2 times daily (Patient taking differently: Take 2 mg by mouth daily ) 180 tablet 3     UNABLE TO FIND MEDICATION NAME: Guar Gum Powder       Vitamin D, Cholecalciferol, 25 MCG (1000 UT) CAPS Take by mouth daily          Allergies   Allergen Reactions     No Known Drug Allergies         Social History     Tobacco Use     Smoking status: Never Smoker     Smokeless tobacco: Never Used   Substance Use Topics     Alcohol use: Yes     Alcohol/week: 0.0 standard drinks     Comment: 2  drinks per week     History   Drug Use Not on file         Objective     BP 97/61 (BP Location: Left arm, Patient Position: Chair, Cuff Size: Adult Regular)   Pulse 69   Temp 97.7  F (36.5  C) (Oral)   Ht 1.829 m (6')   Wt 76.2 kg (168 lb)   SpO2 93%   BMI 22.78 kg/m      Physical Exam    GENERAL APPEARANCE: wheelchair  bound,  alert and no distress at this time     EYES: EOMI,  PERRL     HENT: ear canals and TM's normal and nose and mouth without ulcers or lesions     NECK: no adenopathy, no asymmetry, masses, or scars and thyroid normal to palpation     RESP: lungs clear to auscultation - no rales, rhonchi or wheezes     CV: regular rates and rhythm, normal S1 S2, no S3 or S4 and no murmur, click or rub     ABDOMEN:  soft, nontender, no HSM or masses and bowel sounds normal     MS: loss of tone and strength of all four extremities     SKIN: no suspicious lesions or rashes     NEURO: mentation intact and speech normal     PSYCH: mentation appears normal. and affect normal/bright     LYMPHATICS: No cervical adenopathy    Recent Labs   Lab Test 11/19/20  1520   HGB 14.5         POTASSIUM 4.4   CR 0.71        Diagnostics:    Results for orders placed or performed in visit on 04/19/21   CBC with platelets and differential     Status: None   Result Value Ref Range    WBC 7.1 4.0 - 11.0 10e9/L    RBC Count 4.42 4.4 - 5.9 10e12/L    Hemoglobin 14.5 13.3 - 17.7 g/dL    Hematocrit 41.2 40.0 - 53.0 %    MCV 93 78 - 100 fl    MCH 32.8 26.5 - 33.0 pg    MCHC 35.2 31.5 - 36.5 g/dL    RDW 13.6 10.0 - 15.0 %    Platelet Count 304 150 - 450 10e9/L    % Neutrophils 60.2 %    % Lymphocytes 25.4 %    % Monocytes 11.8 %    % Eosinophils 2.0 %    % Basophils 0.6 %    Absolute Neutrophil 4.3 1.6 - 8.3 10e9/L    Absolute Lymphocytes 1.8 0.8 - 5.3 10e9/L    Absolute Monocytes 0.8 0.0 - 1.3 10e9/L    Absolute Eosinophils 0.1 0.0 - 0.7 10e9/L    Absolute Basophils 0.0 0.0 - 0.2 10e9/L    Diff Method Automated Method    Basic metabolic panel  (Ca, Cl, CO2, Creat, Gluc, K, Na, BUN)     Status: None   Result Value Ref Range    Sodium 137 133 - 144 mmol/L    Potassium 4.4 3.4 - 5.3 mmol/L    Chloride 106 94 - 109 mmol/L    Carbon Dioxide 28 20 - 32 mmol/L    Anion Gap 3 3 - 14 mmol/L    Glucose 87 70 - 99 mg/dL    Urea Nitrogen 11 7 - 30 mg/dL     Creatinine 0.78 0.66 - 1.25 mg/dL    GFR Estimate >90 >60 mL/min/[1.73_m2]    GFR Estimate If Black >90 >60 mL/min/[1.73_m2]    Calcium 8.9 8.5 - 10.1 mg/dL     No EKG required, no history of coronary heart disease, significant arrhythmia, peripheral arterial disease or other structural heart disease.    Revised Cardiac Risk Index (RCRI):  The patient has the following serious cardiovascular risks for perioperative complications:   - No serious cardiac risks = 0 points     RCRI Interpretation: 0 points: Class I (very low risk - 0.4% complication rate)           Signed Electronically by: DEE Rondon CNP  Copy of this evaluation report is provided to requesting physician.

## 2021-04-20 LAB
ANION GAP SERPL CALCULATED.3IONS-SCNC: 3 MMOL/L (ref 3–14)
BUN SERPL-MCNC: 11 MG/DL (ref 7–30)
CALCIUM SERPL-MCNC: 8.9 MG/DL (ref 8.5–10.1)
CHLORIDE SERPL-SCNC: 106 MMOL/L (ref 94–109)
CO2 SERPL-SCNC: 28 MMOL/L (ref 20–32)
CREAT SERPL-MCNC: 0.78 MG/DL (ref 0.66–1.25)
GFR SERPL CREATININE-BSD FRML MDRD: >90 ML/MIN/{1.73_M2}
GLUCOSE SERPL-MCNC: 87 MG/DL (ref 70–99)
POTASSIUM SERPL-SCNC: 4.4 MMOL/L (ref 3.4–5.3)
SODIUM SERPL-SCNC: 137 MMOL/L (ref 133–144)

## 2021-04-21 LAB — PSA SERPL-MCNC: 12.2 UG/L (ref 0–4)

## 2021-04-22 NOTE — RESULT ENCOUNTER NOTE
You've probably already seen this, Tawanda.  The PSA is slightly lower.  It's going in the right direction

## 2021-05-11 ENCOUNTER — TRANSFERRED RECORDS (OUTPATIENT)
Dept: HEALTH INFORMATION MANAGEMENT | Facility: CLINIC | Age: 71
End: 2021-05-11

## 2021-05-27 ENCOUNTER — TELEPHONE (OUTPATIENT)
Dept: FAMILY MEDICINE | Facility: CLINIC | Age: 71
End: 2021-05-27

## 2021-05-27 NOTE — TELEPHONE ENCOUNTER
Naheed from pt's medical DME company, [employer] needed proof of cultured UA's from pt- at least 2 within 30 day period. Sent 4 from 7/15, 8/3, 8/18, 9/13 in 2020 as supporting documentation for coude catheter order.    Was using Catheter  Now using      Faxed to [phone]  Attn Naheed:     DME co to call back if further needs.  .  Yi Astudillo, RN  [facility] RN Triage Team

## 2021-06-03 ENCOUNTER — OFFICE VISIT (OUTPATIENT)
Dept: FAMILY MEDICINE | Facility: CLINIC | Age: 71
End: 2021-06-03
Payer: MEDICARE

## 2021-06-03 VITALS
WEIGHT: 170 LBS | TEMPERATURE: 98.4 F | BODY MASS INDEX: 23.03 KG/M2 | HEART RATE: 69 BPM | SYSTOLIC BLOOD PRESSURE: 109 MMHG | HEIGHT: 72 IN | OXYGEN SATURATION: 97 % | DIASTOLIC BLOOD PRESSURE: 67 MMHG

## 2021-06-03 DIAGNOSIS — Z87.828 H/O SPINAL CORD INJURY: ICD-10-CM

## 2021-06-03 PROCEDURE — 99214 OFFICE O/P EST MOD 30 MIN: CPT | Performed by: INTERNAL MEDICINE

## 2021-06-03 RX ORDER — TOLTERODINE TARTRATE 2 MG/1
2 TABLET, EXTENDED RELEASE ORAL 2 TIMES DAILY
Qty: 180 TABLET | Refills: 3 | COMMUNITY
Start: 2021-06-03 | End: 2022-01-06

## 2021-06-03 ASSESSMENT — MIFFLIN-ST. JEOR: SCORE: 1574.11
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
## 2021-06-07 NOTE — TELEPHONE ENCOUNTER
Naheed is sending fax over attention Yi.  May go to Dr. Chavez.  They need additional support, from past UA's but if we don't have it, we can just document that we do not have it and fax back.  Yi Astudillo, RN  [facility] RN Triage Team

## 2021-06-08 ENCOUNTER — TELEPHONE (OUTPATIENT)
Dept: FAMILY MEDICINE | Facility: CLINIC | Age: 71
End: 2021-06-08

## 2021-06-08 NOTE — TELEPHONE ENCOUNTER
Babita from [facility], who supplies patient with cathter equipment, requesting information to be faxed.    -proof of cultured UA from patient on 2/25/21, showing culture count.     Also medicare requires chart note that states patient has incontinence or retention of urine-Provided note from Hospital admission on 5/11/21.    Information to faxed.    Babita [facility]  [phone]- okay to leave detailed voice mail.  Fax :[phone]    Jyothi Lynn RN  [facility]    .

## 2021-06-09 ENCOUNTER — TELEPHONE (OUTPATIENT)
Dept: FAMILY MEDICINE | Facility: CLINIC | Age: 71
End: 2021-06-09

## 2021-06-09 NOTE — TELEPHONE ENCOUNTER
FEver higher than 100. 4  WBC of 5 or more   Change in Urinary frequency, urgency or incontinence   Appearance new or increase in sweating  BP elevating  Any signs increased muscle spasms    2/25/21

## 2021-06-09 NOTE — TELEPHONE ENCOUNTER
"Received fax from Babita from [facility]  Asking for us to send over a second qualifier for patients lab that was collected on 2/25. Fax states that \"the second qualifier is usually found in the UA's from that collection date or in the chart notes from an office visit around that collection date\".    Writer called Babita ([phone]- okay to leave detailed voice mail.) and left voicemail to clarify.    Once information received fax to [phone].    Jyothi Lynn RN  [facility]    "

## 2021-06-09 NOTE — TELEPHONE ENCOUNTER
LVM for patient to return call back to clinic - please help patient schedule a Virtual appt per Dr. Chavez to review Labs.    Stephenie GUERRERO MA on 6/9/2021 at 3:29 PM

## 2021-06-10 ENCOUNTER — TELEPHONE (OUTPATIENT)
Dept: FAMILY MEDICINE | Facility: CLINIC | Age: 71
End: 2021-06-10

## 2021-06-10 NOTE — TELEPHONE ENCOUNTER
Babita from [facility] [employer]- catheter supplier  Phone:[phone]  FAX: [phone]    For medicare purposes, pt he has to have 2 UTI 30 days apart. Pt has had 2 UTI 30 days apart they just need documentation of what precipitated the UC on 2-25-21 Note of what symptoms pt had that initiated the UA would be helpful.     If appropriate; please fax note to number above.     Tory Arrieta RN

## 2021-06-10 NOTE — TELEPHONE ENCOUNTER
Faxed a note of this to Babita.  Hopefully this one works for this pt who just needs the appropriate catheters.   Yi Astudillo, RN  [facility] RN Triage Team

## 2021-06-10 NOTE — TELEPHONE ENCOUNTER
This was done yesterday and another fax sent today with further information from other encounter.  Yi Astudillo, RN  [facility] RN Triage Team

## 2021-06-15 NOTE — PATIENT INSTRUCTIONS
Levaquin daily for 7 days.   Push Fluids  Tylenol as needed for pain      Follow up with PCP or Urology regarding frequent recurrent UTIs.     musculoskeletal

## 2021-06-16 ENCOUNTER — OFFICE VISIT (OUTPATIENT)
Dept: FAMILY MEDICINE | Facility: CLINIC | Age: 71
End: 2021-06-16
Payer: MEDICARE

## 2021-06-16 VITALS
DIASTOLIC BLOOD PRESSURE: 60 MMHG | HEIGHT: 72 IN | BODY MASS INDEX: 23.03 KG/M2 | TEMPERATURE: 96.2 F | HEART RATE: 80 BPM | WEIGHT: 170 LBS | OXYGEN SATURATION: 96 % | SYSTOLIC BLOOD PRESSURE: 88 MMHG

## 2021-06-16 DIAGNOSIS — F43.21 ADJUSTMENT DISORDER WITH DEPRESSED MOOD: ICD-10-CM

## 2021-06-16 DIAGNOSIS — N39.0 RECURRENT UTI: ICD-10-CM

## 2021-06-16 DIAGNOSIS — N31.9 NEUROGENIC BLADDER: ICD-10-CM

## 2021-06-16 DIAGNOSIS — R32 URINARY INCONTINENCE, UNSPECIFIED TYPE: Primary | ICD-10-CM

## 2021-06-16 DIAGNOSIS — M99.01 CERVICAL SEGMENT DYSFUNCTION: ICD-10-CM

## 2021-06-16 PROCEDURE — 99214 OFFICE O/P EST MOD 30 MIN: CPT | Performed by: INTERNAL MEDICINE

## 2021-06-16 RX ORDER — BACLOFEN 10 MG/1
TABLET ORAL
COMMUNITY

## 2021-06-16 ASSESSMENT — MIFFLIN-ST. JEOR: SCORE: 1574.11
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
## 2021-06-16 NOTE — LETTER
June 16, 2021          To Whom it May Concern:        José Antonio Saha has a history of cervical spine injury with resultant neurogenic bladder and  recurrent urinary tract infections.  He had an E. coli urinary tract infection in January 2021 with symptoms including increased sweats, increased spasticity from baseline and increased episodes of incontinence.  He had a Klebsiella pneumoniae urinary tract infection in February 2021 that also included symptoms of increased sweats, increased incontinence and increased spasticity from baseline.  He would like to change the catheter that is currently being provided to him to a catheter from which he has not had infections.  The name of the catheter he with which he has had success in the past is the Coloplast compact speedy cath for males.  One other advantage of this catheter is that he is able to self cath without the assistance of his wife when using this catheter.  Because of the 2 documented urinary tract infections in January and February 2021 with separate organisms associated with symptoms, I believe, that the Coloplast compact speedy catheters for males are medically necessary for this patient.  Furthermore, the use of these catheters would allow him to self catheterize independently without the assistance of his spouse or other care providers.  Please take these matters into consideration when determining whether to provide insurance coverage for these catheters.      Sincerely,    Kirk Chavez MD

## 2021-06-23 DIAGNOSIS — N39.0 RECURRENT UTI: ICD-10-CM

## 2021-06-23 DIAGNOSIS — N31.9 NEUROGENIC BLADDER: ICD-10-CM

## 2021-06-23 PROCEDURE — 87086 URINE CULTURE/COLONY COUNT: CPT | Performed by: INTERNAL MEDICINE

## 2021-06-24 LAB
BACTERIA SPEC CULT: NORMAL
Lab: NORMAL
SPECIMEN SOURCE: NORMAL

## 2021-06-24 NOTE — RESULT ENCOUNTER NOTE
The following letter pertains to your most recent diagnostic tests:      This is a negative urine culture result      Sincerely,    Dr. Chavez

## 2021-06-30 ENCOUNTER — TELEPHONE (OUTPATIENT)
Dept: FAMILY MEDICINE | Facility: CLINIC | Age: 71
End: 2021-06-30

## 2021-06-30 NOTE — TELEPHONE ENCOUNTER
Jada from [facility] called requesting more information in order for pt to meet insurance criteria for catheter order. She send a fax information listing information needed. She was given fax number [phone].

## 2021-07-16 ENCOUNTER — APPOINTMENT (OUTPATIENT)
Dept: LAB | Facility: CLINIC | Age: 71
End: 2021-07-16
Payer: MEDICARE

## 2021-07-17 ENCOUNTER — VIRTUAL VISIT (OUTPATIENT)
Dept: URGENT CARE | Facility: CLINIC | Age: 71
End: 2021-07-17
Payer: MEDICARE

## 2021-07-17 DIAGNOSIS — N39.0 URINARY TRACT INFECTION ASSOCIATED WITH CATHETERIZATION OF URINARY TRACT, UNSPECIFIED INDWELLING URINARY CATHETER TYPE, INITIAL ENCOUNTER (H): Primary | ICD-10-CM

## 2021-07-17 DIAGNOSIS — T83.511A URINARY TRACT INFECTION ASSOCIATED WITH CATHETERIZATION OF URINARY TRACT, UNSPECIFIED INDWELLING URINARY CATHETER TYPE, INITIAL ENCOUNTER (H): Primary | ICD-10-CM

## 2021-07-17 PROCEDURE — 99442 PR PHYSICIAN TELEPHONE EVALUATION 11-20 MIN: CPT | Mod: 95

## 2021-07-19 ENCOUNTER — TELEPHONE (OUTPATIENT)
Dept: FAMILY MEDICINE | Facility: CLINIC | Age: 71
End: 2021-07-19

## 2021-07-19 DIAGNOSIS — R32 URINARY INCONTINENCE, UNSPECIFIED TYPE: Primary | ICD-10-CM

## 2021-07-19 DIAGNOSIS — N39.0 RECURRENT UTI: ICD-10-CM

## 2021-07-19 NOTE — TELEPHONE ENCOUNTER
Reason for Call:  Other     Detailed comments: Pt calling in inquiring on status of UA dropped off on 7/16. Writer was unable to find results under laboratory tab. Confirmed pt dropped off UA in appt desk and confirmed standing orders from . Writer called lab and spoke with Valerie. Valerie confirmed that they never ran a test on the received UA. Error with new system. Pt is requesting that this is documented in his chart and MyChart as he is trying to get a new catheter to be covered by insurance but they will not cover it without an updated UA to show infection. Pt is now on antibiotics and cannot give another sample.     Phone Number Patient can be reached at: [phone]    Best Time: Any    Can we leave a detailed message on this number? YES    Call taken on 7/19/2021 at 4:42 PM by Kayy Davey

## 2021-07-21 NOTE — TELEPHONE ENCOUNTER
Does not look like there is an order in patient's chart for a UA/UC    Last UC was in June     Pended new order     Stacy CASILLAS RN

## 2021-07-22 NOTE — TELEPHONE ENCOUNTER
----- Message from Guido Lowe MD sent at 7/25/2019  8:48 AM CDT -----  Uti: on bactrim abhinav u/a afte rx  Cri some progression  Abhinav bmp 3 months   I wrote the standing order  It makes sense to have that available   Please inform patient

## 2021-07-22 NOTE — TELEPHONE ENCOUNTER
"Called and talked with patient.      Patient appreciative of today's order. Patient won't be using that order at this time since he's current on an abx for UTI, however realizes it's available for the future.     Patient states needing to have UA/UC approx every 2 mths.   Asking if today's lab order---can actually be changed to \"standing\" instead of just the 1 time future order?      Currently has a Urine Culture \"standing order\" available until 10/29/21, however states he prefers the UA with Microscopic reflex to Culture order going forward.   Would you be willing to place that standing order?      [phone]  May leave detailed msg/orders on voice mail.     Nova DUONG, RN      July 22, 2021  5:45 PM        "

## 2021-07-23 NOTE — TELEPHONE ENCOUNTER
PCP please review and Confirm  standing order.    Patient requested:   UA with Microscopic reflex to Culture [id number]    Ordered:  UA Macro with Reflex to Micro and Culture [id number]    Please advise.    Cathy An RN

## 2021-07-26 NOTE — TELEPHONE ENCOUNTER
Left detailed message to notify patient of standing orders placed for urine test    Stacy CASILLAS RN

## 2021-09-20 ENCOUNTER — OFFICE VISIT (OUTPATIENT)
Dept: FAMILY MEDICINE | Facility: CLINIC | Age: 71
End: 2021-09-20
Payer: MEDICARE

## 2021-09-20 VITALS
SYSTOLIC BLOOD PRESSURE: 105 MMHG | HEIGHT: 72 IN | BODY MASS INDEX: 23.03 KG/M2 | WEIGHT: 170 LBS | HEART RATE: 76 BPM | DIASTOLIC BLOOD PRESSURE: 67 MMHG | OXYGEN SATURATION: 97 % | TEMPERATURE: 97.4 F

## 2021-09-20 DIAGNOSIS — Z87.828 H/O SPINAL CORD INJURY: ICD-10-CM

## 2021-09-20 DIAGNOSIS — Z00.00 ROUTINE GENERAL MEDICAL EXAMINATION AT A HEALTH CARE FACILITY: Primary | ICD-10-CM

## 2021-09-20 DIAGNOSIS — N39.0 RECURRENT UTI: ICD-10-CM

## 2021-09-20 DIAGNOSIS — F43.21 ADJUSTMENT DISORDER WITH DEPRESSED MOOD: ICD-10-CM

## 2021-09-20 DIAGNOSIS — Z23 NEED FOR PROPHYLACTIC VACCINATION AND INOCULATION AGAINST INFLUENZA: ICD-10-CM

## 2021-09-20 DIAGNOSIS — Z12.11 COLON CANCER SCREENING: ICD-10-CM

## 2021-09-20 DIAGNOSIS — N31.9 NEUROGENIC BLADDER: ICD-10-CM

## 2021-09-20 DIAGNOSIS — C61 PROSTATE CANCER (H): ICD-10-CM

## 2021-09-20 DIAGNOSIS — K86.2 PANCREAS CYST: ICD-10-CM

## 2021-09-20 PROCEDURE — 90662 IIV NO PRSV INCREASED AG IM: CPT | Performed by: INTERNAL MEDICINE

## 2021-09-20 PROCEDURE — 99213 OFFICE O/P EST LOW 20 MIN: CPT | Mod: 25 | Performed by: INTERNAL MEDICINE

## 2021-09-20 PROCEDURE — G0008 ADMIN INFLUENZA VIRUS VAC: HCPCS | Performed by: INTERNAL MEDICINE

## 2021-09-20 PROCEDURE — G0439 PPPS, SUBSEQ VISIT: HCPCS | Performed by: INTERNAL MEDICINE

## 2021-09-20 RX ORDER — SULFAMETHOXAZOLE/TRIMETHOPRIM 800-160 MG
1 TABLET ORAL 2 TIMES DAILY
Qty: 20 TABLET | Refills: 3 | Status: SHIPPED | OUTPATIENT
Start: 2021-09-20 | End: 2022-01-06

## 2021-09-20 RX ORDER — SULFAMETHOXAZOLE/TRIMETHOPRIM 800-160 MG
1 TABLET ORAL 2 TIMES DAILY
Qty: 20 TABLET | Refills: 0 | Status: SHIPPED | OUTPATIENT
Start: 2021-09-20 | End: 2021-09-20

## 2021-09-20 ASSESSMENT — ENCOUNTER SYMPTOMS
HEMATOCHEZIA: 0
HEARTBURN: 0
HEADACHES: 0
COUGH: 0
PARESTHESIAS: 0
DIARRHEA: 0
NAUSEA: 0
DIZZINESS: 0
CONSTIPATION: 0
ARTHRALGIAS: 0
EYE PAIN: 0
PALPITATIONS: 0
ABDOMINAL PAIN: 0
JOINT SWELLING: 0
NERVOUS/ANXIOUS: 0
MYALGIAS: 0
DYSURIA: 0
HEMATURIA: 0
SHORTNESS OF BREATH: 0
WEAKNESS: 0
CHILLS: 0
FEVER: 0
FREQUENCY: 0
SORE THROAT: 0

## 2021-09-20 ASSESSMENT — ACTIVITIES OF DAILY LIVING (ADL)
CURRENT_FUNCTION: PREPARING MEALS REQUIRES ASSISTANCE
CURRENT_FUNCTION: MEDICATION ADMINISTRATION REQUIRES ASSISTANCE
CURRENT_FUNCTION: TRANSPORTATION REQUIRES ASSISTANCE
CURRENT_FUNCTION: LAUNDRY REQUIRES ASSISTANCE
CURRENT_FUNCTION: SHOPPING REQUIRES ASSISTANCE
CURRENT_FUNCTION: BATHING REQUIRES ASSISTANCE
CURRENT_FUNCTION: HOUSEWORK REQUIRES ASSISTANCE

## 2021-09-20 ASSESSMENT — MIFFLIN-ST. JEOR: SCORE: 1574.11

## 2021-09-20 NOTE — LETTER
October 8, 2021      José Antonio Saha  [address]  [place] [address]        Dear ,    We are writing to inform you of your test results.    The following letter pertains to your most recent diagnostic tests:       Good news! Your stool test for colon cancer screening is negative.  This should be repeated in one year.          Resulted Orders   Fecal colorectal cancer screen FIT   Result Value Ref Range    Occult Blood Screen FIT Negative Negative       If you have any questions or concerns, please call the clinic at the number listed above.       Sincerely,      Kirk Chavez MD

## 2021-09-20 NOTE — PROGRESS NOTES
"SUBJECTIVE:   José Antonio Saha is a 71 year old male who presents for Preventive Visit.      Patient has been advised of split billing requirements and indicates understanding: Yes   Are you in the first 12 months of your Medicare coverage?  No    Healthy Habits:     In general, how would you rate your overall health?  Good    Frequency of exercise:  6-7 days/week    Duration of exercise:  Greater than 60 minutes    Do you usually eat at least 4 servings of fruit and vegetables a day, include whole grains    & fiber and avoid regularly eating high fat or \"junk\" foods?  Yes    Taking medications regularly:  No    Medication side effects:  None    Ability to successfully perform activities of daily living:  Transportation requires assistance, shopping requires assistance, preparing meals requires assistance, housework requires assistance, bathing requires assistance, laundry requires assistance and medication administration requires assistance    Home Safety:  No safety concerns identified    Hearing Impairment:  No hearing concerns    In the past 6 months, have you been bothered by leaking of urine? Yes    In general, how would you rate your overall mental or emotional health?  Good      PHQ-2 Total Score: 0    Additional concerns today:  Yes      Zoloft is working well for his mood.  No significant side effects.  Spasticity is improving on the baclofen pump.  He continues to work hard with rehab team.  He has not had a recent urinary tract infection.  He saw a urologist regarding his recurrent urinary tract infections for which 1 year follow-up was recommended for concern that it may represent a cystic neoplasm.  Do you feel safe in your environment? Yes    Have you ever done Advance Care Planning? (For example, a Health Directive, POLST, or a discussion with a medical provider or your loved ones about your wishes):Yes, Adv care directive will be brought to clinic       Fall risk  Fallen 2 or more times in the past " year?: No  Any fall with injury in the past year?: No    Cognitive Screening   1) Repeat 3 items (Leader, Season, Table)    2) Clock draw: NORMAL  3) 3 item recall: Recalls 3 objects  Results: 3 items recalled: COGNITIVE IMPAIRMENT LESS LIKELY    Mini-CogTM Copyright EFRAÍN Vazquez. Licensed by the author for use in [facility]; reprinted with permission (isabelle@[facility]). All rights reserved.      Do you have sleep apnea, excessive snoring or daytime drowsiness?: no    Reviewed and updated as needed this visit by clinical staff  Tobacco  Allergies  Meds   Med Hx  Surg Hx  Fam Hx          Reviewed and updated as needed this visit by Provider  Tobacco     Med Hx  Surg Hx  Fam Hx         Social History     Tobacco Use     Smoking status: Never Smoker     Smokeless tobacco: Never Used   Substance Use Topics     Alcohol use: Yes     Alcohol/week: 0.0 standard drinks     Comment: 2  drinks per week       Alcohol Use 9/20/2021   Prescreen: >3 drinks/day or >7 drinks/week? No   Prescreen: >3 drinks/day or >7 drinks/week? -         Current providers sharing in care for this patient include:   Patient Care Team:  Kirk Chavez MD as PCP - General (Internal Medicine)  Kirk Chavez MD as Assigned PCP  Shawn Holcomb MD as MD (Dermatology)    The following health maintenance items are reviewed in Epic and correct as of today:  Health Maintenance Due   Topic Date Due     AORTIC ANEURYSM SCREENING (SYSTEM ASSIGNED)  Never done     COLORECTAL CANCER SCREENING  09/29/2021     Patient Active Problem List   Diagnosis     Special screening for malignant neoplasm of prostate     CARDIOVASCULAR SCREENING; LDL GOAL LESS THAN 160     Basal cell carcinoma of skin of trunk     Basal cell carcinoma of nose     Sessile colonic polyp     Right shoulder pain, unspecified chronicity     Cervical segment dysfunction     Right supraspinatus tendinitis     Thoracic segment dysfunction     Pancreas cyst     Prostate  cancer (H)     H/O spinal cord injury     Past Surgical History:   Procedure Laterality Date     COLONOSCOPY  11/19/2013    Procedure: COMBINED COLONOSCOPY, SINGLE BIOPSY/POLYPECTOMY BY BIOPSY;  COLONOSCOPY;  Surgeon: Adam Lopez MD;  Location:  GI     COLONOSCOPY N/A 9/29/2016    Procedure: COLONOSCOPY;  Surgeon: Paul Ken MD;  Location:  GI     [facility] NONSPECIFIC PROCEDURE  6/27/02    colonoscopy with polypectomy x3, fulguration x1     [facility] NONSPECIFIC PROCEDURE      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     [facility] NONSPECIFIC PROCEDURE      multiple skin Bxs     [facility] NONSPECIFIC PROCEDURE      tonsillectomy       Social History     Tobacco Use     Smoking status: Never Smoker     Smokeless tobacco: Never Used   Substance Use Topics     Alcohol use: Yes     Alcohol/week: 0.0 standard drinks     Comment: 2  drinks per week     Family History   Problem Relation Age of Onset     Cancer Mother         thyroid and lung     Prostate Cancer Father 65        uncle also had prostate Ca     Cerebrovascular Disease Father      Hypertension Father      Arthritis Daughter      Skin Cancer Daughter      Breast Cancer Sister      Cancer - colorectal No family hx of      Diabetes No family hx of          Current Outpatient Medications   Medication Sig Dispense Refill     Acetaminophen (TYLENOL) 325 MG CAPS Take 325-650 mg by mouth as needed       baclofen (LIORESAL) 10 MG tablet >>>IF PUMP FAILS  Take 10 mg six times a day IF PUMP FAILS<<<       docusate sodium (ENEMEEZ) 283 MG enema Place 1 enema rectally daily 90 enema 3     Ethyl Alcohol, Skin Cleanser, 62 % LIQD Apply to hands as needed prior to self cath. 473 mL 1     saline 0.65 % (Soln) SOLN Spray in nostril as needed       senna (SENOKOT) 8.6 MG tablet Take 4 tablets by mouth daily       sertraline (ZOLOFT) 50 MG tablet Take 50 mg by mouth daily       sodium chloride (SHANNA 128) 5 % ophthalmic ointment 1 Application as needed for dry eyes        sulfamethoxazole-trimethoprim (BACTRIM DS) 800-160 MG tablet Take 1 tablet by mouth 2 times daily 20 tablet 3     UNABLE TO FIND MEDICATION NAME: Benesys Powder, 3 tsp per day       Vitamin D, Cholecalciferol, 25 MCG (1000 UT) CAPS Take by mouth daily        tolterodine (DETROL) 2 MG tablet Take 1 tablet (2 mg) by mouth 2 times daily (Patient not taking: Reported on 9/20/2021) 180 tablet 3     Allergies   Allergen Reactions     No Known Drug Allergies        Review of Systems   Constitutional: Negative for chills and fever.   HENT: Negative for congestion, ear pain, hearing loss and sore throat.    Eyes: Negative for pain and visual disturbance.   Respiratory: Negative for cough and shortness of breath.    Cardiovascular: Negative for chest pain, palpitations and peripheral edema.   Gastrointestinal: Negative for abdominal pain, constipation, diarrhea, heartburn, hematochezia and nausea.   Genitourinary: Negative for discharge, dysuria, frequency, genital sores, hematuria, impotence and urgency.   Musculoskeletal: Negative for arthralgias, joint swelling and myalgias.   Skin: Negative for rash.   Neurological: Negative for dizziness, weakness, headaches and paresthesias.   Psychiatric/Behavioral: Negative for mood changes. The patient is not nervous/anxious.        OBJECTIVE:   /67 (BP Location: Right arm, Cuff Size: Adult Regular)   Pulse 76   Temp 97.4  F (36.3  C) (Tympanic)   Ht 1.829 m (6')   Wt 77.1 kg (170 lb)   SpO2 97%   BMI 23.06 kg/m   Estimated body mass index is 23.06 kg/m  as calculated from the following:    Height as of this encounter: 1.829 m (6').    Weight as of this encounter: 77.1 kg (170 lb).  Physical Exam  General: This is an improving appearing man in a wheelchair in no acute distress.  He appears quite comfortable.  HEENT: The bilateral tympanic membranes appear normal  Cardiovascular: The heart has a regular rate and rhythm without murmur gallop or rub.  No carotid  bruits.  Pulmonary: The lungs are clear to auscultation bilaterally, breathing is not labored  Abdomen: Soft, not distended, not tender, baclofen pump in right lower quadrant abdomen subcutaneous tissue  Extremities are with trace edema bilaterally, there is an AFO brace on the right foot  Neurological: Alert and oriented to person place and time, again less spasticity than previous exams  Mental State: Improved mood and affect, well-groomed, normal speech, reasonable insight and judgment        ASSESSMENT / PLAN:   (Z00.00) Routine general medical examination at a health care facility  (primary encounter diagnosis)  Comment:   Plan:     (Z87.828) H/O spinal cord injury  Comment:   Plan: Continue hard work with rehab team    (C61) Prostate cancer (H)  Comment: Discussed obtaining a PSA today at the 6-month interval, patient would prefer to wait for the 12-month interval which would be this spring  Plan: Plan for follow-up the spring with a PSA    (F43.21) Adjustment disorder with depressed mood  Comment: Significant improvement in mood with the addition of Zoloft  Plan: Continue current Zoloft    (K86.2) Pancreas cyst  Comment: Discussed rechecking the MRCP at the 1 year interval, he is not certain that he would want to have a Whipple procedure should the lesion turn out to be more concerning, which brings into question whether we should continue following the cyst, he will consider this  Plan: Consider MRCP at the 1 year interval    (N31.9) Neurogenic bladder  Comment: Continue follow-up with specialty urology  Plan:     (N39.0) Recurrent UTI  Comment: Gave refill on Bactrim that he uses after providing a sample to treat urinary tract infections on his own  Plan:     (Z12.11) Colon cancer screening  Comment: Involve care coordination to arrange for a admission prior to screening colonoscopy to assist with prep given his paralysis  Plan: Adult Gastro Ref - Procedure Only, Care         Coordination Referral             (Z23) Need for prophylactic vaccination and inoculation against influenza  Comment:   Plan: ADMIN INFLUENZA (For MEDICARE Patients ONLY)         [], INFLUENZA, QUAD, HIGH DOSE, PF, 65YR +        (FLUZONE HD)              Patient has been advised of split billing requirements and indicates understanding: Yes  COUNSELING:  Reviewed preventive health counseling, as reflected in patient instructions  Special attention given to:       Consider AAA screening for ages 65-75 and smoking history; normal caliber by CT in 8/2021 at [place] [facility]       Regular exercise       Healthy diet/nutrition       Immunizations    Flu shot today             Hepatitis C screening       HIV screening for high risk patient       Consider lung cancer screening for ages 55-80 years and 30 pack-year smoking history ; never smoker       Colon cancer screening; referred for screening colonoscopy; needs admit to help with prep due to paralysis        Prostate cancer screening; discussed PSA at 6 month interval vs. 12 months; he would prefer 12 months     Estimated body mass index is 23.06 kg/m  as calculated from the following:    Height as of this encounter: 1.829 m (6').    Weight as of this encounter: 77.1 kg (170 lb).        He reports that he has never smoked. He has never used smokeless tobacco.      Appropriate preventive services were discussed with this patient, including applicable screening as appropriate for cardiovascular disease, diabetes, osteopenia/osteoporosis, and glaucoma.  As appropriate for age/gender, discussed screening for colorectal cancer, prostate cancer, breast cancer, and cervical cancer. Checklist reviewing preventive services available has been given to the patient.    Reviewed patients plan of care and provided an AVS. The Basic Care Plan (routine screening as documented in Health Maintenance) for José Antonio meets the Care Plan requirement. This Care Plan has been established and reviewed with the  Patient.    Counseling Resources:  ATP IV Guidelines  Pooled Cohorts Equation Calculator  Breast Cancer Risk Calculator  Breast Cancer: Medication to Reduce Risk  FRAX Risk Assessment  ICSI Preventive Guidelines  Dietary Guidelines for Americans, 2010  USDA's MyPlate  ASA Prophylaxis  Lung CA Screening    Kirk Chavez MD  [facility]    Identified Health Risks:      Please note that José Antonio Saha had a urinalysis tested on September 21, 2021 for symptoms of sweating, autonomic dysreflexia, increase spasms and urinary incontinence.  The urine culture returned positive for urinary tract infection demonstrating greater than 100,000 colony-forming units of dysuria coli as well as ,000 colony-forming units of Klebsiella pneumonia.

## 2021-09-21 ENCOUNTER — LAB (OUTPATIENT)
Dept: LAB | Facility: CLINIC | Age: 71
End: 2021-09-21
Payer: MEDICARE

## 2021-09-21 ENCOUNTER — TELEPHONE (OUTPATIENT)
Dept: FAMILY MEDICINE | Facility: CLINIC | Age: 71
End: 2021-09-21

## 2021-09-21 ENCOUNTER — PATIENT OUTREACH (OUTPATIENT)
Dept: NURSING | Facility: CLINIC | Age: 71
End: 2021-09-21
Payer: MEDICARE

## 2021-09-21 DIAGNOSIS — Z12.11 COLON CANCER SCREENING: ICD-10-CM

## 2021-09-21 DIAGNOSIS — N39.0 RECURRENT UTI: ICD-10-CM

## 2021-09-21 DIAGNOSIS — N39.0 RECURRENT UTI: Primary | ICD-10-CM

## 2021-09-21 LAB
ALBUMIN UR-MCNC: NEGATIVE MG/DL
APPEARANCE UR: ABNORMAL
BACTERIA #/AREA URNS HPF: ABNORMAL /HPF
BILIRUB UR QL STRIP: NEGATIVE
COLOR UR AUTO: YELLOW
GLUCOSE UR STRIP-MCNC: NEGATIVE MG/DL
HGB UR QL STRIP: ABNORMAL
KETONES UR STRIP-MCNC: NEGATIVE MG/DL
LEUKOCYTE ESTERASE UR QL STRIP: ABNORMAL
NITRATE UR QL: POSITIVE
PH UR STRIP: 5.5 [PH] (ref 5–7)
RBC #/AREA URNS AUTO: ABNORMAL /HPF
SP GR UR STRIP: >=1.03 (ref 1–1.03)
UROBILINOGEN UR STRIP-ACNC: 0.2 E.U./DL
WBC #/AREA URNS AUTO: ABNORMAL /HPF

## 2021-09-21 PROCEDURE — 87186 SC STD MICRODIL/AGAR DIL: CPT

## 2021-09-21 PROCEDURE — 81001 URINALYSIS AUTO W/SCOPE: CPT

## 2021-09-21 PROCEDURE — 87086 URINE CULTURE/COLONY COUNT: CPT

## 2021-09-21 SDOH — ECONOMIC STABILITY: TRANSPORTATION INSECURITY
IN THE PAST 12 MONTHS, HAS THE LACK OF TRANSPORTATION KEPT YOU FROM MEDICAL APPOINTMENTS OR FROM GETTING MEDICATIONS?: NO

## 2021-09-21 SDOH — ECONOMIC STABILITY: FOOD INSECURITY: WITHIN THE PAST 12 MONTHS, THE FOOD YOU BOUGHT JUST DIDN'T LAST AND YOU DIDN'T HAVE MONEY TO GET MORE.: NEVER TRUE

## 2021-09-21 SDOH — ECONOMIC STABILITY: INCOME INSECURITY: IN THE LAST 12 MONTHS, WAS THERE A TIME WHEN YOU WERE NOT ABLE TO PAY THE MORTGAGE OR RENT ON TIME?: NO

## 2021-09-21 SDOH — ECONOMIC STABILITY: FOOD INSECURITY: WITHIN THE PAST 12 MONTHS, YOU WORRIED THAT YOUR FOOD WOULD RUN OUT BEFORE YOU GOT MONEY TO BUY MORE.: NEVER TRUE

## 2021-09-21 SDOH — ECONOMIC STABILITY: TRANSPORTATION INSECURITY
IN THE PAST 12 MONTHS, HAS LACK OF TRANSPORTATION KEPT YOU FROM MEETINGS, WORK, OR FROM GETTING THINGS NEEDED FOR DAILY LIVING?: NO

## 2021-09-21 ASSESSMENT — SOCIAL DETERMINANTS OF HEALTH (SDOH): HOW HARD IS IT FOR YOU TO PAY FOR THE VERY BASICS LIKE FOOD, HOUSING, MEDICAL CARE, AND HEATING?: NOT HARD AT ALL

## 2021-09-21 ASSESSMENT — ACTIVITIES OF DAILY LIVING (ADL): DEPENDENT_IADLS:: TRANSPORTATION

## 2021-09-21 NOTE — TELEPHONE ENCOUNTER
"TO PCP:     Pt called     has UTI symptoms - wife is on her way to drop of a sample already - notified him UA/UC has a standing order in his chart     States he can be considered for safer type of catheter - another type of intermittent catheter if sample shows positive culture result, plus a note documenting symptoms per Medicare (this may need to be documented in an OV)     Symptoms: sweating, autonomic dysreflexia, increased spasms     Currently is straight catheterizing and hoping he can do the \"safer\" intermittent catheter in the future     Is going to  his abx from [facility]     Stacy CASILLAS RN    "

## 2021-09-21 NOTE — PROGRESS NOTES
PAYTON HANSON spoke with colonoscopy scheduling regarding pt's need for admission prior to coloscopy for prep.  assured that pt will qualify for inpatient admission prior to colonoscopy. CC MARGIE will outreach to inform of this and encourage to call.    Clinic Care Coordination Contact    Clinic Care Coordination Contact  OUTREACH    Referral Information:  Referral Source: PCP    Primary Diagnosis: Psychosocial    Chief Complaint   Patient presents with     Clinic Care Coordination - Initial        Universal Utilization:   Clinic Utilization  Difficulty keeping appointments:: No  Compliance Concerns: No  No-Show Concerns: No  No PCP office visit in Past Year: No  Utilization    Hospital Admissions  0             ED Visits  0             No Show Count (past year)  0                Current as of: 9/21/2021  8:39 AM            Clinical Concerns:  PAYTON HANSON spoke with pt and his wife to relay the information that pt will be admitted prior to colonoscopy for prep. PAYTON HANSON provided phone number to call and schedule this.    Pt reported that he needs to have a note in his chart stating specific diagnoses in order for Medicare to pay for a changing out of his catheter. Pt will call to speak with triage about this to also support with UA scheduling.    Current Medical Concerns:  none    Current Behavioral Concerns: none    Education Provided to patient: CC role   Pain  Pain (GOAL):: No  Health Maintenance Reviewed: Up to date  Clinical Pathway: None    Medication Management:  Pt shared that he was provided a new rx for bactrim. Last evening, pt discovered that he is likely beginning a UTI. [facility] pharmacy informed pt that he is not able to receive this rx until 9/26. Pt has not filled this medication since December of last year. PAYTON HANSON encouraged pt to discuss this with pharmacist and if needed the pharmacist may need to outreach to clinic to discuss.     Functional Status:  Dependent ADLs:: Toileting  Dependent IADLs::  Transportation  Bed or wheelchair confined:: No  Mobility Status: Independent w/Device  Fallen 2 or more times in the past year?: No  Any fall with injury in the past year?: No    Living Situation:  Current living arrangement:: I live in a private home with spouse  Type of residence:: Private home - stairs    Lifestyle & Psychosocial Needs:    Social Determinants of Health     Tobacco Use: Low Risk      Smoking Tobacco Use: Never Smoker     Smokeless Tobacco Use: Never Used   Alcohol Use:      Frequency of Alcohol Consumption:      Average Number of Drinks:      Frequency of Binge Drinking:    Financial Resource Strain: Low Risk      Difficulty of Paying Living Expenses: Not hard at all   Food Insecurity: No Food Insecurity     Worried About Running Out of Food in the Last Year: Never true     Ran Out of Food in the Last Year: Never true   Transportation Needs: No Transportation Needs     Lack of Transportation (Medical): No     Lack of Transportation (Non-Medical): No   Physical Activity:      Days of Exercise per Week:      Minutes of Exercise per Session:    Stress:      Feeling of Stress :    Social Connections:      Frequency of Communication with Friends and Family:      Frequency of Social Gatherings with Friends and Family:      Attends [employer] Services:      Active Member of Clubs or Organizations:      Attends Club or Organization Meetings:      Marital Status:    Intimate Partner Violence:      Fear of Current or Ex-Partner:      Emotionally Abused:      Physically Abused:      Sexually Abused:    Depression: Not at risk     PHQ-2 Score: 0   Housing Stability: Unknown     Unable to Pay for Housing in the Last Year: No     Number of Places Lived in the Last Year: Not on file     Unstable Housing in the Last Year: No     Diet:: Regular  Inadequate nutrition (GOAL):: No  Tube Feeding: No  Inadequate activity/exercise (GOAL):: No  Significant changes in sleep pattern (GOAL): No        Presybeterian or spiritual  beliefs that impact treatment:: No  Mental health DX:: No  Mental health management concern (GOAL):: No  Chemical Dependency Status: No Current Concerns  Informal Support system:: Family, Spouse        Resources and Interventions:  Current Resources:    Community Resources: None  Supplies used at home:: Incontinence Supplies  Equipment Currently Used at Home: none  Employment Status: disabled         Advance Care Plan/Directive  Advanced Care Plans/Directives on file:: No    Referrals Placed: None     Patient/Caregiver understanding: Pt reports understanding and denies any additional questions or concerns at this times. SW CC engaged in AIDET communication during encounter.       Future Appointments              In 2 months Shawn Holcomb MD [facility],     In 6 months [facility] MD [facility]         Plan: At this time, pt denies outstanding need for connection or referral to resources or assistance navigating recommended follow up care. No further outreaches will be made at this time unless a new referral is made or a change in the pt's status occurs. Patient was provided with CC SW contact information and encouraged to call with any questions or concerns.    Dennise Sweet [facility]  Clinic Care Coordinator  [facility] Jordyn [place]  [facility]  [phone]  [email or web address]

## 2021-09-21 NOTE — LETTER
September 22, 2021      José Antonio Saha  [address]  [place] [address]        Dear ,    We are writing to inform you of your test results.    The following letter pertains to your most recent diagnostic tests:     The UA suggest a UTI.  I would advise starting the Bactrim.  We will follow up on the culture.       Resulted Orders   UA Macro with Reflex to Micro and Culture - lab collect   Result Value Ref Range    Color Urine Yellow Colorless, Straw, Light Yellow, Yellow    Appearance Urine Cloudy (A) Clear    Glucose Urine Negative Negative mg/dL    Bilirubin Urine Negative Negative    Ketones Urine Negative Negative mg/dL    Specific Gravity Urine >=1.030 1.003 - 1.035    Blood Urine Small (A) Negative    pH Urine 5.5 5.0 - 7.0    Protein Albumin Urine Negative Negative mg/dL    Urobilinogen Urine 0.2 0.2, 1.0 E.U./dL    Nitrite Urine Positive (A) Negative    Leukocyte Esterase Urine Moderate (A) Negative   Urine Microscopic Exam   Result Value Ref Range    Bacteria Urine Many (A) None Seen /HPF    RBC Urine 0-2 0-2 /HPF /HPF    WBC Urine  (A) 0-5 /HPF /HPF       If you have any questions or concerns, please call the clinic at the number listed above.       Sincerely,      Kirk Chavez MD

## 2021-09-21 NOTE — RESULT ENCOUNTER NOTE
The following letter pertains to your most recent diagnostic tests:    The UA suggest a UTI.  I would advise starting the Bactrim.  We will follow up on the culture.        Sincerely,    Dr. Chavez

## 2021-09-21 NOTE — LETTER
September 28, 2021      José Antonio Saha  [address]  [place] [address]              TO WHOM IT MAY CONCERN     Please note that José Antonio Saha had a urinalysis tested on September 21, 2021 for symptoms of sweating, autonomic dysreflexia, increase spasms and urinary incontinence.  The urine culture returned positive for urinary tract infection demonstrating greater than 100,000 colony-forming units of dysuria coli as well as ,000 colony-forming units of Klebsiella pneumonia.        Kirk Chavez MD

## 2021-09-21 NOTE — LETTER
[facility] CARE COORDINATION  [address] [place] [address]    September 21, 2021    José Antonio Saha  [address]  [place] [address]      Dear Tawanda,    I am a clinic care coordinator who works with Kirk Chavez MD at [facility]. I wanted to thank you for spending the time to talk with me.  Below is a description of clinic care coordination and how I can further assist you.      The clinic care coordination team is made up of a registered nurse,  and community health worker who understand the health care system. The goal of clinic care coordination is to help you manage your health and improve access to the health care system in the most efficient manner. The team can assist you in meeting your health care goals by providing education, coordinating services, strengthening the communication among your providers and supporting you with any resource needs.    Please feel free to contact me at [phone] with any questions or concerns. We are focused on providing you with the highest-quality healthcare experience possible and that all starts with you.     Sincerely,     SY Ch

## 2021-09-25 LAB
BACTERIA UR CULT: ABNORMAL
BACTERIA UR CULT: ABNORMAL

## 2021-09-27 ENCOUNTER — TELEPHONE (OUTPATIENT)
Dept: FAMILY MEDICINE | Facility: CLINIC | Age: 71
End: 2021-09-27

## 2021-09-27 NOTE — TELEPHONE ENCOUNTER
Please note that José Antonio Saha had a urinalysis tested on September 21, 2021 for symptoms of sweating, autonomic dysreflexia, increase spasms and urinary incontinence.  The urine culture returned positive for urinary tract infection demonstrating greater than 100,000 colony-forming units of dysuria coli as well as ,000 colony-forming units of Klebsiella pneumonia.      Kirk Chavez MD

## 2021-09-27 NOTE — RESULT ENCOUNTER NOTE
Please note that José Antonio Saha had a urinalysis tested on September 21, 2021 for symptoms of sweating, autonomic dysreflexia, increase spasms and urinary incontinence.  The urine culture returned positive for urinary tract infection demonstrating greater than 100,000 colony-forming units of dysuria coli as well as ,000 colony-forming units of Klebsiella pneumonia.      Sincerely,    Dr. Chavez

## 2021-09-27 NOTE — TELEPHONE ENCOUNTER
"PCP: patient states he can be considered for safer type of catheter - but needs documentation that shows positive culture result & a progress note in the chart stating symptoms, per Medicare. Please advise, patient agreeable to OV with you or same day provider. Patient does report that UTI symptoms \"are clearing up\".     Patient called back to follow up.   Taking antibiotic, things are clearing up.    Symptoms: sweating, autonomic dysreflexia, increased spasms and incontinence.     What they want is documentation that states I have a post manuel UTI by culture and progress note in my chart from my provider documenting symptoms.     Currently is straight catheterizing and hoping he can do the \"safer\" intermittent catheter in the future     Patient agreeable to appointment if needed with any provider.    Callback: [phone]- okay to leave detailed VM.     Jyothi Lynn RN  [facility]    "

## 2021-09-28 NOTE — TELEPHONE ENCOUNTER
Drafted a letter with below wording     Will need to follow up with patient to verify where he needs this sent     Stacy CASILLAS RN

## 2021-09-29 ENCOUNTER — PATIENT OUTREACH (OUTPATIENT)
Dept: NURSING | Facility: CLINIC | Age: 71
End: 2021-09-29
Payer: MEDICARE

## 2021-09-29 NOTE — PROGRESS NOTES
Clinic Care Coordination Contact    Follow Up Progress Note      Assessment: PAYTON HANSON received a call from pt shared that he has been trying to schedule his colonoscopy for the past week but he is still awaiting the call for this. He explained that the  stated that someone named Wilber is in charge of scheduling this for him due to needing the overnight stay prior to procedure. Pt has still not heard from Wilber and has called multiple times but isn't given any additional information.     PAYTON HANSON outreached to [facility]. They transferred to [facility] as his referral was transferred there per pt's request to have done with Dr. Marvin.    Wilber, Care Coordinator, will reach out to pt to get scheduling started within one week to get pt scheduled. If pt doesn't hear from him in 1 week he can call [phone].     Pt was informed of plan for scheduling and to await call from Wilber.    Care Gaps:    Health Maintenance Due   Topic Date Due     AORTIC ANEURYSM SCREENING (SYSTEM ASSIGNED)  Never done     COLORECTAL CANCER SCREENING  09/29/2021       Pt is currently working to schedule colorectal cander screening    Plan: At this time, pt denies outstanding need for connection or referral to resources or assistance navigating recommended follow up care. No further outreaches will be made at this time unless a new referral is made or a change in the pt's status occurs. Patient was provided with PAYTON HANSON contact information and encouraged to call with any questions or concerns.    Dennise Sweet, [facility]  Clinic Care Coordinator  [facility] Jordyn Wexford  [facility]  [phone]  [email or web address]

## 2021-09-29 NOTE — TELEPHONE ENCOUNTER
I signed the DME order   I added documentation of the UTI to my most recent F2F preventive exam with him  I signed an order for a FIT test   Can we mail him the kit?

## 2021-09-29 NOTE — TELEPHONE ENCOUNTER
Notified patient of below response from PCP     Faxed to name/number he provided (visit notes and DME order)   [facility]   Atten: Babita Lugo   Fax: [phone]    Lab - can you please mail a FIT sceen to patient?     Thank you   Stacy CASILLAS RN     Orthostatic BP done per orders. Documented in flowsheets. Will continue to monitor.

## 2021-09-29 NOTE — TELEPHONE ENCOUNTER
TO PCP:     Called patient to verify where he needed the letter sent     Per pt and his wife [facility] was supposed to call to give specifics on this but they never did     Per Medicare, needs documentation of the need of this in an office visit note rather than through a letter. This would require F2F documentation     Requesting:   Coloplast compact speedy cath set   Item number [id number]  #150/month     Asking if he can schedule a video visit with you for this, or does this need to go through urology?     Also - he called nahum cast and was notified [facility] stopped admitting patients for colonoscopies - pt cannot do the self prep at home so he is asking if you can order either a FIT screen or a Cologuard for him?     Stacy CASILLAS RN

## 2021-09-30 ENCOUNTER — MEDICAL CORRESPONDENCE (OUTPATIENT)
Dept: HEALTH INFORMATION MANAGEMENT | Facility: CLINIC | Age: 71
End: 2021-09-30
Payer: MEDICARE

## 2021-10-07 PROCEDURE — 82274 ASSAY TEST FOR BLOOD FECAL: CPT | Performed by: INTERNAL MEDICINE

## 2021-10-08 LAB — HEMOCCULT STL QL IA: NEGATIVE

## 2021-10-08 NOTE — RESULT ENCOUNTER NOTE
The following letter pertains to your most recent diagnostic tests:      Good news! Your stool test for colon cancer screening is negative.  This should be repeated in one year.            Sincerely,    Dr. Chavez

## 2021-11-01 ENCOUNTER — TRANSFERRED RECORDS (OUTPATIENT)
Dept: HEALTH INFORMATION MANAGEMENT | Facility: CLINIC | Age: 71
End: 2021-11-01
Payer: MEDICARE

## 2021-11-20 ENCOUNTER — OFFICE VISIT (OUTPATIENT)
Dept: URGENT CARE | Facility: URGENT CARE | Age: 71
End: 2021-11-20
Payer: MEDICARE

## 2021-11-20 DIAGNOSIS — N39.0 RECURRENT UTI: ICD-10-CM

## 2021-11-20 LAB
ALBUMIN UR-MCNC: 100 MG/DL
AMORPH CRY #/AREA URNS HPF: ABNORMAL /HPF
APPEARANCE UR: CLEAR
BACTERIA #/AREA URNS HPF: ABNORMAL /HPF
BILIRUB UR QL STRIP: NEGATIVE
COLOR UR AUTO: YELLOW
GLUCOSE UR STRIP-MCNC: NEGATIVE MG/DL
HGB UR QL STRIP: ABNORMAL
KETONES UR STRIP-MCNC: NEGATIVE MG/DL
LEUKOCYTE ESTERASE UR QL STRIP: ABNORMAL
NITRATE UR QL: NEGATIVE
PH UR STRIP: 6 [PH] (ref 5–7)
RBC #/AREA URNS AUTO: >100 /HPF
SP GR UR STRIP: >=1.03 (ref 1–1.03)
UROBILINOGEN UR STRIP-ACNC: 0.2 E.U./DL
WBC #/AREA URNS AUTO: >100 /HPF

## 2021-11-20 PROCEDURE — 81001 URINALYSIS AUTO W/SCOPE: CPT | Performed by: FAMILY MEDICINE

## 2021-11-20 PROCEDURE — 87086 URINE CULTURE/COLONY COUNT: CPT | Performed by: FAMILY MEDICINE

## 2021-11-20 PROCEDURE — 87186 SC STD MICRODIL/AGAR DIL: CPT | Performed by: FAMILY MEDICINE

## 2021-11-20 PROCEDURE — 99213 OFFICE O/P EST LOW 20 MIN: CPT | Performed by: FAMILY MEDICINE

## 2021-11-20 RX ORDER — SULFAMETHOXAZOLE/TRIMETHOPRIM 800-160 MG
1 TABLET ORAL 2 TIMES DAILY
Qty: 14 TABLET | Refills: 0 | COMMUNITY
Start: 2021-11-20 | End: 2022-02-04

## 2021-11-20 NOTE — LETTER
November 22, 2021      José Antonio Saha  [address]  [place] [address]        Dear ,    We are writing to inform you of your test results.    The following letter pertains to your most recent diagnostic tests:     It appears that you may have another urinary tract infection.  I believe you may still have some antibiotics for empiric therapy on hand.  Please let me know if you need additional antibiotics.  The urine culture is pending.         Resulted Orders   UA Macro with Reflex to Micro and Culture - lab collect   Result Value Ref Range    Color Urine Yellow Colorless, Straw, Light Yellow, Yellow    Appearance Urine Clear Clear    Glucose Urine Negative Negative mg/dL    Bilirubin Urine Negative Negative    Ketones Urine Negative Negative mg/dL    Specific Gravity Urine >=1.030 1.003 - 1.035    Blood Urine Large (A) Negative    pH Urine 6.0 5.0 - 7.0    Protein Albumin Urine 100  (A) Negative mg/dL    Urobilinogen Urine 0.2 0.2, 1.0 E.U./dL    Nitrite Urine Negative Negative    Leukocyte Esterase Urine Moderate (A) Negative   Urine Microscopic Exam   Result Value Ref Range    Bacteria Urine Many (A) None Seen /HPF    RBC Urine >100 (A) 0-2 /HPF /HPF    WBC Urine >100 (A) 0-5 /HPF /HPF    Amorphous Crystals Urine Many (A) None Seen /HPF       If you have any questions or concerns, please call the clinic at the number listed above.       Sincerely,      Kirk Chavez MD

## 2021-11-21 NOTE — PROGRESS NOTES
SUBJECTIVE: José Antonio Saha is a 71 year old male who  presents today for a possible UTI.   Symptoms of cloudy urine  Hematuria no.  still present    Past Medical History:   Diagnosis Date     Basal cell carcinoma      Malignant melanoma of other specified sites of skin 1980s    no recurrence     Personal history of other malignant neoplasm of skin 4/15/02    recurrent BCC scalp and face     Routine general medical examination at a health care facility 4/12/02     Scrotal varices     L scrotum     Allergies   Allergen Reactions     No Known Drug Allergies      Social History     Tobacco Use     Smoking status: Never Smoker     Smokeless tobacco: Never Used   Substance Use Topics     Alcohol use: Yes     Alcohol/week: 0.0 standard drinks     Comment: 2  drinks per week       ROS: CONSTITUTIONAL:NEGATIVE for fever, chills, change in weight    OBJECTIVE:  There were no vitals taken for this visit.  NAD  No Flank pain      ICD-10-CM    1. Recurrent UTI  N39.0 UA Macro with Reflex to Micro and Culture - lab collect     Urine Microscopic Exam     Urine Culture     sulfamethoxazole-trimethoprim (BACTRIM DS) 800-160 MG tablet       Drink plenty of fluids.  Prevention and treatment of UTI's discussed.Signs and symptoms of pyelonephritis mentioned.  Follow up with primary care physician if not improving

## 2021-11-22 NOTE — RESULT ENCOUNTER NOTE
The following letter pertains to your most recent diagnostic tests:    It appears that you may have another urinary tract infection.  I believe you may still have some antibiotics for empiric therapy on hand.  Please let me know if you need additional antibiotics.  The urine culture is pending.          Sincerely,    Dr. Chavez

## 2021-11-23 ENCOUNTER — TELEPHONE (OUTPATIENT)
Dept: FAMILY MEDICINE | Facility: CLINIC | Age: 71
End: 2021-11-23
Payer: MEDICARE

## 2021-11-23 NOTE — TELEPHONE ENCOUNTER
Since there is technically no UTI colony count, but given the observed pyuria and, I assume, new signs or symptoms prompting checking  I would recommend completing the course of bactrim

## 2021-11-23 NOTE — TELEPHONE ENCOUNTER
Dr. Chavez,     Pt calling about his UA/UC results.     11/20--Sat PM started treatment with Bactrim BID for 10 days.   Urine culture shows:      Pt wants to confirm you want him to continue taking the Bactrim BID for 10 days and complete entire course?

## 2021-11-25 LAB
BACTERIA UR CULT: ABNORMAL
BACTERIA UR CULT: ABNORMAL

## 2021-12-02 ENCOUNTER — OFFICE VISIT (OUTPATIENT)
Dept: DERMATOLOGY | Facility: CLINIC | Age: 71
End: 2021-12-02
Payer: MEDICARE

## 2021-12-02 VITALS — DIASTOLIC BLOOD PRESSURE: 82 MMHG | OXYGEN SATURATION: 97 % | SYSTOLIC BLOOD PRESSURE: 103 MMHG | HEART RATE: 67 BPM

## 2021-12-02 DIAGNOSIS — L81.4 LENTIGO: Primary | ICD-10-CM

## 2021-12-02 DIAGNOSIS — D23.9 DERMAL NEVUS: ICD-10-CM

## 2021-12-02 DIAGNOSIS — D18.01 ANGIOMA OF SKIN: ICD-10-CM

## 2021-12-02 DIAGNOSIS — C44.719 BASAL CELL CARCINOMA (BCC) OF LEFT LOWER LEG: ICD-10-CM

## 2021-12-02 DIAGNOSIS — C44.729 SQUAMOUS CELL CARCINOMA, LEG, LEFT: ICD-10-CM

## 2021-12-02 DIAGNOSIS — L82.1 SEBORRHEIC KERATOSIS: ICD-10-CM

## 2021-12-02 PROCEDURE — 17313 MOHS 1 STAGE T/A/L: CPT | Performed by: DERMATOLOGY

## 2021-12-02 PROCEDURE — 11102 TANGNTL BX SKIN SINGLE LES: CPT | Mod: 59 | Performed by: DERMATOLOGY

## 2021-12-02 PROCEDURE — 88331 PATH CONSLTJ SURG 1 BLK 1SPC: CPT | Mod: 59 | Performed by: DERMATOLOGY

## 2021-12-02 PROCEDURE — 99213 OFFICE O/P EST LOW 20 MIN: CPT | Mod: 25 | Performed by: DERMATOLOGY

## 2021-12-02 NOTE — PATIENT INSTRUCTIONS
Open Wound Care     for ______________    ? No strenuous activity for 48 hours    ? Take Tylenol as needed for discomfort.                                                .         ? Do not drink alcoholic beverages for 48 hours.    ? Keep the pressure bandage in place for 24 hours. If the bandage becomes blood tinged or loose, reinforce it with gauze and tape.        (Refer to the reverse side of this page for management of bleeding).    ? Remove bandage in 24 hours and begin wound care as follows:     1. Clean area with tap water using a Q tip or gauze pad, (shower / bathe normally)  2. Dry wound with Q tip or gauze pad  3. Apply Aquaphor, Vaseline, Polysporin or Bacitracin Ointment with a Q tip    Do NOT use Neosporin Ointment   4. Cover the wound with a band-aid or nonstick gauze pad and paper tape.  5. Repeat wound care once a day until wound is completely healed.    It is an old wives tale that a wound heals better when it is exposed to air and allowed to dry out. The wound will heal faster with a better cosmetic result if it is kept moist with ointment and covered with a bandage.  Do not let the wound dry out.    Supplies Needed:                Qtips or gauze pads                Vaseline, Aquaphor, Polysporin Ointment or Bacitracin Ointment (NOT NEOSPORIN)                Bandaids or nonstick gauze pads and paper tape    Wound care kits and brown paper tape are available for purchase at   the pharmacy.    BLEEDIN. Use tightly rolled up gauze or cloth to apply direct pressure over the bandage for 20   minutes.  2. Reapply pressure for an additional 20 minutes if necessary  3. Call the office or go to the nearest emergency room if pressure fails to stop the bleeding.  4. Use additional gauze and tape to maintain pressure once the bleeding has stopped.  5. Begin wound care 24 hours after surgery as directed.                WOUND HEALING    1. One week after surgery a pink / red halo will form around the  outside of the wound.   This is new skin.  2. The center of the wound will appear yellowish white and produce some drainage.  3. The pink halo will slowly migrate in toward the center of the wound until the wound is covered with new shiny pink skin.  4. There will be no more drainage when the wound is completely healed.  5. It will take six months to one year for the redness to fade.  6. The scar may be itchy, tight and sensitive to extreme temperatures for a year after the surgery.  7. Massaging the area several times a day for several minutes after the wound is completely healed will help the scar soften and normalize faster. Begin massage only after healing is complete.    In case of emergency call: Dr Holcomb: [phone]     [facility]: [phone]    [facility]: [phone]

## 2021-12-02 NOTE — PROGRESS NOTES
José Antonio Saha is an extremely pleasant 71 year old year old male patient here today for bleeding spot on lft shin.   .   Patient states this has been present for one year.  .  Patient reports the following symptoms:  Bleeding .  Patient reports the following previous treatments none.  These treatments did not work.  Patient reports the following modifying factors none.  Associated symptoms: none.  Patient has no other skin complaints today.  Remainder of the HPI, Meds, PMH, Allergies, FH, and SH was reviewed in chart.      Past Medical History:   Diagnosis Date     Basal cell carcinoma      Malignant melanoma of other specified sites of skin 1980s    no recurrence     Personal history of other malignant neoplasm of skin 4/15/02    recurrent BCC scalp and face     Routine general medical examination at a health care facility 4/12/02     Scrotal varices     L scrotum       Past Surgical History:   Procedure Laterality Date     COLONOSCOPY  11/19/2013    Procedure: COMBINED COLONOSCOPY, SINGLE BIOPSY/POLYPECTOMY BY BIOPSY;  COLONOSCOPY;  Surgeon: Adam Lopez MD;  Location:  GI     COLONOSCOPY N/A 9/29/2016    Procedure: COLONOSCOPY;  Surgeon: Paul Ken MD;  Location:  GI     [facility] NONSPECIFIC PROCEDURE  6/27/02    colonoscopy with polypectomy x3, fulguration x1     [facility] NONSPECIFIC PROCEDURE      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     Z NONSPECIFIC PROCEDURE      multiple skin Bxs     [facility] NONSPECIFIC PROCEDURE      tonsillectomy        Family History   Problem Relation Age of Onset     Cancer Mother         thyroid and lung     Prostate Cancer Father 65        uncle also had prostate Ca     Cerebrovascular Disease Father      Hypertension Father      Arthritis Daughter      Skin Cancer Daughter      Breast Cancer Sister      Cancer - colorectal No family hx of      Diabetes No family hx of        Social History     Socioeconomic History     Marital status:      Spouse name: Not on file      Number of children: 3     Years of education: Not on file     Highest education level: Not on file   Occupational History     Employer: [employer]   Tobacco Use     Smoking status: Never Smoker     Smokeless tobacco: Never Used   Substance and Sexual Activity     Alcohol use: Yes     Alcohol/week: 0.0 standard drinks     Comment: 2  drinks per week     Drug use: Not Currently     Sexual activity: Not on file   Other Topics Concern     Not on file   Social History Narrative     Not on file     Social Determinants of Health     Financial Resource Strain: Low Risk      Difficulty of Paying Living Expenses: Not hard at all   Food Insecurity: No Food Insecurity     Worried About Running Out of Food in the Last Year: Never true     Ran Out of Food in the Last Year: Never true   Transportation Needs: No Transportation Needs     Lack of Transportation (Medical): No     Lack of Transportation (Non-Medical): No   Physical Activity: Not on file   Stress: Not on file   Social Connections: Not on file   Intimate Partner Violence: Not on file   Housing Stability: Unknown     Unable to Pay for Housing in the Last Year: No     Number of Places Lived in the Last Year: Not on file     Unstable Housing in the Last Year: No       Outpatient Encounter Medications as of 12/2/2021   Medication Sig Dispense Refill     Acetaminophen (TYLENOL) 325 MG CAPS Take 325-650 mg by mouth as needed       baclofen (LIORESAL) 10 MG tablet >>>IF PUMP FAILS  Take 10 mg six times a day IF PUMP FAILS<<<       docusate sodium (ENEMEEZ) 283 MG enema Place 1 enema rectally daily 90 enema 3     Ethyl Alcohol, Skin Cleanser, 62 % LIQD Apply to hands as needed prior to self cath. 473 mL 1     saline 0.65 % (Soln) SOLN Spray in nostril as needed       senna (SENOKOT) 8.6 MG tablet Take 4 tablets by mouth daily       sertraline (ZOLOFT) 50 MG tablet Take 50 mg by mouth daily       sodium chloride (SHANNA 128) 5 % ophthalmic ointment 1  Application as needed for dry eyes       sulfamethoxazole-trimethoprim (BACTRIM DS) 800-160 MG tablet Take 1 tablet by mouth 2 times daily 14 tablet 0     sulfamethoxazole-trimethoprim (BACTRIM DS) 800-160 MG tablet Take 1 tablet by mouth 2 times daily 20 tablet 3     tolterodine (DETROL) 2 MG tablet Take 2 mg by mouth 2 times daily  180 tablet 3     UNABLE TO FIND MEDICATION NAME: Benesys Powder, 3 tsp per day       Vitamin D, Cholecalciferol, 25 MCG (1000 UT) CAPS Take by mouth daily        No facility-administered encounter medications on file as of 12/2/2021.             O:   NAD, WDWN, Alert & Oriented, Mood & Affect wnl, Vitals stable   Here today alone   /82   Pulse 67   SpO2 97%    General appearance normal   Vitals stable   Alert, oriented and in no acute distress      Following lymph nodes palpated: Occipital, Cervical, Supraclavicular no lad   L sears 1.1cm pink pearly papule      Stuck on papules and brown macules on trunk and ext   Red papules on trunk  Flesh colored papules on trunk     The remainder of the full exam was normal; the following areas were examined:  conjunctiva/lids, oral mucosa, neck, peripheral vascular system, abdomen, lymph nodes, digits/nails, eccrine and apocrine glands, scalp/hair, face, neck, chest, abdomen, buttocks, back, RUE, LUE, RLE, LLE       Eyes: Conjunctivae/lids:Normal     ENT: Lips, buccal mucosa, tongue: normal    MSK:Normal    Cardiovascular: peripheral edema none    Pulm: Breathing Normal    Lymph Nodes: No Head and Neck Lymphadenopathy     Neuro/Psych: Orientation:Alert and Orientedx3 ; Mood/Affect:normal       MICRO:   L shin:Orthokeratosis of epidermis with a proliferation of nests of basaloid cells, with peripheral palisading and a haphazard arrangement in the center extending into the dermis, forming nodules.  The tumor cells have hyperchromatic nuclei. Poor cytoplasm and intercellular bridging.  There is a proliferation of irregular nests of abnormal  squamous cells arising from the epidermis and invading the dermis.   A/P:  1. Seborrheic keratosis, lentigo, angioma, dermal nevus, hx of non-melanoma skin cancer   2. L shiin r/o basal cell carcinoma   TANGENTIAL BIOPSY IN HOUSE:  After consent, anesthesia with LEC and prep, tangential excision performed and dx above confirmed with frozen section histology.  No complications and routine wound care.  Patient is not on  anticoagulants and risk of bleeding discussed with patient.       I have personally reviewed all specimens and/or slides and used them with my medical judgement to determine or confirm the final diagnosis.     Patient told result basoscc.    It was a pleasure speaking to José Antonio Saha today.  Previous clinic notes and pertinent laboratory tests were reviewed prior to José Antonio Saha's visit.  Nature and genetics of benign skin lesions dicussed with patient.  Signs and Symptoms of skin cancer discussed with patient.  Patient encouraged to perform monthly skin exams.  UV precautions reviewed with patient.  Risks of non-melanoma skin cancer discussed with patient   Return to clinic 1 month    PROCEDURE NOTE  L shin baso squamous cell carcinoma   MOHS:   Location    The rationale for Mohs surgery was discussed with the patient and consent was obtained.  The risks and benefits as well as alternatives to therapy were discussed, in detail.  Specifically, the risks of infection, scarring, bleeding, prolonged wound healing, incomplete removal, allergy to anesthesia, nerve injury and recurrence were addressed.  Indication for Mohs was Location. Prior to the procedure, the treatment site was clearly identified and, if available, confirmed with previous photos and confirmed by the patient   All components of the Universal Protocol/PAUSE rule were completed.  The Mohs surgeon operated in two distinct and integrated capacities as the surgeon and pathologist.      The area was prepped with Betasept.  A rim of  normal appearing skin was marked circumferentially around the lesion.  The area was infiltrated with local anesthesia.  The tumor was first debulked to remove all clinically apparent tumor.  An incision following the standard Mohs approach was done and the specimen was oriented,mapped and placed in 1 block(s).  Each specimen was then chromacoded and processed in the Mohs laboratory using standard Mohs technique and submitted for frozen section histology.  Frozen section analysis showed no residual tumor but CLEAR MARGINS.      The tumor was excised using standard Mohs technique in 1 stages(s).  CLEAR MARGINS OBTAINED and Final defect size was 1.5 cm.     We discussed the options for wound management in full with the patient including risks/benefits/ possible outcomes.        REPAIR SECOND INTENT: We discussed the options for wound management in full with the patient including risks/benefits/possible outcomes. Decision made to allow the wound to heal by second intention. Cautery was used for for hemostasis. EBL minimal; complications none; wound care routine.  The patient was discharged in good condition and will return in one month or prn for wound evaluation.

## 2021-12-02 NOTE — LETTER
12/2/2021         RE: José Antonio Saha  [address]  [place] [facility] [address]        Dear Colleague,    Thank you for referring your patient, José Antonio Saha, to the [facility]. Please see a copy of my visit note below.    José Antonio Saha is an extremely pleasant 71 year old year old male patient here today for bleeding spot on lft shin.   .   Patient states this has been present for one year.  .  Patient reports the following symptoms:  Bleeding .  Patient reports the following previous treatments none.  These treatments did not work.  Patient reports the following modifying factors none.  Associated symptoms: none.  Patient has no other skin complaints today.  Remainder of the HPI, Meds, PMH, Allergies, FH, and SH was reviewed in chart.      Past Medical History:   Diagnosis Date     Basal cell carcinoma      Malignant melanoma of other specified sites of skin 1980s    no recurrence     Personal history of other malignant neoplasm of skin 4/15/02    recurrent BCC scalp and face     Routine general medical examination at a [facility] care facility 4/12/02     Scrotal varices     L scrotum       Past Surgical History:   Procedure Laterality Date     COLONOSCOPY  11/19/2013    Procedure: COMBINED COLONOSCOPY, SINGLE BIOPSY/POLYPECTOMY BY BIOPSY;  COLONOSCOPY;  Surgeon: Adam Lopez MD;  Location:  GI     COLONOSCOPY N/A 9/29/2016    Procedure: COLONOSCOPY;  Surgeon: Paul Ken MD;  Location:  GI     [facility] NONSPECIFIC PROCEDURE  6/27/02    colonoscopy with polypectomy x3, fulguration x1     Z NONSPECIFIC PROCEDURE      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     Z NONSPECIFIC PROCEDURE      multiple skin Bxs     [facility] NONSPECIFIC PROCEDURE      tonsillectomy        Family History   Problem Relation Age of Onset     Cancer Mother         thyroid and lung     Prostate Cancer Father 65        uncle also had prostate Ca     Cerebrovascular Disease Father       Hypertension Father      Arthritis Daughter      Skin Cancer Daughter      Breast Cancer Sister      Cancer - colorectal No family hx of      Diabetes No family hx of        Social History     Socioeconomic History     Marital status:      Spouse name: Not on file     Number of children: 3     Years of education: Not on file     Highest education level: Not on file   Occupational History     Employer: [employer]   Tobacco Use     Smoking status: Never Smoker     Smokeless tobacco: Never Used   Substance and Sexual Activity     Alcohol use: Yes     Alcohol/week: 0.0 standard drinks     Comment: 2  drinks per week     Drug use: Not Currently     Sexual activity: Not on file   Other Topics Concern     Not on file   Social History Narrative     Not on file     Social Determinants of Health     Financial Resource Strain: Low Risk      Difficulty of Paying Living Expenses: Not hard at all   Food Insecurity: No Food Insecurity     Worried About Running Out of Food in the Last Year: Never true     Ran Out of Food in the Last Year: Never true   Transportation Needs: No Transportation Needs     Lack of Transportation (Medical): No     Lack of Transportation (Non-Medical): No   Physical Activity: Not on file   Stress: Not on file   Social Connections: Not on file   Intimate Partner Violence: Not on file   Housing Stability: Unknown     Unable to Pay for Housing in the Last Year: No     Number of Places Lived in the Last Year: Not on file     Unstable Housing in the Last Year: No       Outpatient Encounter Medications as of 12/2/2021   Medication Sig Dispense Refill     Acetaminophen (TYLENOL) 325 MG CAPS Take 325-650 mg by mouth as needed       baclofen (LIORESAL) 10 MG tablet >>>IF PUMP FAILS  Take 10 mg six times a day IF PUMP FAILS<<<       docusate sodium (ENEMEEZ) 283 MG enema Place 1 enema rectally daily 90 enema 3     Ethyl Alcohol, Skin Cleanser, 62 % LIQD Apply to hands as needed prior to self  cath. 473 mL 1     saline 0.65 % (Soln) SOLN Spray in nostril as needed       senna (SENOKOT) 8.6 MG tablet Take 4 tablets by mouth daily       sertraline (ZOLOFT) 50 MG tablet Take 50 mg by mouth daily       sodium chloride (SHANNA 128) 5 % ophthalmic ointment 1 Application as needed for dry eyes       sulfamethoxazole-trimethoprim (BACTRIM DS) 800-160 MG tablet Take 1 tablet by mouth 2 times daily 14 tablet 0     sulfamethoxazole-trimethoprim (BACTRIM DS) 800-160 MG tablet Take 1 tablet by mouth 2 times daily 20 tablet 3     tolterodine (DETROL) 2 MG tablet Take 2 mg by mouth 2 times daily  180 tablet 3     UNABLE TO FIND MEDICATION NAME: Benesys Powder, 3 tsp per day       Vitamin D, Cholecalciferol, 25 MCG (1000 UT) CAPS Take by mouth daily        No facility-administered encounter medications on file as of 12/2/2021.             O:   NAD, WDWN, Alert & Oriented, Mood & Affect wnl, Vitals stable   Here today alone   /82   Pulse 67   SpO2 97%    General appearance normal   Vitals stable   Alert, oriented and in no acute distress      Following lymph nodes palpated: Occipital, Cervical, Supraclavicular no lad   L sears 1.1cm pink pearly papule      Stuck on papules and brown macules on trunk and ext   Red papules on trunk  Flesh colored papules on trunk     The remainder of the full exam was normal; the following areas were examined:  conjunctiva/lids, oral mucosa, neck, peripheral vascular system, abdomen, lymph nodes, digits/nails, eccrine and apocrine glands, scalp/hair, face, neck, chest, abdomen, buttocks, back, RUE, LUE, RLE, LLE       Eyes: Conjunctivae/lids:Normal     ENT: Lips, buccal mucosa, tongue: normal    MSK:Normal    Cardiovascular: peripheral edema none    Pulm: Breathing Normal    Lymph Nodes: No Head and Neck Lymphadenopathy     Neuro/Psych: Orientation:Alert and Orientedx3 ; Mood/Affect:normal       MICRO:   L shin:Orthokeratosis of epidermis with a proliferation of nests of basaloid  cells, with peripheral palisading and a haphazard arrangement in the center extending into the dermis, forming nodules.  The tumor cells have hyperchromatic nuclei. Poor cytoplasm and intercellular bridging.  There is a proliferation of irregular nests of abnormal squamous cells arising from the epidermis and invading the dermis.   A/P:  1. Seborrheic keratosis, lentigo, angioma, dermal nevus, hx of non-melanoma skin cancer   2. L shiin r/o basal cell carcinoma   TANGENTIAL BIOPSY IN HOUSE:  After consent, anesthesia with LEC and prep, tangential excision performed and dx above confirmed with frozen section histology.  No complications and routine wound care.  Patient is not on  anticoagulants and risk of bleeding discussed with patient.       I have personally reviewed all specimens and/or slides and used them with my medical judgement to determine or confirm the final diagnosis.     Patient told result basoscc.    It was a pleasure speaking to José Antonio Saha today.  Previous clinic notes and pertinent laboratory tests were reviewed prior to José Antonio Saha's visit.  Nature and genetics of benign skin lesions dicussed with patient.  Signs and Symptoms of skin cancer discussed with patient.  Patient encouraged to perform monthly skin exams.  UV precautions reviewed with patient.  Risks of non-melanoma skin cancer discussed with patient   Return to clinic 1 month    PROCEDURE NOTE  L shin baso squamous cell carcinoma   MOHS:   Location    The rationale for Mohs surgery was discussed with the patient and consent was obtained.  The risks and benefits as well as alternatives to therapy were discussed, in detail.  Specifically, the risks of infection, scarring, bleeding, prolonged wound healing, incomplete removal, allergy to anesthesia, nerve injury and recurrence were addressed.  Indication for Mohs was Location. Prior to the procedure, the treatment site was clearly identified and, if available, confirmed with  previous photos and confirmed by the patient   All components of the Universal Protocol/PAUSE rule were completed.  The Mohs surgeon operated in two distinct and integrated capacities as the surgeon and pathologist.      The area was prepped with Betasept.  A rim of normal appearing skin was marked circumferentially around the lesion.  The area was infiltrated with local anesthesia.  The tumor was first debulked to remove all clinically apparent tumor.  An incision following the standard Mohs approach was done and the specimen was oriented,mapped and placed in 1 block(s).  Each specimen was then chromacoded and processed in the Mohs laboratory using standard Mohs technique and submitted for frozen section histology.  Frozen section analysis showed no residual tumor but CLEAR MARGINS.      The tumor was excised using standard Mohs technique in 1 stages(s).  CLEAR MARGINS OBTAINED and Final defect size was 1.5 cm.     We discussed the options for wound management in full with the patient including risks/benefits/ possible outcomes.        REPAIR SECOND INTENT: We discussed the options for wound management in full with the patient including risks/benefits/possible outcomes. Decision made to allow the wound to heal by second intention. Cautery was used for for hemostasis. EBL minimal; complications none; wound care routine.  The patient was discharged in good condition and will return in one month or prn for wound evaluation.        Again, thank you for allowing me to participate in the care of your patient.        Sincerely,        Shawn Holcomb MD

## 2021-12-04 ENCOUNTER — MEDICAL CORRESPONDENCE (OUTPATIENT)
Dept: HEALTH INFORMATION MANAGEMENT | Facility: CLINIC | Age: 71
End: 2021-12-04
Payer: MEDICARE

## 2021-12-22 ENCOUNTER — TELEPHONE (OUTPATIENT)
Dept: GASTROENTEROLOGY | Facility: CLINIC | Age: 71
End: 2021-12-22
Payer: MEDICARE

## 2021-12-22 NOTE — TELEPHONE ENCOUNTER
Called patient to schedule appointment for Colonoscopy, left message with call back details. Third attempt. CTL.

## 2022-01-06 ENCOUNTER — OFFICE VISIT (OUTPATIENT)
Dept: FAMILY MEDICINE | Facility: CLINIC | Age: 72
End: 2022-01-06
Payer: MEDICARE

## 2022-01-06 VITALS
HEIGHT: 72 IN | HEART RATE: 74 BPM | TEMPERATURE: 96.9 F | SYSTOLIC BLOOD PRESSURE: 98 MMHG | DIASTOLIC BLOOD PRESSURE: 64 MMHG | WEIGHT: 165 LBS | OXYGEN SATURATION: 95 % | RESPIRATION RATE: 16 BRPM | BODY MASS INDEX: 22.35 KG/M2

## 2022-01-06 DIAGNOSIS — G82.50 CHRONIC INCOMPLETE SPASTIC TETRAPLEGIA (H): ICD-10-CM

## 2022-01-06 DIAGNOSIS — C61 PROSTATE CANCER (H): ICD-10-CM

## 2022-01-06 DIAGNOSIS — N31.9 NEUROGENIC BLADDER: ICD-10-CM

## 2022-01-06 DIAGNOSIS — Z01.818 PREOP GENERAL PHYSICAL EXAM: Primary | ICD-10-CM

## 2022-01-06 PROCEDURE — 99214 OFFICE O/P EST MOD 30 MIN: CPT | Performed by: INTERNAL MEDICINE

## 2022-01-06 ASSESSMENT — MIFFLIN-ST. JEOR: SCORE: 1546.44
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
## 2022-01-13 ENCOUNTER — OFFICE VISIT (OUTPATIENT)
Dept: DERMATOLOGY | Facility: CLINIC | Age: 72
End: 2022-01-13
Payer: MEDICARE

## 2022-01-13 VITALS — DIASTOLIC BLOOD PRESSURE: 62 MMHG | SYSTOLIC BLOOD PRESSURE: 95 MMHG | HEART RATE: 63 BPM | OXYGEN SATURATION: 96 %

## 2022-01-13 DIAGNOSIS — L81.4 LENTIGO: ICD-10-CM

## 2022-01-13 DIAGNOSIS — B35.1 ONYCHOMYCOSIS: ICD-10-CM

## 2022-01-13 DIAGNOSIS — Z85.828 HISTORY OF SKIN CANCER: Primary | ICD-10-CM

## 2022-01-13 DIAGNOSIS — L57.0 AK (ACTINIC KERATOSIS): ICD-10-CM

## 2022-01-13 DIAGNOSIS — L82.1 SEBORRHEIC KERATOSIS: ICD-10-CM

## 2022-01-13 PROCEDURE — 99213 OFFICE O/P EST LOW 20 MIN: CPT | Mod: 25 | Performed by: DERMATOLOGY

## 2022-01-13 PROCEDURE — 17003 DESTRUCT PREMALG LES 2-14: CPT | Performed by: DERMATOLOGY

## 2022-01-13 PROCEDURE — 17000 DESTRUCT PREMALG LESION: CPT | Performed by: DERMATOLOGY

## 2022-01-13 NOTE — LETTER
1/13/2022         RE: José Antonio Saha  [address]  [place] [address]        Dear Colleague,    Thank you for referring your patient, José Antonio Saha, to the [facility]. Please see a copy of my visit note below.    José Antonio Saha is an extremely pleasant 72 year old year old male patient here today for wound check left leg all healed!  He notes spot on right leg x2 and spot on left 3rd toenail.  Patient has no other skin complaints today.  Remainder of the HPI, Meds, PMH, Allergies, FH, and SH was reviewed in chart.      Past Medical History:   Diagnosis Date     Basal cell carcinoma      Malignant melanoma of other specified sites of skin 1980s    no recurrence     Personal history of other malignant neoplasm of skin 4/15/02    recurrent BCC scalp and face     Routine general medical examination at a [facility] care facility 4/12/02     Scrotal varices     L scrotum       Past Surgical History:   Procedure Laterality Date     COLONOSCOPY  11/19/2013    Procedure: COMBINED COLONOSCOPY, SINGLE BIOPSY/POLYPECTOMY BY BIOPSY;  COLONOSCOPY;  Surgeon: Adam Lopez MD;  Location:  GI     COLONOSCOPY N/A 9/29/2016    Procedure: COLONOSCOPY;  Surgeon: Paul Ken MD;  Location:  GI     [facility] NONSPECIFIC PROCEDURE  6/27/02    colonoscopy with polypectomy x3, fulguration x1     [facility] NONSPECIFIC PROCEDURE      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     [facility] NONSPECIFIC PROCEDURE      multiple skin Bxs     [facility] NONSPECIFIC PROCEDURE      tonsillectomy        Family History   Problem Relation Age of Onset     Cancer Mother         thyroid and lung     Prostate Cancer Father 65        uncle also had prostate Ca     Cerebrovascular Disease Father      Hypertension Father      Arthritis Daughter      Skin Cancer Daughter      Breast Cancer Sister      Cancer - colorectal No family hx of      Diabetes No family hx of        Social History     Socioeconomic History      Marital status:      Spouse name: Not on file     Number of children: 3     Years of education: Not on file     Highest education level: Not on file   Occupational History     Employer: [employer]   Tobacco Use     Smoking status: Never Smoker     Smokeless tobacco: Never Used   Substance and Sexual Activity     Alcohol use: Yes     Alcohol/week: 0.0 standard drinks     Comment: 2  drinks per week     Drug use: Not Currently     Sexual activity: Not on file   Other Topics Concern     Not on file   Social History Narrative     Not on file     Social Determinants of Health     Financial Resource Strain: Low Risk      Difficulty of Paying Living Expenses: Not hard at all   Food Insecurity: No Food Insecurity     Worried About Running Out of Food in the Last Year: Never true     Ran Out of Food in the Last Year: Never true   Transportation Needs: No Transportation Needs     Lack of Transportation (Medical): No     Lack of Transportation (Non-Medical): No   Physical Activity: Not on file   Stress: Not on file   Social Connections: Not on file   Intimate Partner Violence: Not on file   Housing Stability: Unknown     Unable to Pay for Housing in the Last Year: No     Number of Places Lived in the Last Year: Not on file     Unstable Housing in the Last Year: No       Outpatient Encounter Medications as of 1/13/2022   Medication Sig Dispense Refill     Acetaminophen (TYLENOL) 325 MG CAPS Take 325-650 mg by mouth as needed       baclofen (LIORESAL) 10 MG tablet >>>IF PUMP FAILS  Take 10 mg six times a day IF PUMP FAILS<<<       docusate sodium (ENEMEEZ) 283 MG enema Place 1 enema rectally daily 90 enema 3     Ethyl Alcohol, Skin Cleanser, 62 % LIQD Apply to hands as needed prior to self cath. 473 mL 1     saline 0.65 % (Soln) SOLN Spray in nostril as needed       senna (SENOKOT) 8.6 MG tablet Take 4 tablets by mouth daily       sertraline (ZOLOFT) 50 MG tablet Take 50 mg by mouth daily       sodium  chloride (SHANNA 128) 5 % ophthalmic ointment 1 Application as needed for dry eyes       sulfamethoxazole-trimethoprim (BACTRIM DS) 800-160 MG tablet Take 1 tablet by mouth 2 times daily 14 tablet 0     UNABLE TO FIND MEDICATION NAME: Katerine Powder, 3 tsp per day       Vitamin D, Cholecalciferol, 25 MCG (1000 UT) CAPS Take by mouth daily        No facility-administered encounter medications on file as of 1/13/2022.             O:   NAD, WDWN, Alert & Oriented, Mood & Affect wnl, Vitals stable   Here today with wif e     General appearance normal   Vitals stable   Alert, oriented and in no acute distress      L leg all healed  L toenail 3rd onychomycosis   R lower leg two gritty scaly papule   Stuck on papules and brown macules on trunk and ext     Eyes: Conjunctivae/lids:Normal     ENT: Lips, buccal mucosa, tongue: normal    MSK:Normal    Cardiovascular: peripheral edema none    Pulm: Breathing Normal    Neuro/Psych: Orientation:Alert and Orientedx3 ; Mood/Affect:normal       A/P:  1. Seborrheic keratosis, lentigo,   2. Hx of non-melanoma skin cancer all healed  3. Actinic keratosis x2  LN2:  Treated with LN2 for 5s for 1-2 cycles. Warned risks of blistering, pain, pigment change, scarring, and incomplete resolution.  Advised patient to return if lesions do not completely resolve.  Wound care sheet given.  4. Onychomycosis  Avulsion, orals and topicals discussed with patient   He will just watch   It was a pleasure speaking to José Antonio Saha today.  Previous clinic notes and pertinent laboratory tests were reviewed prior to José Antonio Saha's visit.  Nature and genetics of benign skin lesions dicussed with patient.  Signs and Symptoms of skin cancer discussed with patient.  Patient encouraged to perform monthly skin exams.  UV precautions reviewed with patient.  Risks of non-melanoma skin cancer discussed with patient   Return to clinic 6 months        Again, thank you for allowing me to participate in the care  of your patient.        Sincerely,        Shawn Holcomb MD

## 2022-01-13 NOTE — PROGRESS NOTES
Jos éAntonio Saha is an extremely pleasant 72 year old year old male patient here today for wound check left leg all healed!  He notes spot on right leg x2 and spot on left 3rd toenail.  Patient has no other skin complaints today.  Remainder of the HPI, Meds, PMH, Allergies, FH, and SH was reviewed in chart.      Past Medical History:   Diagnosis Date     Basal cell carcinoma      Malignant melanoma of other specified sites of skin 1980s    no recurrence     Personal history of other malignant neoplasm of skin 4/15/02    recurrent BCC scalp and face     Routine general medical examination at a health care facility 4/12/02     Scrotal varices     L scrotum       Past Surgical History:   Procedure Laterality Date     COLONOSCOPY  11/19/2013    Procedure: COMBINED COLONOSCOPY, SINGLE BIOPSY/POLYPECTOMY BY BIOPSY;  COLONOSCOPY;  Surgeon: Adam Lopez MD;  Location:  GI     COLONOSCOPY N/A 9/29/2016    Procedure: COLONOSCOPY;  Surgeon: Paul Ken MD;  Location:  GI     [facility] NONSPECIFIC PROCEDURE  6/27/02    colonoscopy with polypectomy x3, fulguration x1     [facility] NONSPECIFIC PROCEDURE      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     [facility] NONSPECIFIC PROCEDURE      multiple skin Bxs     [facility] NONSPECIFIC PROCEDURE      tonsillectomy        Family History   Problem Relation Age of Onset     Cancer Mother         thyroid and lung     Prostate Cancer Father 65        uncle also had prostate Ca     Cerebrovascular Disease Father      Hypertension Father      Arthritis Daughter      Skin Cancer Daughter      Breast Cancer Sister      Cancer - colorectal No family hx of      Diabetes No family hx of        Social History     Socioeconomic History     Marital status:      Spouse name: Not on file     Number of children: 3     Years of education: Not on file     Highest education level: Not on file   Occupational History     Employer: [employer]   Tobacco Use     Smoking status: Never Smoker      Smokeless tobacco: Never Used   Substance and Sexual Activity     Alcohol use: Yes     Alcohol/week: 0.0 standard drinks     Comment: 2  drinks per week     Drug use: Not Currently     Sexual activity: Not on file   Other Topics Concern     Not on file   Social History Narrative     Not on file     Social Determinants of Health     Financial Resource Strain: Low Risk      Difficulty of Paying Living Expenses: Not hard at all   Food Insecurity: No Food Insecurity     Worried About Running Out of Food in the Last Year: Never true     Ran Out of Food in the Last Year: Never true   Transportation Needs: No Transportation Needs     Lack of Transportation (Medical): No     Lack of Transportation (Non-Medical): No   Physical Activity: Not on file   Stress: Not on file   Social Connections: Not on file   Intimate Partner Violence: Not on file   Housing Stability: Unknown     Unable to Pay for Housing in the Last Year: No     Number of Places Lived in the Last Year: Not on file     Unstable Housing in the Last Year: No       Outpatient Encounter Medications as of 1/13/2022   Medication Sig Dispense Refill     Acetaminophen (TYLENOL) 325 MG CAPS Take 325-650 mg by mouth as needed       baclofen (LIORESAL) 10 MG tablet >>>IF PUMP FAILS  Take 10 mg six times a day IF PUMP FAILS<<<       docusate sodium (ENEMEEZ) 283 MG enema Place 1 enema rectally daily 90 enema 3     Ethyl Alcohol, Skin Cleanser, 62 % LIQD Apply to hands as needed prior to self cath. 473 mL 1     saline 0.65 % (Soln) SOLN Spray in nostril as needed       senna (SENOKOT) 8.6 MG tablet Take 4 tablets by mouth daily       sertraline (ZOLOFT) 50 MG tablet Take 50 mg by mouth daily       sodium chloride (SHANNA 128) 5 % ophthalmic ointment 1 Application as needed for dry eyes       sulfamethoxazole-trimethoprim (BACTRIM DS) 800-160 MG tablet Take 1 tablet by mouth 2 times daily 14 tablet 0     UNABLE TO FIND MEDICATION NAME: Benesys Powder, 3 tsp per day        Vitamin D, Cholecalciferol, 25 MCG (1000 UT) CAPS Take by mouth daily        No facility-administered encounter medications on file as of 1/13/2022.             O:   NAD, WDWN, Alert & Oriented, Mood & Affect wnl, Vitals stable   Here today with wif e     General appearance normal   Vitals stable   Alert, oriented and in no acute distress      L leg all healed  L toenail 3rd onychomycosis   R lower leg two gritty scaly papule   Stuck on papules and brown macules on trunk and ext     Eyes: Conjunctivae/lids:Normal     ENT: Lips, buccal mucosa, tongue: normal    MSK:Normal    Cardiovascular: peripheral edema none    Pulm: Breathing Normal    Neuro/Psych: Orientation:Alert and Orientedx3 ; Mood/Affect:normal       A/P:  1. Seborrheic keratosis, lentigo,   2. Hx of non-melanoma skin cancer all healed  3. Actinic keratosis x2  LN2:  Treated with LN2 for 5s for 1-2 cycles. Warned risks of blistering, pain, pigment change, scarring, and incomplete resolution.  Advised patient to return if lesions do not completely resolve.  Wound care sheet given.  4. Onychomycosis  Avulsion, orals and topicals discussed with patient   He will just watch   It was a pleasure speaking to José Antonio Saha today.  Previous clinic notes and pertinent laboratory tests were reviewed prior to José Antonio Saha's visit.  Nature and genetics of benign skin lesions dicussed with patient.  Signs and Symptoms of skin cancer discussed with patient.  Patient encouraged to perform monthly skin exams.  UV precautions reviewed with patient.  Risks of non-melanoma skin cancer discussed with patient   Return to clinic 6 months

## 2022-01-13 NOTE — NURSING NOTE
Initial BP 95/62   Pulse 63   SpO2 96%  Estimated body mass index is 22.38 kg/m  as calculated from the following:    Height as of 1/6/22: 1.829 m (6').    Weight as of 1/6/22: 74.8 kg (165 lb).     RITU Bentley-BSN-N  [facility]  [phone]

## 2022-02-04 DIAGNOSIS — N39.0 RECURRENT UTI: ICD-10-CM

## 2022-02-04 RX ORDER — SULFAMETHOXAZOLE/TRIMETHOPRIM 800-160 MG
TABLET ORAL
Qty: 40 TABLET | Refills: 2 | Status: SHIPPED | OUTPATIENT
Start: 2022-02-04 | End: 2022-06-20

## 2022-02-04 NOTE — TELEPHONE ENCOUNTER
Routing refill request to provider for review/approval because:  Medication is reported/historical    Leatha Caballero RN

## 2022-03-10 ENCOUNTER — OFFICE VISIT (OUTPATIENT)
Dept: FAMILY MEDICINE | Facility: CLINIC | Age: 72
End: 2022-03-10
Payer: MEDICARE

## 2022-03-10 VITALS
HEART RATE: 72 BPM | SYSTOLIC BLOOD PRESSURE: 96 MMHG | BODY MASS INDEX: 22.38 KG/M2 | WEIGHT: 165 LBS | OXYGEN SATURATION: 96 % | TEMPERATURE: 98 F | DIASTOLIC BLOOD PRESSURE: 61 MMHG | RESPIRATION RATE: 16 BRPM

## 2022-03-10 DIAGNOSIS — Z01.818 PREOP GENERAL PHYSICAL EXAM: Primary | ICD-10-CM

## 2022-03-10 DIAGNOSIS — G56.22 ULNAR NEUROPATHY OF LEFT UPPER EXTREMITY: ICD-10-CM

## 2022-03-10 PROCEDURE — 99213 OFFICE O/P EST LOW 20 MIN: CPT | Performed by: NURSE PRACTITIONER

## 2022-03-10 ASSESSMENT — PAIN SCALES - GENERAL: PAINLEVEL: NO PAIN (0)

## 2022-03-10 NOTE — PATIENT INSTRUCTIONS
Preparing for Your Surgery  Getting started  A nurse will call you to review your health history and instructions. They will give you an arrival time based on your scheduled surgery time. Please be ready to share:    Your doctor's clinic name and phone number    Your medical, surgical and anesthesia history    A list of allergies and sensitivities    A list of medicines, including herbal treatments and over-the-counter drugs    Whether the patient has a legal guardian (ask how to send us the papers in advance)  Please tell us if you're pregnant--or if there's any chance you might be pregnant. Some surgeries may injure a fetus (unborn baby), so they require a pregnancy test. Surgeries that are safe for a fetus don't always need a test, and you can choose whether to have one.   If you have a child who's having surgery, please ask for a copy of Preparing for Your Child's Surgery.    Preparing for surgery    Within 30 days of surgery: Have a pre-op exam (sometimes called an H&P, or History and Physical). This can be done at a clinic or pre-operative center.  ? If you're having a , you may not need this exam. Talk to your care team.    At your pre-op exam, talk to your care team about all medicines you take. If you need to stop any medicines before surgery, ask when to start taking them again.  ? We do this for your safety. Many medicines can make you bleed too much during surgery. Some change how well surgery (anesthesia) drugs work.    Call your insurance company to let them know you're having surgery. (If you don't have insurance, call [phone].)    Call your clinic if there's any change in your health. This includes signs of a cold or flu (sore throat, runny nose, cough, rash, fever). It also includes a scrape or scratch near the surgery site.    If you have questions on the day of surgery, call your hospital or surgery center.  COVID testing  You may need to be tested for COVID-19 before having  surgery. If so, your surgical team will give you instructions for scheduling this test, separate from your preoperative history and physical.  Eating and drinking guidelines  For your safety: Unless your surgeon tells you otherwise, follow the guidelines below.    Eat and drink as usual until 8 hours before surgery. After that, no food or milk.    Drink clear liquids until 2 hours before surgery. These are liquids you can see through, like water, Gatorade and Propel Water. You may also have black coffee and tea (no cream or milk).    Nothing by mouth within 2 hours of surgery. This includes gum, candy and breath mints.    If you drink alcohol: Stop drinking it the night before surgery.    If your care team tells you to take medicine on the morning of surgery, it's okay to take it with a sip of water.  Preventing infection    Shower or bathe the night before and morning of your surgery. Follow the instructions your clinic gave you. (If no instructions, use regular soap.)    Don't shave or clip hair near your surgery site. We'll remove the hair if needed.    Don't smoke or vape the morning of surgery. You may chew nicotine gum up to 2 hours before surgery. A nicotine patch is okay.  ? Note: Some surgeries require you to completely quit smoking and nicotine. Check with your surgeon.    Your care team will make every effort to keep you safe from infection. We will:  ? Clean our hands often with soap and water (or an alcohol-based hand rub).  ? Clean the skin at your surgery site with a special soap that kills germs.  ? Give you a special gown to keep you warm. (Cold raises the risk of infection.)  ? Wear special hair covers, masks, gowns and gloves during surgery.  ? Give antibiotic medicine, if prescribed. Not all surgeries need antibiotics.  What to bring on the day of surgery    Photo ID and insurance card    Copy of your health care directive, if you have one    Glasses and hearing aides (bring cases)  ? You can't  wear contacts during surgery    Inhaler and eye drops, if you use them (tell us about these when you arrive)    CPAP machine or breathing device, if you use them    A few personal items, if spending the night    If you have . . .  ? A pacemaker, ICD (cardiac defibrillator) or other implant: Bring the ID card.  ? An implanted stimulator: Bring the remote control.  ? A legal guardian: Bring a copy of the certified (court-stamped) guardianship papers.  Please remove any jewelry, including body piercings. Leave jewelry and other valuables at home.  If you're going home the day of surgery    You must have a responsible adult drive you home. They should stay with you overnight as well.    If you don't have someone to stay with you, and you aren't safe to go home alone, we may keep you overnight. Insurance often won't pay for this.  After surgery  If it's hard to control your pain or you need more pain medicine, please call your surgeon's office.  Questions?   If you have any questions for your care team, list them here: _________________________________________________________________________________________________________________________________________________________________________ ____________________________________ ____________________________________ ____________________________________  For informational purposes only. Not to replace the advice of your health care provider. Copyright   2003, 2019 [facility]. All rights reserved. Clinically reviewed by Salud Acosta MD. [employer] [id number] - REV 07/21.    Before Your Procedure or Hospital Admission  Testing for COVID-19 (Coronavirus)  Thank you for choosing [facility] for your health care needs. The COVID-19 pandemic is a very challenging time for everyone.   Our goal is to keep you and our team here at [facility] safe and healthy. We've taken many steps to make this happen. For example:    We test and screen our staff, care teams and  "patients for COVID-19.    Everyone at [facility] must wear a mask and stay 6 feet apart.    We are limiting hospital and clinic visitors.  Before you come in  If you test COVID-19 positive with any kind of test before your surgery date, call your surgeon's office. We need to know the date of your positive test. We may need to re-schedule your surgery.  Unless you've had a positive COVID-19 test within the past 90 days, you must get tested for COVID-19 even if you've been vaccinated. Your test needs to happen 2 to 4 days before you check in to the hospital or surgery site.  A clinic scheduler will call you about a week in advance to set up a testing time at one of our labs.  Note: If you have a test anywhere but [facility], be sure you get an RT-PCR or an NAAT (Nucleic Acid Amplification Test) test.  Do NOT get a \"rapid antigen\" test. Negative results from \"rapid antigen\" tests are NOT accepted before your surgery.  After the test, please stay at home and out of contact with other people. This will help prevent possible COVID-19 exposure before your treatment. Please follow all current safety guidelines, including:    Limit trips outside your home.    Limit the number of people you see.    Always wear a mask outside your home.    Use social distancing. Stay 6 feet away from others whenever you can.    Wash your hands often.  If your test shows you have COVID-19  If your test is positive, we'll let you know. A positive test means that you have the virus.   We'll probably have to postpone your admission, surgery or procedure. Your doctor will discuss this with you. After that, we'll let you know what to do and when you can re-schedule.   We may need to cancel your treatment on short notice for other reasons, too.  If your test shows you DON'T have COVID-19  Even if your test is negative, you can still get COVID-19. It's rare but, sometimes, the test result is wrong. You could also catch the virus after " taking the test.   There's a very small chance that you could catch COVID-19 in the hospital or surgery center. [facility] has taken many steps to prevent this from happening.   Day of your surgery or procedure    Please come wearing a face covering that covers both your nose and mouth.    When you arrive, we'll ask you some questions to find out if you've had any exposures to COVID-19, or have any signs of COVID-19.    Ask your care team if you can have visitors. All visitors must wear face coverings and will be screened for exposure to, or signs of, COVID-19.  ? Even if no visitors are allowed, you can still have with you:    Your legal guardian or legal decision maker    Someone to help you, if you are disabled    A parent and one other visitor, if you are younger than 18 years old    A partner and a , if you are in labor  ? We might need to teach you about taking care of yourself after surgery. If so, a visitor can come into the hospital to learn about it, too.  ? The rules for visitors change often, depending on how much the virus is spreading. To learn more, see Visiting a Loved One in the Hospital during the COVID-19 Outbreak.  Please call your care team, hospital or surgery center if you have any questions. We thank you for your understanding and for choosing [facility] for your care.   Possible surgery delay    Like you, we want your surgery to happen when it's scheduled. But sometimes the hospital is so full that it's not safe for you to have your surgery. This is especially true during the pandemic. Your surgery may need to be re-scheduled at a later date. If this happens, we will call and tell you.  Questions and answers  Does it matter where I get tested for COVID-19?  Yes. We urge you to get tested at one of our [facility] COVID-19 testing sites. These tests will be either RT-PCR or NAAT, and are accepted. We process these tests in our lab and can get the results quickly. Your  "[facility] care team needs to get your results before you check in.  What should I do if I can't get tested at [facility]?  You can get tested somewhere else, but you'll need to take these extra steps:   1. Contact your family doctor or clinic to arrange your test.  2. Take the test within 4 days of your surgery or procedure. We can't accept tests older than 4 days.  3. Make sure you're getting an RT-PCR test, or a NAAT. Some places use \"rapid antigen\" tests, but we do NOT accept negative results from those tests before your surgery.  4. Make sure your doctor or clinic faxes your results to [facility] at [phone]. Or take a photo of the results and upload it into [employer].  If we don't get your results in time, we may have to delay or cancel your treatment.  For informational purposes only. Not to replace the advice of your health care provider. Copyright   2020 [facility]. All rights reserved. Clinically reviewed by In[facility] Prevention and the [facility] COVID-19 Clinical Team. [employer] 555727 - Rev 02/01/22.    "

## 2022-03-10 NOTE — PROGRESS NOTES
[address], [address]  [facility] [address]  Phone: [phone]  Primary Provider: Kirk Chavez  Pre-op Performing Provider: KEREN RODRIGEZ      PREOPERATIVE EVALUATION:  Today's date: 3/10/2022    José Antonio Saha is a 72 year old male who presents for a preoperative evaluation.    Surgical Information:  Surgery/Procedure: Ulnar nerve transposition  Surgery Location: [facility]  Surgeon: Dr. Luevano  Surgery Date: 03/17/2022  Time of Surgery: TBD  Where patient plans to recover: At home with family  Fax number for surgical facility: [phone]    Type of Anesthesia Anticipated: General    Assessment & Plan     The proposed surgical procedure is considered INTERMEDIATE risk.    Problem List Items Addressed This Visit     None      Visit Diagnoses     Preop general physical exam    -  Primary    Ulnar neuropathy of left upper extremity               Implanted Device:   - Type of device: Baclofen pump Patient advised to bring device information on day of surgery.      Risks and Recommendations:  The patient has the following additional risks and recommendations for perioperative complications:   - No identified additional risk factors other than previously addressed    Medication Instructions:  Patient is to take all scheduled medications on the day of surgery    RECOMMENDATION:  APPROVAL GIVEN to proceed with proposed procedure, without further diagnostic evaluation.        Subjective     HPI related to upcoming procedure: Patient is going for ulnar nerve transposition  Has been feeling well lately with no complaints      Preop Questions 3/10/2022   1. Have you ever had a heart attack or stroke? No   2. Have you ever had surgery on your heart or blood vessels, such as a stent placement, a coronary artery bypass, or surgery on an artery in your head, neck, heart, or legs? No   3. Do you have chest pain with activity? No   4. Do  you have a history of  heart failure? No   5. Do you currently have a cold, bronchitis or symptoms of other infection? No   6. Do you have a cough, shortness of breath, or wheezing? No   7. Do you or anyone in your family have previous history of blood clots? No   8. Do you or does anyone in your family have a serious bleeding problem such as prolonged bleeding following surgeries or cuts? No   9. Have you ever had problems with anemia or been told to take iron pills? No   10. Have you had any abnormal blood loss such as black, tarry or bloody stools? No   11. Have you ever had a blood transfusion? UNKNOWN - possible with accident    12. Are you willing to have a blood transfusion if it is medically needed before, during, or after your surgery? Yes   13. Have you or any of your relatives ever had problems with anesthesia? No   14. Do you have sleep apnea, excessive snoring or daytime drowsiness? No   14a. Do you have a CPAP machine? -   15. Do you have any artifical heart valves or other implanted medical devices like a pacemaker, defibrillator, or continuous glucose monitor? YES -    15a. What type of device do you have? Baclofen pump   15b. Name of the clinic that manages [facility] device:  [facility]   16. Do you have artificial joints? No   17. Are you allergic to latex? No     Health Care Directive:  Patient does not have a Health Care Directive or Living Will: Patient states has Advance Directive and will bring in a copy to clinic.    Preoperative Review of :   reviewed - no record of controlled substances prescribed.      Status of Chronic Conditions:  See problem list for active medical problems.  Problems all longstanding and stable, except as noted/documented.  See ROS for pertinent symptoms related to these conditions.      Review of Systems  Constitutional, neuro, ENT, endocrine, pulmonary, cardiac, gastrointestinal, genitourinary, musculoskeletal, integument and psychiatric systems are  negative, except as otherwise noted.    Patient Active Problem List    Diagnosis Date Noted     Chronic incomplete spastic tetraplegia (H) 01/06/2022     Priority: Medium     Pancreas cyst 09/20/2021     Priority: Medium     Prostate cancer (H) 09/20/2021     Priority: Medium     H/O spinal cord injury 09/20/2021     Priority: Medium     Right shoulder pain, unspecified chronicity 05/07/2018     Priority: Medium     Cervical segment dysfunction 05/07/2018     Priority: Medium     Right supraspinatus tendinitis 05/07/2018     Priority: Medium     Thoracic segment dysfunction 05/07/2018     Priority: Medium     Sessile colonic polyp 07/29/2016     Priority: Medium     Basal cell carcinoma of skin of trunk 11/21/2013     Priority: Medium     Basal cell carcinoma of nose 11/21/2013     Priority: Medium     s/p Mohs       CARDIOVASCULAR SCREENING; LDL GOAL LESS THAN 160 09/07/2011     Priority: Medium     Special screening for malignant neoplasm of prostate 09/15/2003     Priority: Medium      Past Medical History:   Diagnosis Date     Basal cell carcinoma      Malignant melanoma of other specified sites of skin 1980s    no recurrence     Personal history of other malignant neoplasm of skin 4/15/02    recurrent BCC scalp and face     Routine general medical examination at a health care facility 4/12/02     Scrotal varices     L scrotum     Past Surgical History:   Procedure Laterality Date     COLONOSCOPY  11/19/2013    Procedure: COMBINED COLONOSCOPY, SINGLE BIOPSY/POLYPECTOMY BY BIOPSY;  COLONOSCOPY;  Surgeon: Adam Lopez MD;  Location:  GI     COLONOSCOPY N/A 9/29/2016    Procedure: COLONOSCOPY;  Surgeon: Paul Ken MD;  Location:  GI     [facility] NONSPECIFIC PROCEDURE  6/27/02    colonoscopy with polypectomy x3, fulguration x1     [facility] NONSPECIFIC PROCEDURE      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     [facility] NONSPECIFIC PROCEDURE      multiple skin Bxs     [facility] NONSPECIFIC PROCEDURE      tonsillectomy      Current Outpatient Medications   Medication Sig Dispense Refill     Acetaminophen (TYLENOL) 325 MG CAPS Take 325-650 mg by mouth as needed       baclofen (LIORESAL) 10 MG tablet >>>IF PUMP FAILS  Take 10 mg six times a day IF PUMP FAILS<<<       docusate sodium (ENEMEEZ) 283 MG enema Place 1 enema rectally daily 90 enema 3     Ethyl Alcohol, Skin Cleanser, 62 % LIQD Apply to hands as needed prior to self cath. 473 mL 1     saline 0.65 % (Soln) SOLN Spray in nostril as needed       senna (SENOKOT) 8.6 MG tablet Take 4 tablets by mouth daily       sertraline (ZOLOFT) 50 MG tablet Take 50 mg by mouth daily       sodium chloride (SHANNA 128) 5 % ophthalmic ointment 1 Application as needed for dry eyes       sulfamethoxazole-trimethoprim (BACTRIM DS) 800-160 MG tablet TAKE 1 TABLET BY MOUTH TWICE A DAY FOR 10 DAYS AT FIRST SIGN OF UTI 40 tablet 2     UNABLE TO FIND MEDICATION NAME: Benesys Powder, 3 tsp per day       Vitamin D, Cholecalciferol, 25 MCG (1000 UT) CAPS Take by mouth daily          Allergies   Allergen Reactions     No Known Drug Allergies         Social History     Tobacco Use     Smoking status: Never Smoker     Smokeless tobacco: Never Used   Substance Use Topics     Alcohol use: Yes     Alcohol/week: 0.0 standard drinks     Comment: 2  drinks per week     Family History   Problem Relation Age of Onset     Cancer Mother         thyroid and lung     Prostate Cancer Father 65        uncle also had prostate Ca     Cerebrovascular Disease Father      Hypertension Father      Arthritis Daughter      Skin Cancer Daughter      Breast Cancer Sister      Cancer - colorectal No family hx of      Diabetes No family hx of      History   Drug Use Unknown         Objective     BP 96/61 (BP Location: Left arm, Cuff Size: Adult Regular)   Pulse 72   Temp 98  F (36.7  C) (Tympanic)   Resp 16   Wt 74.8 kg (165 lb)   SpO2 96%   BMI 22.38 kg/m      Physical Exam    GENERAL APPEARANCE: healthy, alert and no distress.   Sitting in wheelchair     EYES: EOMI     CV: regular rates and rhythm, normal S1 S2, no S3 or S4 and no murmur, click or rub     MS: Limited range of motion of all extremities     SKIN: no suspicious lesions or rashes     NEURO: Normal strength and tone, sensory exam grossly normal, mentation intact and speech normal     PSYCH: mentation appears normal. and affect normal/bright    Recent Labs   Lab Test 04/19/21  1529 11/19/20  1520   HGB 14.5 14.5    283    137   POTASSIUM 4.4 4.4   CR 0.78 0.71        Diagnostics:  No labs were ordered during this visit.   No EKG required, no history of coronary heart disease, significant arrhythmia, peripheral arterial disease or other structural heart disease.    Revised Cardiac Risk Index (RCRI):  The patient has the following serious cardiovascular risks for perioperative complications:   - No serious cardiac risks = 0 points     RCRI Interpretation: 0 points: Class I (very low risk - 0.4% complication rate)           Signed Electronically by: DEE Borges CNP  Copy of this evaluation report is provided to requesting physician.

## 2022-05-05 ENCOUNTER — OFFICE VISIT (OUTPATIENT)
Dept: FAMILY MEDICINE | Facility: CLINIC | Age: 72
End: 2022-05-05
Payer: MEDICARE

## 2022-05-05 DIAGNOSIS — Z87.828 H/O SPINAL CORD INJURY: ICD-10-CM

## 2022-05-05 DIAGNOSIS — Z23 HIGH PRIORITY FOR 2019-NCOV VACCINE: ICD-10-CM

## 2022-05-05 DIAGNOSIS — Z00.00 ROUTINE GENERAL MEDICAL EXAMINATION AT A HEALTH CARE FACILITY: Primary | ICD-10-CM

## 2022-05-05 DIAGNOSIS — C61 PROSTATE CANCER (H): ICD-10-CM

## 2022-05-05 LAB
ERYTHROCYTE [DISTWIDTH] IN BLOOD BY AUTOMATED COUNT: 13.5 % (ref 10–15)
HCT VFR BLD AUTO: 44.4 % (ref 40–53)
HGB BLD-MCNC: 14.9 G/DL (ref 13.3–17.7)
MCH RBC QN AUTO: 32.5 PG (ref 26.5–33)
MCHC RBC AUTO-ENTMCNC: 33.6 G/DL (ref 31.5–36.5)
MCV RBC AUTO: 97 FL (ref 78–100)
PLATELET # BLD AUTO: 295 10E3/UL (ref 150–450)
RBC # BLD AUTO: 4.59 10E6/UL (ref 4.4–5.9)
WBC # BLD AUTO: 6.6 10E3/UL (ref 4–11)

## 2022-05-05 PROCEDURE — 0054A COVID-19,PF,PFIZER (12+ YRS): CPT | Performed by: INTERNAL MEDICINE

## 2022-05-05 PROCEDURE — G0439 PPPS, SUBSEQ VISIT: HCPCS | Performed by: INTERNAL MEDICINE

## 2022-05-05 PROCEDURE — 80053 COMPREHEN METABOLIC PANEL: CPT | Performed by: INTERNAL MEDICINE

## 2022-05-05 PROCEDURE — 84153 ASSAY OF PSA TOTAL: CPT | Performed by: INTERNAL MEDICINE

## 2022-05-05 PROCEDURE — 85027 COMPLETE CBC AUTOMATED: CPT | Performed by: INTERNAL MEDICINE

## 2022-05-05 PROCEDURE — 80061 LIPID PANEL: CPT | Performed by: INTERNAL MEDICINE

## 2022-05-05 PROCEDURE — 36415 COLL VENOUS BLD VENIPUNCTURE: CPT | Performed by: INTERNAL MEDICINE

## 2022-05-05 PROCEDURE — 91305 COVID-19,PF,PFIZER (12+ YRS): CPT | Performed by: INTERNAL MEDICINE

## 2022-05-05 ASSESSMENT — ACTIVITIES OF DAILY LIVING (ADL)
CURRENT_FUNCTION: LAUNDRY REQUIRES ASSISTANCE
CURRENT_FUNCTION: TRANSPORTATION REQUIRES ASSISTANCE
CURRENT_FUNCTION: PREPARING MEALS REQUIRES ASSISTANCE
CURRENT_FUNCTION: MEDICATION ADMINISTRATION REQUIRES ASSISTANCE
CURRENT_FUNCTION: BATHING REQUIRES ASSISTANCE
CURRENT_FUNCTION: MONEY MANAGEMENT REQUIRES ASSISTANCE
CURRENT_FUNCTION: SHOPPING REQUIRES ASSISTANCE
CURRENT_FUNCTION: HOUSEWORK REQUIRES ASSISTANCE

## 2022-05-05 ASSESSMENT — ENCOUNTER SYMPTOMS
CONSTIPATION: 0
CHILLS: 0
COUGH: 0
NERVOUS/ANXIOUS: 0
ARTHRALGIAS: 1
FREQUENCY: 1
NAUSEA: 0
DIARRHEA: 0
HEMATOCHEZIA: 0
PARESTHESIAS: 0
SHORTNESS OF BREATH: 0
SORE THROAT: 0
DIZZINESS: 0
HEARTBURN: 0
ABDOMINAL PAIN: 0
FEVER: 0
PALPITATIONS: 0
MYALGIAS: 0
JOINT SWELLING: 0
HEMATURIA: 0
DYSURIA: 0
WEAKNESS: 1
HEADACHES: 0
EYE PAIN: 0

## 2022-05-05 NOTE — PROGRESS NOTES
"SUBJECTIVE:   José Antonio Saha is a 72 year old male who presents for Preventive Visit.      Patient has been advised of split billing requirements and indicates understanding: Yes  Are you in the first 12 months of your Medicare coverage?  No    Healthy Habits:     In general, how would you rate your overall health?  Good    Frequency of exercise:  6-7 days/week    Duration of exercise:  Greater than 60 minutes    Do you usually eat at least 4 servings of fruit and vegetables a day, include whole grains    & fiber and avoid regularly eating high fat or \"junk\" foods?  Yes    Taking medications regularly:  Yes    Medication side effects:  None    Ability to successfully perform activities of daily living:  Transportation requires assistance, shopping requires assistance, preparing meals requires assistance, housework requires assistance, bathing requires assistance, laundry requires assistance, medication administration requires assistance and money management requires assistance    Home Safety:  No safety concerns identified    Hearing Impairment:  Difficulty following a conversation in a noisy restaurant or crowded room, difficulty following dialogue in the theater and difficulty understanding soft or whispered speech    In the past 6 months, have you been bothered by leaking of urine? Yes    In general, how would you rate your overall mental or emotional health?  Fair      PHQ-2 Total Score: 0    Additional concerns today:  Yes    Do you feel safe in your environment? Yes    Have you ever done Advance Care Planning? (For example, a Health Directive, POLST, or a discussion with a medical provider or your loved ones about your wishes): Yes, advance care planning is on file.       Fall risk  Fallen 2 or more times in the past year?: No  Any fall with injury in the past year?: No    Cognitive Screening   1) Repeat 3 items (Leader, Season, Table)    2) Clock draw: NORMAL  3) 3 item recall: Recalls 3 objects  Results: " 3 items recalled: COGNITIVE IMPAIRMENT LESS LIKELY    Mini-CogTM Copyright EFRAÍN Vazquez. Licensed by the author for use in [facility]; reprinted with permission (isabelle@[facility]). All rights reserved.      Do you have sleep apnea, excessive snoring or daytime drowsiness?: no    Reviewed and updated as needed this visit by clinical staff   Tobacco  Allergies  Meds                Reviewed and updated as needed this visit by Provider                   Social History     Tobacco Use     Smoking status: Never Smoker     Smokeless tobacco: Never Used   Substance Use Topics     Alcohol use: Yes     Alcohol/week: 0.0 standard drinks     Comment: 2  drinks per week     If you drink alcohol do you typically have >3 drinks per day or >7 drinks per week? No    Alcohol Use 5/5/2022   Prescreen: >3 drinks/day or >7 drinks/week? No   Prescreen: >3 drinks/day or >7 drinks/week? -         Current providers sharing in care for this patient include:   Patient Care Team:  Kirk Chavez MD as PCP - General (Internal Medicine)  Kirk Chavez MD as Assigned PCP  Shawn Holcomb MD as MD (Dermatology)  Shawn Holcomb MD as Assigned Surgical Provider    The following health maintenance items are reviewed in Epic and correct as of today:  Health Maintenance Due   Topic Date Due     AORTIC ANEURYSM SCREENING (SYSTEM ASSIGNED)  Never done     DTAP/TDAP/TD IMMUNIZATION (1 - Tdap) 06/11/2019     COLORECTAL CANCER SCREENING  10/08/2021     COVID-19 Vaccine (4 - Booster for Pfizer series) 02/28/2022     Patient Active Problem List   Diagnosis     Special screening for malignant neoplasm of prostate     CARDIOVASCULAR SCREENING; LDL GOAL LESS THAN 160     Basal cell carcinoma of skin of trunk     Basal cell carcinoma of nose     Sessile colonic polyp     Right shoulder pain, unspecified chronicity     Cervical segment dysfunction     Right supraspinatus tendinitis     Thoracic segment dysfunction     Pancreas cyst      Prostate cancer (H)     H/O spinal cord injury     Chronic incomplete spastic tetraplegia (H)     Past Surgical History:   Procedure Laterality Date     COLONOSCOPY  11/19/2013    Procedure: COMBINED COLONOSCOPY, SINGLE BIOPSY/POLYPECTOMY BY BIOPSY;  COLONOSCOPY;  Surgeon: Adam Lopez MD;  Location:  GI     COLONOSCOPY N/A 9/29/2016    Procedure: COLONOSCOPY;  Surgeon: Paul Ken MD;  Location:  GI     [facility] NONSPECIFIC PROCEDURE  6/27/02    colonoscopy with polypectomy x3, fulguration x1     [facility] NONSPECIFIC PROCEDURE      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     [facility] NONSPECIFIC PROCEDURE      multiple skin Bxs     [facility] NONSPECIFIC PROCEDURE      tonsillectomy       Social History     Tobacco Use     Smoking status: Never Smoker     Smokeless tobacco: Never Used   Substance Use Topics     Alcohol use: Yes     Alcohol/week: 0.0 standard drinks     Comment: 2  drinks per week     Family History   Problem Relation Age of Onset     Cancer Mother         thyroid and lung     Prostate Cancer Father 65        uncle also had prostate Ca     Cerebrovascular Disease Father      Hypertension Father      Arthritis Daughter      Skin Cancer Daughter      Breast Cancer Sister      Cancer - colorectal No family hx of      Diabetes No family hx of          Current Outpatient Medications   Medication Sig Dispense Refill     Acetaminophen 325 MG CAPS Take 325-650 mg by mouth as needed       baclofen (LIORESAL) 10 MG tablet >>>IF PUMP FAILS  Take 10 mg six times a day IF PUMP FAILS<<<       docusate sodium (ENEMEEZ) 283 MG enema Place 1 enema rectally daily 90 enema 3     Ethyl Alcohol, Skin Cleanser, 62 % LIQD Apply to hands as needed prior to self cath. 473 mL 1     saline 0.65 % (Soln) SOLN Spray in nostril as needed       senna (SENOKOT) 8.6 MG tablet Take 4 tablets by mouth daily       sertraline (ZOLOFT) 50 MG tablet Take 50 mg by mouth daily       sodium chloride (SHANNA 128) 5 % ophthalmic ointment 1  Application as needed for dry eyes       sulfamethoxazole-trimethoprim (BACTRIM DS) 800-160 MG tablet TAKE 1 TABLET BY MOUTH TWICE A DAY FOR 10 DAYS AT FIRST SIGN OF UTI 40 tablet 2     UNABLE TO FIND MEDICATION NAME: Katerine Powder, 3 tsp per day       Vitamin D, Cholecalciferol, 25 MCG (1000 UT) CAPS Take by mouth daily        Allergies   Allergen Reactions     No Known Drug Allergies              Review of Systems   Constitutional: Negative for chills and fever.   HENT: Negative for congestion, ear pain, hearing loss and sore throat.    Eyes: Negative for pain and visual disturbance.   Respiratory: Negative for cough and shortness of breath.    Cardiovascular: Negative for chest pain, palpitations and peripheral edema.   Gastrointestinal: Negative for abdominal pain, constipation, diarrhea, heartburn, hematochezia and nausea.   Genitourinary: Positive for frequency. Negative for dysuria, genital sores, hematuria, impotence, penile discharge and urgency.   Musculoskeletal: Positive for arthralgias. Negative for joint swelling and myalgias.   Skin: Negative for rash.   Neurological: Positive for weakness. Negative for dizziness, headaches and paresthesias.   Psychiatric/Behavioral: Negative for mood changes. The patient is not nervous/anxious.          OBJECTIVE:   There were no vitals taken for this visit. Estimated body mass index is 22.38 kg/m  as calculated from the following:    Height as of 1/6/22: 1.829 m (6').    Weight as of 3/10/22: 74.8 kg (165 lb).  Physical Exam  General: This is an improved appearing man in a wheelchair in no acute distress.  He appears quite comfortable.  HEENT: The bilateral tympanic membranes appear normal  Cardiovascular: The heart has a regular rate and rhythm without murmur gallop or rub.  No carotid bruits.  Pulmonary: The lungs are clear to auscultation bilaterally, breathing is not labored  Extremities are with trace edema bilaterally, there is an AFO brace on the right foot;  small ulcer in toe webspace without evidence of infection, he will side effects podiatrist next week   Neurological: Alert and oriented to person place and time, even less spasticity than previous exams  Mental State: Improved mood and affect, well-groomed, normal speech  Labs pending     ASSESSMENT / PLAN:   (Z00.00) Routine general medical examination at a health care facility  (primary encounter diagnosis)  Comment:   Plan: Comprehensive metabolic panel, CBC with         platelets, Lipid panel reflex to direct LDL         Fasting            (Z87.828) H/O spinal cord injury  Comment:   Plan:     (C61) Prostate cancer (H)  Comment: recheck   Plan: PSA tumor marker              Patient has been advised of split billing requirements and indicates understanding: Yes    COUNSELING:  Reviewed preventive health counseling, as reflected in patient instructions  Special attention given to:       Consider AAA screening for ages 65-75 and smoking history ; never smoker        Regular exercise       Healthy diet/nutrition       Immunizations    4th COVID shot today              Consider lung cancer screening for ages 55-80 years (77 for Medicare) and 20 pack-year smoking history ; never smoker       Colon cancer screening ; recommend FIT test in 10/2022       Prostate cancer screening ; recheck PSA     Estimated body mass index is 22.38 kg/m  as calculated from the following:    Height as of 1/6/22: 1.829 m (6').    Weight as of 3/10/22: 74.8 kg (165 lb).        He reports that he has never smoked. He has never used smokeless tobacco.      Appropriate preventive services were discussed with this patient, including applicable screening as appropriate for cardiovascular disease, diabetes, osteopenia/osteoporosis, and glaucoma.  As appropriate for age/gender, discussed screening for colorectal cancer, prostate cancer, breast cancer, and cervical cancer. Checklist reviewing preventive services available has been given to the  patient.    Reviewed patients plan of care and provided an AVS. The Basic Care Plan (routine screening as documented in Health Maintenance) for José Antonio meets the Care Plan requirement. This Care Plan has been established and reviewed with the Patient and spouse.    Counseling Resources:  ATP IV Guidelines  Pooled Cohorts Equation Calculator  Breast Cancer Risk Calculator  Breast Cancer: Medication to Reduce Risk  FRAX Risk Assessment  ICSI Preventive Guidelines  Dietary Guidelines for Americans, 2010  [employer]'s MyPlate  ASA Prophylaxis  Lung CA Screening    Kirk Chavez MD  [facility]    Identified Health Risks:

## 2022-05-05 NOTE — LETTER
May 6, 2022      Tawanda Saha  [address]  [place] [address]        Dear ,    We are writing to inform you of your test results.    The following letter pertains to your most recent diagnostic tests:     -The PSA is slightly higher than last check.  Rather than rushing off to urology because of this, I might suggest rechecking in 1-2 months to establish a trend.  If it is stable or lower at that interval, then I think we can continue with our current surveillance plan.  If it is much higher at that interval, then we can get Dr Ayala or one of his partners back involved.  You can schedule a lab appointment for that purpose.       -Your cholesterol panel looks healthy.     -Liver and gallbladder tests are normal for you. (ALT,AST, Alk phos, bilirubin), kidney function is normal for you (Creatinine, GFR), Sodium is normal, Potassium is normal for you, Calcium is normal for you, Glucose (blood sugar) is normal for you.       -Your complete blood counts including your hemoglobin returned normal for you.             Bottom line:  Labs look very good except for that we should recheck the PSA in a month or two.   You can schedule a lab appointment for that purpose.               Follow up:  Lab appointment in 1-2 months to recheck the PSA       Resulted Orders   PSA tumor marker   Result Value Ref Range    PSA Tumor Marker 14.00 (H) 0.00 - 4.00 ug/L   Comprehensive metabolic panel   Result Value Ref Range    Sodium 137 133 - 144 mmol/L    Potassium 4.2 3.4 - 5.3 mmol/L    Chloride 103 94 - 109 mmol/L    Carbon Dioxide (CO2) 27 20 - 32 mmol/L    Anion Gap 7 3 - 14 mmol/L    Urea Nitrogen 14 7 - 30 mg/dL    Creatinine 0.61 (L) 0.66 - 1.25 mg/dL    Calcium 8.8 8.5 - 10.1 mg/dL    Glucose 111 (H) 70 - 99 mg/dL    Alkaline Phosphatase 49 40 - 150 U/L    AST 16 0 - 45 U/L    ALT 21 0 - 70 U/L    Protein Total 7.3 6.8 - 8.8 g/dL    Albumin 3.7 3.4 - 5.0 g/dL    Bilirubin Total 0.4 0.2 - 1.3 mg/dL    GFR  Estimate >90 >60 mL/min/1.73m2      Comment:      Effective December 21, 2021 eGFRcr in adults is calculated using the 2021 CKD-EPI creatinine equation which includes age and gender (Riana et al., NEJ, DOI: 10.1056/VIQHjk0858879)   CBC with platelets   Result Value Ref Range    WBC Count 6.6 4.0 - 11.0 10e3/uL    RBC Count 4.59 4.40 - 5.90 10e6/uL    Hemoglobin 14.9 13.3 - 17.7 g/dL    Hematocrit 44.4 40.0 - 53.0 %    MCV 97 78 - 100 fL    MCH 32.5 26.5 - 33.0 pg    MCHC 33.6 31.5 - 36.5 g/dL    RDW 13.5 10.0 - 15.0 %    Platelet Count 295 150 - 450 10e3/uL   Lipid panel reflex to direct LDL Fasting   Result Value Ref Range    Cholesterol 175 <200 mg/dL    Triglycerides 95 <150 mg/dL    Direct Measure HDL 65 >=40 mg/dL    LDL Cholesterol Calculated 91 <=100 mg/dL    Non HDL Cholesterol 110 <130 mg/dL    Patient Fasting > 8hrs? No     Narrative    Cholesterol  Desirable:  <200 mg/dL    Triglycerides  Normal:  Less than 150 mg/dL  Borderline High:  150-199 mg/dL  High:  200-499 mg/dL  Very High:  Greater than or equal to 500 mg/dL    Direct Measure HDL  Female:  Greater than or equal to 50 mg/dL   Male:  Greater than or equal to 40 mg/dL    LDL Cholesterol  Desirable:  <100mg/dL  Above Desirable:  100-129 mg/dL   Borderline High:  130-159 mg/dL   High:  160-189 mg/dL   Very High:  >= 190 mg/dL    Non HDL Cholesterol  Desirable:  130 mg/dL  Above Desirable:  130-159 mg/dL  Borderline High:  160-189 mg/dL  High:  190-219 mg/dL  Very High:  Greater than or equal to 220 mg/dL       If you have any questions or concerns, please call the clinic at the number listed above.       Sincerely,      Kirk Chavez MD

## 2022-05-06 LAB
ALBUMIN SERPL-MCNC: 3.7 G/DL (ref 3.4–5)
ALP SERPL-CCNC: 49 U/L (ref 40–150)
ALT SERPL W P-5'-P-CCNC: 21 U/L (ref 0–70)
ANION GAP SERPL CALCULATED.3IONS-SCNC: 7 MMOL/L (ref 3–14)
AST SERPL W P-5'-P-CCNC: 16 U/L (ref 0–45)
BILIRUB SERPL-MCNC: 0.4 MG/DL (ref 0.2–1.3)
BUN SERPL-MCNC: 14 MG/DL (ref 7–30)
CALCIUM SERPL-MCNC: 8.8 MG/DL (ref 8.5–10.1)
CHLORIDE BLD-SCNC: 103 MMOL/L (ref 94–109)
CHOLEST SERPL-MCNC: 175 MG/DL
CO2 SERPL-SCNC: 27 MMOL/L (ref 20–32)
CREAT SERPL-MCNC: 0.61 MG/DL (ref 0.66–1.25)
FASTING STATUS PATIENT QL REPORTED: NO
GFR SERPL CREATININE-BSD FRML MDRD: >90 ML/MIN/1.73M2
GLUCOSE BLD-MCNC: 111 MG/DL (ref 70–99)
HDLC SERPL-MCNC: 65 MG/DL
LDLC SERPL CALC-MCNC: 91 MG/DL
NONHDLC SERPL-MCNC: 110 MG/DL
POTASSIUM BLD-SCNC: 4.2 MMOL/L (ref 3.4–5.3)
PROT SERPL-MCNC: 7.3 G/DL (ref 6.8–8.8)
PSA SERPL-MCNC: 14 UG/L (ref 0–4)
SODIUM SERPL-SCNC: 137 MMOL/L (ref 133–144)
TRIGL SERPL-MCNC: 95 MG/DL

## 2022-05-16 ENCOUNTER — TELEPHONE (OUTPATIENT)
Dept: FAMILY MEDICINE | Facility: CLINIC | Age: 72
End: 2022-05-16

## 2022-05-16 NOTE — TELEPHONE ENCOUNTER
Laceration of left eyebrow, getting it sutured tomorrow, at [facility],   Lito Sweet, Plastic Surgeon,   tomorrw morning  This is outpatient  Spoke to Dr. Sweet's office, [facility] in [place], told them he tested positive for covid on Saturday.   Was told he is on schedule for tomorrow, they will call hospital to ask about covid protocol, and   Symptoms started last Wed, testes positive Sat:   --mild nasal congestion, did have dry cough - but this is gone,   Otherwise he is feeling good, good energy.

## 2022-06-09 ENCOUNTER — NURSE TRIAGE (OUTPATIENT)
Dept: NURSING | Facility: CLINIC | Age: 72
End: 2022-06-09
Payer: MEDICARE

## 2022-06-09 NOTE — TELEPHONE ENCOUNTER
"Patient's wife calling with patient on phone as well.  Verbal consent given by patient over the phone.      Patient says about 2 hours ago he was reading book and was suddenly feeling tired and could not concentrate.  Felt lightheaded and foggy.  Patient' swife says he looked gray, his speech was garbled, he felt \"fuzzy\" and could not form worsds.  They checked his blood pressure and it was 129/82.  He had some 7 up and crackers.  Wife says he has had 3-4 of these \"episodes\" over the last week.  He is feeling better right now but still does \"not feel normal.\"  Says he is having problems \"forming words\" because he can't get his \"mouth going in the right shape.\"    Triaged to disposition of Call  Now.     Melissa Gonsalez RN  Triage Nurse Advisor    Reason for Disposition    Difficult to awaken or acting confused (e.g., disoriented, slurred speech)    Protocols used: NEUROLOGIC DEFICIT-A-OH      "

## 2022-06-20 ENCOUNTER — OFFICE VISIT (OUTPATIENT)
Dept: FAMILY MEDICINE | Facility: CLINIC | Age: 72
End: 2022-06-20
Payer: MEDICARE

## 2022-06-20 VITALS
TEMPERATURE: 96.8 F | OXYGEN SATURATION: 96 % | HEIGHT: 72 IN | WEIGHT: 165 LBS | RESPIRATION RATE: 16 BRPM | DIASTOLIC BLOOD PRESSURE: 60 MMHG | HEART RATE: 67 BPM | SYSTOLIC BLOOD PRESSURE: 100 MMHG | BODY MASS INDEX: 22.35 KG/M2

## 2022-06-20 DIAGNOSIS — F43.21 ADJUSTMENT DISORDER WITH DEPRESSED MOOD: ICD-10-CM

## 2022-06-20 DIAGNOSIS — G82.50 CHRONIC INCOMPLETE SPASTIC TETRAPLEGIA (H): ICD-10-CM

## 2022-06-20 DIAGNOSIS — M00.9 SEPTIC ARTHRITIS, DUE TO UNSPECIFIED ORGANISM, SEPTIC ARTHRITIS OF UNSPECIFIED LOCATION (H): Primary | ICD-10-CM

## 2022-06-20 DIAGNOSIS — C61 PROSTATE CANCER (H): ICD-10-CM

## 2022-06-20 PROCEDURE — 36415 COLL VENOUS BLD VENIPUNCTURE: CPT | Performed by: INTERNAL MEDICINE

## 2022-06-20 PROCEDURE — 84153 ASSAY OF PSA TOTAL: CPT | Performed by: INTERNAL MEDICINE

## 2022-06-20 PROCEDURE — 99214 OFFICE O/P EST MOD 30 MIN: CPT | Performed by: INTERNAL MEDICINE

## 2022-06-20 ASSESSMENT — PAIN SCALES - GENERAL: PAINLEVEL: NO PAIN (0)

## 2022-06-20 NOTE — LETTER
June 21, 2022      Tawanda Saha  [address]  [place] [address]        Dear ,    The following letter pertains to your most recent diagnostic tests:     Unfortunately, it looks like the PSA continues to climb slightly.  Once you have recovered from toe surgery, I think you schedule an appointment with Dr. Ayala to discuss next steps for this.  I don't think this is an emergency, but I think you should discuss further risk stratification strategies with a urologist.            Resulted Orders   PSA tumor marker   Result Value Ref Range    PSA Tumor Marker 16.40 (H) 0.00 - 4.00 ug/L       If you have any questions or concerns, please call the clinic at the number listed above.       Sincerely,      Kirk Chavez MD      juan
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
## 2022-06-21 LAB — PSA SERPL-MCNC: 16.4 UG/L (ref 0–4)

## 2022-06-21 NOTE — RESULT ENCOUNTER NOTE
The following letter pertains to your most recent diagnostic tests:    Unfortunately, it looks like the PSA continues to climb slightly.  Once you have recovered from toe surgery, I think you schedule an appointment with Dr. Ayala to discuss next steps for this.  I don't think this is an emergency, but I think you should discuss further risk stratification strategies with a urologist.          Sincerely,    Dr. Chavez

## 2022-08-22 ENCOUNTER — MEDICAL CORRESPONDENCE (OUTPATIENT)
Dept: HEALTH INFORMATION MANAGEMENT | Facility: CLINIC | Age: 72
End: 2022-08-22

## 2022-08-30 ENCOUNTER — MEDICAL CORRESPONDENCE (OUTPATIENT)
Dept: HEALTH INFORMATION MANAGEMENT | Facility: CLINIC | Age: 72
End: 2022-08-30

## 2022-10-15 ENCOUNTER — HEALTH MAINTENANCE LETTER (OUTPATIENT)
Age: 72
End: 2022-10-15

## 2022-10-24 ENCOUNTER — TRANSFERRED RECORDS (OUTPATIENT)
Dept: HEALTH INFORMATION MANAGEMENT | Facility: CLINIC | Age: 72
End: 2022-10-24

## 2022-11-10 ENCOUNTER — TRANSFERRED RECORDS (OUTPATIENT)
Dept: HEALTH INFORMATION MANAGEMENT | Facility: CLINIC | Age: 72
End: 2022-11-10

## 2023-03-06 ENCOUNTER — OFFICE VISIT (OUTPATIENT)
Dept: FAMILY MEDICINE | Facility: CLINIC | Age: 73
End: 2023-03-06
Payer: MEDICARE

## 2023-03-06 VITALS
DIASTOLIC BLOOD PRESSURE: 68 MMHG | OXYGEN SATURATION: 96 % | RESPIRATION RATE: 14 BRPM | TEMPERATURE: 98.2 F | SYSTOLIC BLOOD PRESSURE: 107 MMHG | HEART RATE: 48 BPM

## 2023-03-06 DIAGNOSIS — Z01.818 PREOP GENERAL PHYSICAL EXAM: Primary | ICD-10-CM

## 2023-03-06 DIAGNOSIS — N31.9 NEUROGENIC BLADDER: ICD-10-CM

## 2023-03-06 PROCEDURE — 99213 OFFICE O/P EST LOW 20 MIN: CPT | Performed by: NURSE PRACTITIONER

## 2023-03-06 ASSESSMENT — PAIN SCALES - GENERAL: PAINLEVEL: NO PAIN (0)

## 2023-03-06 NOTE — PATIENT INSTRUCTIONS
For informational purposes only. Not to replace the advice of your health care provider. Copyright   2003,  [facility] [employer]. All rights reserved. Clinically reviewed by Salud Acosta MD. [facility] [id number] - REV .  Preparing for Your Surgery  Getting started  A nurse will call you to review your health history and instructions. They will give you an arrival time based on your scheduled surgery time. Please be ready to share:    Your doctor's clinic name and phone number    Your medical, surgical, and anesthesia history    A list of allergies and sensitivities    A list of medicines, including herbal treatments and over-the-counter drugs    Whether the patient has a legal guardian (ask how to send us the papers in advance)  Please tell us if you're pregnant--or if there's any chance you might be pregnant. Some surgeries may injure a fetus (unborn baby), so they require a pregnancy test. Surgeries that are safe for a fetus don't always need a test, and you can choose whether to have one.   If you have a child who's having surgery, please ask for a copy of Preparing for Your Child's Surgery.    Preparing for surgery    Within 10 to 30 days of surgery: Have a pre-op exam (sometimes called an H&P, or History and Physical). This can be done at a clinic or pre-operative center.  ? If you're having a , you may not need this exam. Talk to your care team.    At your pre-op exam, talk to your care team about all medicines you take. If you need to stop any medicines before surgery, ask when to start taking them again.  ? We do this for your safety. Many medicines can make you bleed too much during surgery. Some change how well surgery (anesthesia) drugs work.    Call your insurance company to let them know you're having surgery. (If you don't have insurance, call [phone].)    Call your clinic if there's any change in your health. This includes signs of a cold or flu (sore throat, runny nose,  cough, rash, fever). It also includes a scrape or scratch near the surgery site.    If you have questions on the day of surgery, call your hospital or surgery center.  Eating and drinking guidelines  For your safety: Unless your surgeon tells you otherwise, follow the guidelines below.    Eat and drink as usual until 8 hours before you arrive for surgery. After that, no food or milk.    Drink clear liquids until 2 hours before you arrive. These are liquids you can see through, like water, Gatorade, and Propel Water. They also include plain black coffee and tea (no cream or milk), candy, and breath mints. You can spit out gum when you arrive.    If you drink alcohol: Stop drinking it the night before surgery.    If your care team tells you to take medicine on the morning of surgery, it's okay to take it with a sip of water.  Preventing infection    Shower or bathe the night before and morning of your surgery. Follow the instructions your clinic gave you. (If no instructions, use regular soap.)    Don't shave or clip hair near your surgery site. We'll remove the hair if needed.    Don't smoke or vape the morning of surgery. You may chew nicotine gum up to 2 hours before surgery. A nicotine patch is okay.  ? Note: Some surgeries require you to completely quit smoking and nicotine. Check with your surgeon.    Your care team will make every effort to keep you safe from infection. We will:  ? Clean our hands often with soap and water (or an alcohol-based hand rub).  ? Clean the skin at your surgery site with a special soap that kills germs.  ? Give you a special gown to keep you warm. (Cold raises the risk of infection.)  ? Wear special hair covers, masks, gowns and gloves during surgery.  ? Give antibiotic medicine, if prescribed. Not all surgeries need antibiotics.  What to bring on the day of surgery    Photo ID and insurance card    Copy of your health care directive, if you have one    Glasses and hearing aids (bring  cases)  ? You can't wear contacts during surgery    Inhaler and eye drops, if you use them (tell us about these when you arrive)    CPAP machine or breathing device, if you use them    A few personal items, if spending the night    If you have . . .  ? A pacemaker, ICD (cardiac defibrillator) or other implant: Bring the ID card.  ? An implanted stimulator: Bring the remote control.  ? A legal guardian: Bring a copy of the certified (court-stamped) guardianship papers.  Please remove any jewelry, including body piercings. Leave jewelry and other valuables at home.  If you're going home the day of surgery    You must have a responsible adult drive you home. They should stay with you overnight as well.    If you don't have someone to stay with you, and you aren't safe to go home alone, we may keep you overnight. Insurance often won't pay for this.  After surgery  If it's hard to control your pain or you need more pain medicine, please call your surgeon's office.  Questions?   If you have any questions for your care team, list them here: _________________________________________________________________________________________________________________________________________________________________________ ____________________________________ ____________________________________ ____________________________________

## 2023-03-06 NOTE — PROGRESS NOTES
[address], [address]  [facility] [address]  Phone: [phone]  Primary Provider: Kirk Chavez  Pre-op Performing Provider: KEREN RODRIGEZ      PREOPERATIVE EVALUATION:  Today's date: 3/6/2023    José Antonio Saha is a 73 year old male who presents for a preoperative evaluation.    Surgical Information:  Surgery/Procedure: Cystoscopy botox injections  Surgery Location: [address] [facility]  Surgeon: Hui Connors MBBS  Surgery Date: 3/16/23   Time of Surgery: 12:15 PM  Where patient plans to recover: At home with family  Fax number for surgical facility: [phone]    Type of Anesthesia Anticipated: to be determined    Assessment & Plan     The proposed surgical procedure is considered LOW risk.    (Z01.818) Preop general physical exam  (primary encounter diagnosis)  Comment: ok for surgery. No concerns today   Plan:     (N31.9) Neurogenic bladder  Comment:   Plan:       Possible Sleep Apnea: on bipap          Risks and Recommendations:  The patient has the following additional risks and recommendations for perioperative complications:   - No identified additional risk factors other than previously addressed    Medication Instructions:  Hold vitamins morning of procedure     RECOMMENDATION:  APPROVAL GIVEN to proceed with proposed procedure, without further diagnostic evaluation.        Subjective     HPI related to upcoming procedure:   Going for botox injections for neurogenic bladder   No recent illness   Recent dx of ALL/central sleep apnea and has a bipap       Preop Questions 3/6/2023   1. Have you ever had a heart attack or stroke? No   2. Have you ever had surgery on your heart or blood vessels, such as a stent placement, a coronary artery bypass, or surgery on an artery in your head, neck, heart, or legs? No   3. Do you have chest pain with activity? No   4. Do you have a history of  heart failure? No   5. Do you currently have  a cold, bronchitis or symptoms of other infection? No   6. Do you have a cough, shortness of breath, or wheezing? No   7. Do you or anyone in your family have previous history of blood clots? No   8. Do you or does anyone in your family have a serious bleeding problem such as prolonged bleeding following surgeries or cuts? No   9. Have you ever had problems with anemia or been told to take iron pills? No   10. Have you had any abnormal blood loss such as black, tarry or bloody stools? No   11. Have you ever had a blood transfusion? No   12. Are you willing to have a blood transfusion if it is medically needed before, during, or after your surgery? Yes   13. Have you or any of your relatives ever had problems with anesthesia? No   14. Do you have sleep apnea, excessive snoring or daytime drowsiness? YES - new dx    14a. Do you have a CPAP machine? Yes   15. Do you have any artifical heart valves or other implanted medical devices like a pacemaker, defibrillator, or continuous glucose monitor? No   15a. What type of device do you have? -   15b. Name of the clinic that manages your device:  -   16. Do you have artificial joints? No   17. Are you allergic to latex? No       Health Care Directive:  Patient does not have a Health Care Directive or Living Will:     Preoperative Review of :   reviewed - historical short course      Status of Chronic Conditions:  See problem list for active medical problems.  Problems all longstanding and stable, except as noted/documented.  See ROS for pertinent symptoms related to these conditions.      Review of Systems  Constitutional, neuro, ENT, endocrine, pulmonary, cardiac, gastrointestinal, genitourinary, musculoskeletal, integument and psychiatric systems are negative, except as otherwise noted.    Patient Active Problem List    Diagnosis Date Noted     Adjustment disorder with depressed mood 06/20/2022     Priority: Medium     Chronic incomplete spastic tetraplegia (H)  01/06/2022     Priority: Medium     Pancreas cyst 09/20/2021     Priority: Medium     Prostate cancer (H) 09/20/2021     Priority: Medium     H/O spinal cord injury 09/20/2021     Priority: Medium     Right shoulder pain, unspecified chronicity 05/07/2018     Priority: Medium     Cervical segment dysfunction 05/07/2018     Priority: Medium     Right supraspinatus tendinitis 05/07/2018     Priority: Medium     Thoracic segment dysfunction 05/07/2018     Priority: Medium     Sessile colonic polyp 07/29/2016     Priority: Medium     Basal cell carcinoma of skin of trunk 11/21/2013     Priority: Medium     Basal cell carcinoma of nose 11/21/2013     Priority: Medium     s/p Mohs       CARDIOVASCULAR SCREENING; LDL GOAL LESS THAN 160 09/07/2011     Priority: Medium     Special screening for malignant neoplasm of prostate 09/15/2003     Priority: Medium      Past Medical History:   Diagnosis Date     Basal cell carcinoma      Malignant melanoma of other specified sites of skin 1980s    no recurrence     Personal history of other malignant neoplasm of skin 4/15/02    recurrent BCC scalp and face     Routine general medical examination at a health care facility 4/12/02     Scrotal varices     L scrotum     Past Surgical History:   Procedure Laterality Date     COLONOSCOPY  11/19/2013    Procedure: COMBINED COLONOSCOPY, SINGLE BIOPSY/POLYPECTOMY BY BIOPSY;  COLONOSCOPY;  Surgeon: Adam Lopez MD;  Location:  GI     COLONOSCOPY N/A 9/29/2016    Procedure: COLONOSCOPY;  Surgeon: Paul Ken MD;  Location:  GI     [facility] NONSPECIFIC PROCEDURE  6/27/02    colonoscopy with polypectomy x3, fulguration x1     [facility] NONSPECIFIC PROCEDURE      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     [facility] NONSPECIFIC PROCEDURE      multiple skin Bxs     [facility] NONSPECIFIC PROCEDURE      tonsillectomy     Current Outpatient Medications   Medication Sig Dispense Refill     Acetaminophen 325 MG CAPS Take 325-650 mg by mouth as needed        Amoxicillin-Pot Clavulanate (AUGMENTIN PO)        baclofen (LIORESAL) 10 MG tablet >>>IF PUMP FAILS  Take 10 mg six times a day IF PUMP FAILS<<<       docusate sodium (ENEMEEZ) 283 MG enema Place 1 enema rectally daily 90 enema 3     Ethyl Alcohol, Skin Cleanser, 62 % LIQD Apply to hands as needed prior to self cath. 473 mL 1     saline 0.65 % (Soln) SOLN Spray in nostril as needed       senna (SENOKOT) 8.6 MG tablet Take 4 tablets by mouth daily       sertraline (ZOLOFT) 50 MG tablet Take 50 mg by mouth daily       sodium chloride (SHANNA 128) 5 % ophthalmic ointment 1 Application as needed for dry eyes       UNABLE TO FIND MEDICATION NAME: Benesys Powder, 3 tsp per day       Vitamin D, Cholecalciferol, 25 MCG (1000 UT) CAPS Take by mouth daily          Allergies   Allergen Reactions     No Known Drug Allergies         Social History     Tobacco Use     Smoking status: Never     Smokeless tobacco: Never   Substance Use Topics     Alcohol use: Yes     Alcohol/week: 0.0 standard drinks     Comment: 2  drinks per week     Family History   Problem Relation Age of Onset     Cancer Mother         thyroid and lung     Prostate Cancer Father 65        uncle also had prostate Ca     Cerebrovascular Disease Father      Hypertension Father      Arthritis Daughter      Skin Cancer Daughter      Breast Cancer Sister      Cancer - colorectal No family hx of      Diabetes No family hx of      History   Drug Use Unknown         Objective     /68 (BP Location: Right arm, Patient Position: Sitting, Cuff Size: Adult Regular)   Pulse (!) 48   Temp 98.2  F (36.8  C) (Oral)   Resp 14   SpO2 96%     Physical Exam    GENERAL APPEARANCE: healthy, alert and no distress     EYES: EOMI     RESP: lungs clear to auscultation - no rales, rhonchi or wheezes     CV: regular rates and rhythm, normal S1 S2, no S3 or S4 and no murmur, click or rub     MS: extremities normal- no gross deformities noted, no evidence of inflammation in joints,  FROM in all extremities.     SKIN: no suspicious lesions or rashes     NEURO: Normal strength and tone, sensory exam grossly normal, mentation intact and speech normal     PSYCH: mentation appears normal. and affect normal/bright    Recent Labs   Lab Test 05/05/22  1259 04/19/21  1529   HGB 14.9 14.5    304    137   POTASSIUM 4.2 4.4   CR 0.61* 0.78        Diagnostics:  No labs were ordered during this visit.   No EKG required for low risk surgery (cataract, skin procedure, breast biopsy, etc).    Revised Cardiac Risk Index (RCRI):  The patient has the following serious cardiovascular risks for perioperative complications:   - No serious cardiac risks = 0 points     RCRI Interpretation: 0 points: Class I (very low risk - 0.4% complication rate)           Signed Electronically by: DEE Borges CNP  Copy of this evaluation report is provided to requesting physician.

## 2023-06-01 ENCOUNTER — OFFICE VISIT (OUTPATIENT)
Dept: DERMATOLOGY | Facility: CLINIC | Age: 73
End: 2023-06-01
Payer: MEDICARE

## 2023-06-01 DIAGNOSIS — Z85.820 HISTORY OF MELANOMA: ICD-10-CM

## 2023-06-01 DIAGNOSIS — D48.5 NEOPLASM OF UNCERTAIN BEHAVIOR OF SKIN: Primary | ICD-10-CM

## 2023-06-01 DIAGNOSIS — D18.01 ANGIOMA OF SKIN: ICD-10-CM

## 2023-06-01 DIAGNOSIS — Z85.828 HISTORY OF SKIN CANCER: ICD-10-CM

## 2023-06-01 DIAGNOSIS — L82.1 SEBORRHEIC KERATOSIS: ICD-10-CM

## 2023-06-01 DIAGNOSIS — L81.4 LENTIGO: ICD-10-CM

## 2023-06-01 DIAGNOSIS — L57.0 AK (ACTINIC KERATOSIS): ICD-10-CM

## 2023-06-01 DIAGNOSIS — D23.9 DERMAL NEVUS: ICD-10-CM

## 2023-06-01 PROCEDURE — 11102 TANGNTL BX SKIN SINGLE LES: CPT | Performed by: DERMATOLOGY

## 2023-06-01 PROCEDURE — 17000 DESTRUCT PREMALG LESION: CPT | Mod: 59 | Performed by: DERMATOLOGY

## 2023-06-01 PROCEDURE — 99213 OFFICE O/P EST LOW 20 MIN: CPT | Mod: 25 | Performed by: DERMATOLOGY

## 2023-06-01 PROCEDURE — 88305 TISSUE EXAM BY PATHOLOGIST: CPT | Performed by: DERMATOLOGY

## 2023-06-01 NOTE — LETTER
6/1/2023         RE: José Antonio Saha  [address]  [place] [address]        Dear Colleague,    Thank you for referring your patient, José Antonio Saha, to the [facility]. Please see a copy of my visit note below.    José Antonio Saha is an extremely pleasant 73 year old year old male patient here today for hx of non-melanoma skin cancer.  HE ntoes spot on left cheek.   .   Patient states this has been present for some time. Patient reports the following symptoms:  rough.  Patient reports the following previous treatments none.  These treatments did not work.  Patient reports the following modifying factors none.  Associated symptoms: none.  Patient has no other skin complaints today.  Remainder of the HPI, Meds, PMH, Allergies, FH, and SH was reviewed in chart.      Past Medical History:   Diagnosis Date     Basal cell carcinoma      Malignant melanoma of other specified sites of skin 1980s    no recurrence     Personal history of other malignant neoplasm of skin 4/15/02    recurrent BCC scalp and face     Routine general medical examination at a [facility] care facility 4/12/02     Scrotal varices     L scrotum       Past Surgical History:   Procedure Laterality Date     COLONOSCOPY  11/19/2013    Procedure: COMBINED COLONOSCOPY, SINGLE BIOPSY/POLYPECTOMY BY BIOPSY;  COLONOSCOPY;  Surgeon: Adam Lopez MD;  Location:  GI     COLONOSCOPY N/A 9/29/2016    Procedure: COLONOSCOPY;  Surgeon: Paul Ken MD;  Location:  GI     [facility] NONSPECIFIC PROCEDURE  6/27/02    colonoscopy with polypectomy x3, fulguration x1     [facility] NONSPECIFIC PROCEDURE      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     [facility] NONSPECIFIC PROCEDURE      multiple skin Bxs     [facility] NONSPECIFIC PROCEDURE      tonsillectomy        Family History   Problem Relation Age of Onset     Cancer Mother         thyroid and lung     Prostate Cancer Father 65        uncle also had prostate Ca      Cerebrovascular Disease Father      Hypertension Father      Arthritis Daughter      Skin Cancer Daughter      Breast Cancer Sister      Cancer - colorectal No family hx of      Diabetes No family hx of        Social History     Socioeconomic History     Marital status:      Spouse name: Not on file     Number of children: 3     Years of education: Not on file     Highest education level: Not on file   Occupational History     Employer: [employer]   Tobacco Use     Smoking status: Never     Smokeless tobacco: Never   Vaping Use     Vaping status: Never Used   Substance and Sexual Activity     Alcohol use: Yes     Alcohol/week: 0.0 standard drinks of alcohol     Comment: 2  drinks per week     Drug use: Not Currently     Sexual activity: Not on file   Other Topics Concern     Not on file   Social History Narrative     Not on file     Social Determinants of Health     Financial Resource Strain: Low Risk  (9/21/2021)    Overall Financial Resource Strain (CARDIA)      Difficulty of Paying Living Expenses: Not hard at all   Food Insecurity: No Food Insecurity (9/21/2021)    Hunger Vital Sign      Worried About Running Out of Food in the Last Year: Never true      Ran Out of Food in the Last Year: Never true   Transportation Needs: No Transportation Needs (9/21/2021)    PRAPARE - Transportation      Lack of Transportation (Medical): No      Lack of Transportation (Non-Medical): No   Physical Activity: Not on file   Stress: Not on file   Social Connections: Not on file   Intimate Partner Violence: Not on file   Housing Stability: Unknown (9/21/2021)    Housing Stability Vital Sign      Unable to Pay for Housing in the Last Year: No      Number of Places Lived in the Last Year: Not on file      Unstable Housing in the Last Year: No       Outpatient Encounter Medications as of 6/1/2023   Medication Sig Dispense Refill     Acetaminophen 325 MG CAPS Take 325-650 mg by mouth as needed        Amoxicillin-Pot Clavulanate (AUGMENTIN PO)        baclofen (LIORESAL) 10 MG tablet >>>IF PUMP FAILS  Take 10 mg six times a day IF PUMP FAILS<<<       docusate sodium (ENEMEEZ) 283 MG enema Place 1 enema rectally daily 90 enema 3     Ethyl Alcohol, Skin Cleanser, 62 % LIQD Apply to hands as needed prior to self cath. 473 mL 1     saline 0.65 % (Soln) SOLN Spray in nostril as needed       senna (SENOKOT) 8.6 MG tablet Take 4 tablets by mouth daily       sertraline (ZOLOFT) 50 MG tablet Take 50 mg by mouth daily       sodium chloride (SHANNA 128) 5 % ophthalmic ointment 1 Application as needed for dry eyes       UNABLE TO FIND MEDICATION NAME: Benesys Powder, 3 tsp per day       Vitamin D, Cholecalciferol, 25 MCG (1000 UT) CAPS Take by mouth daily        No facility-administered encounter medications on file as of 6/1/2023.             O:   NAD, WDWN, Alert & Oriented, Mood & Affect wnl, Vitals stable   Here today with wife    General appearance normal   Vitals stable   Alert, oriented and in no acute distress      Following lymph nodes palpated: Occipital, Cervical, Supraclavicular no lad  L Sideburn 6mm pink pearly papule   L cheek gritty scaly papule      Stuck on papules and brown macules on trunk and ext   Red papules on trunk  Flesh colored papules on trunk     The remainder of the full exam was normal; the following areas were examined:  conjunctiva/lids, , neck, peripheral vascular system, abdomen, lymph nodes, digits/nails, eccrine and apocrine glands, scalp/hair, face, neck, chest, abdomen, buttocks, back, RUE, LUE, RLE, LLE       Eyes: Conjunctivae/lids:Normal     ENT: Lips, buccal mucosa, tongue: normal    MSK:Normal    Cardiovascular: peripheral edema none    Pulm: Breathing Normal    Lymph Nodes: No Head and Neck Lymphadenopathy     Neuro/Psych: Orientation:Alert and Orientedx3 ; Mood/Affect:normal       A/P:  1. Seborrheic keratosis, lentigo, angioma, dermal nevus, hx of melanoma, hx of skin cancer  2. L  cheek actinic keratosis   LN2:  Treated with LN2 for 5s for 1-2 cycles. Warned risks of blistering, pain, pigment change, scarring, and incomplete resolution.  Advised patient to return if lesions do not completely resolve.  Wound care sheet given.  3. L sideburn r/o basal cell carcinoma   TANGENTIAL BIOPSY SENT OUT:  After consent, anesthesia with LEC and prep, tangential excision performed and specimen sent out for permanent section histology.  No complications and routine wound care. Patient told to call our office in 1-2 weeks for result.       It was a pleasure speaking to José Antonio Saha today.  Previous clinic notes and pertinent laboratory tests were reviewed prior to José Antonio Saha's visit.  Signs and Symptoms of skin cancer discussed with patient.  Patient encouraged to perform monthly skin exams.  UV precautions reviewed with patient.  Risks of non-melanoma skin cancer discussed with patient   Return to clinic next appt        Again, thank you for allowing me to participate in the care of your patient.        Sincerely,        Shawn Holcomb MD

## 2023-06-01 NOTE — PATIENT INSTRUCTIONS
Wound Care Instructions     FOR SUPERFICIAL WOUNDS     [facility] [phone]                 AFTER 24 HOURS YOU SHOULD REMOVE THE BANDAGE AND BEGIN DAILY DRESSING CHANGES AS FOLLOWS:     1) Remove Dressing.     2) Clean and dry the area with tap water using a Q-tip or sterile gauze pad.     3) Apply Vaseline, Aquaphor, Polysporin ointment or Bacitracin ointment over entire wound.  Do NOT use Neosporin ointment.     4) Cover the wound with a band-aid, or a sterile non-stick gauze pad and micropore paper tape    REPEAT THESE INSTRUCTIONS AT LEAST ONCE A DAY UNTIL THE WOUND HAS COMPLETELY HEALED.    It is an old wives tale that a wound heals better when it is exposed to air and allowed to dry out. The wound will heal faster with a better cosmetic result if it is kept moist with ointment and covered with a bandage.    **Do not let the wound dry out.**    Supplies Needed:      *Cotton tipped applicators (Q-tips)    *Vaseline, Aquaphor, Polysporin or Bacitracin Ointment (NOT NEOSPORIN)    *Band-aids or non-stick gauze pads and micropore paper tape.      PATIENT INFORMATION:    During the healing process you will notice a number of changes. All wounds develop a small halo of redness surrounding the wound.  This means healing is occurring. Severe itching with extensive redness usually indicates sensitivity to the ointment or bandage tape used to dress the wound.  You should call our office if this develops.      Swelling  and/or discoloration around your surgical site is common, particularly when performed around the eye.    All wounds normally drain.  The larger the wound the more drainage there will be.  After 7-10 days, you will notice the wound beginning to shrink and new skin will begin to grow.  The wound is healed when you can see skin has formed over the entire area.  A healed wound has a healthy, shiny look to the surface and is red to dark pink in color to normalize.  Wounds may take approximately  4-6 weeks to heal.  Larger wounds may take 6-8 weeks.  After the wound is healed you may discontinue dressing changes.    You may experience a sensation of tightness as your wound heals. This is normal and will gradually subside.    Your healed wound may be sensitive to temperature changes. This sensitivity improves with time, but if you re having a lot of discomfort, try to avoid temperature extremes.    Patients frequently experience itching after their wound appears to have healed because of the continue healing under the skin.  Plain Vaseline will help relieve the itching.      POSSIBLE COMPLICATIONS    BLEEDING:    Leave the bandage in place.  Use tightly rolled up gauze or a cloth to apply direct pressure over the bandage for 30  minutes.  Reapply pressure for an additional 30 minutes if necessary  Use additional gauze and tape to maintain pressure once the bleeding has stopped.

## 2023-06-01 NOTE — PROGRESS NOTES
José Antonio Saha is an extremely pleasant 73 year old year old male patient here today for hx of non-melanoma skin cancer.  HE ntoes spot on left cheek.   .   Patient states this has been present for some time. Patient reports the following symptoms:  rough.  Patient reports the following previous treatments none.  These treatments did not work.  Patient reports the following modifying factors none.  Associated symptoms: none.  Patient has no other skin complaints today.  Remainder of the HPI, Meds, PMH, Allergies, FH, and SH was reviewed in chart.      Past Medical History:   Diagnosis Date     Basal cell carcinoma      Malignant melanoma of other specified sites of skin 1980s    no recurrence     Personal history of other malignant neoplasm of skin 4/15/02    recurrent BCC scalp and face     Routine general medical examination at a health care facility 4/12/02     Scrotal varices     L scrotum       Past Surgical History:   Procedure Laterality Date     COLONOSCOPY  11/19/2013    Procedure: COMBINED COLONOSCOPY, SINGLE BIOPSY/POLYPECTOMY BY BIOPSY;  COLONOSCOPY;  Surgeon: Adam Lopez MD;  Location:  GI     COLONOSCOPY N/A 9/29/2016    Procedure: COLONOSCOPY;  Surgeon: Paul Ken MD;  Location:  GI     [facility] NONSPECIFIC PROCEDURE  6/27/02    colonoscopy with polypectomy x3, fulguration x1     Z NONSPECIFIC PROCEDURE      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     ZZ NONSPECIFIC PROCEDURE      multiple skin Bxs     [facility] NONSPECIFIC PROCEDURE      tonsillectomy        Family History   Problem Relation Age of Onset     Cancer Mother         thyroid and lung     Prostate Cancer Father 65        uncle also had prostate Ca     Cerebrovascular Disease Father      Hypertension Father      Arthritis Daughter      Skin Cancer Daughter      Breast Cancer Sister      Cancer - colorectal No family hx of      Diabetes No family hx of        Social History     Socioeconomic History     Marital status:       Spouse name: Not on file     Number of children: 3     Years of education: Not on file     Highest education level: Not on file   Occupational History     Employer: [employer]   Tobacco Use     Smoking status: Never     Smokeless tobacco: Never   Vaping Use     Vaping status: Never Used   Substance and Sexual Activity     Alcohol use: Yes     Alcohol/week: 0.0 standard drinks of alcohol     Comment: 2  drinks per week     Drug use: Not Currently     Sexual activity: Not on file   Other Topics Concern     Not on file   Social History Narrative     Not on file     Social Determinants of Health     Financial Resource Strain: Low Risk  (9/21/2021)    Overall Financial Resource Strain (CARDIA)      Difficulty of Paying Living Expenses: Not hard at all   Food Insecurity: No Food Insecurity (9/21/2021)    Hunger Vital Sign      Worried About Running Out of Food in the Last Year: Never true      Ran Out of Food in the Last Year: Never true   Transportation Needs: No Transportation Needs (9/21/2021)    PRAPARE - Transportation      Lack of Transportation (Medical): No      Lack of Transportation (Non-Medical): No   Physical Activity: Not on file   Stress: Not on file   Social Connections: Not on file   Intimate Partner Violence: Not on file   Housing Stability: Unknown (9/21/2021)    Housing Stability Vital Sign      Unable to Pay for Housing in the Last Year: No      Number of Places Lived in the Last Year: Not on file      Unstable Housing in the Last Year: No       Outpatient Encounter Medications as of 6/1/2023   Medication Sig Dispense Refill     Acetaminophen 325 MG CAPS Take 325-650 mg by mouth as needed       Amoxicillin-Pot Clavulanate (AUGMENTIN PO)        baclofen (LIORESAL) 10 MG tablet >>>IF PUMP FAILS  Take 10 mg six times a day IF PUMP FAILS<<<       docusate sodium (ENEMEEZ) 283 MG enema Place 1 enema rectally daily 90 enema 3     Ethyl Alcohol, Skin Cleanser, 62 % LIQD Apply to hands as  needed prior to self cath. 473 mL 1     saline 0.65 % (Soln) SOLN Spray in nostril as needed       senna (SENOKOT) 8.6 MG tablet Take 4 tablets by mouth daily       sertraline (ZOLOFT) 50 MG tablet Take 50 mg by mouth daily       sodium chloride (SHANNA 128) 5 % ophthalmic ointment 1 Application as needed for dry eyes       UNABLE TO FIND MEDICATION NAME: Benesys Powder, 3 tsp per day       Vitamin D, Cholecalciferol, 25 MCG (1000 UT) CAPS Take by mouth daily        No facility-administered encounter medications on file as of 6/1/2023.             O:   NAD, WDWN, Alert & Oriented, Mood & Affect wnl, Vitals stable   Here today with wife    General appearance normal   Vitals stable   Alert, oriented and in no acute distress      Following lymph nodes palpated: Occipital, Cervical, Supraclavicular no lad  L Sideburn 6mm pink pearly papule   L cheek gritty scaly papule      Stuck on papules and brown macules on trunk and ext   Red papules on trunk  Flesh colored papules on trunk     The remainder of the full exam was normal; the following areas were examined:  conjunctiva/lids, , neck, peripheral vascular system, abdomen, lymph nodes, digits/nails, eccrine and apocrine glands, scalp/hair, face, neck, chest, abdomen, buttocks, back, RUE, LUE, RLE, LLE       Eyes: Conjunctivae/lids:Normal     ENT: Lips, buccal mucosa, tongue: normal    MSK:Normal    Cardiovascular: peripheral edema none    Pulm: Breathing Normal    Lymph Nodes: No Head and Neck Lymphadenopathy     Neuro/Psych: Orientation:Alert and Orientedx3 ; Mood/Affect:normal       A/P:  1. Seborrheic keratosis, lentigo, angioma, dermal nevus, hx of melanoma, hx of skin cancer  2. L cheek actinic keratosis   LN2:  Treated with LN2 for 5s for 1-2 cycles. Warned risks of blistering, pain, pigment change, scarring, and incomplete resolution.  Advised patient to return if lesions do not completely resolve.  Wound care sheet given.  3. L sideburn r/o basal cell carcinoma    TANGENTIAL BIOPSY SENT OUT:  After consent, anesthesia with LEC and prep, tangential excision performed and specimen sent out for permanent section histology.  No complications and routine wound care. Patient told to call our office in 1-2 weeks for result.       It was a pleasure speaking to José Antonio Saha today.  Previous clinic notes and pertinent laboratory tests were reviewed prior to José Antonio Saha's visit.  Signs and Symptoms of skin cancer discussed with patient.  Patient encouraged to perform monthly skin exams.  UV precautions reviewed with patient.  Risks of non-melanoma skin cancer discussed with patient   Return to clinic next appt

## 2023-06-05 ENCOUNTER — TELEPHONE (OUTPATIENT)
Dept: DERMATOLOGY | Facility: CLINIC | Age: 73
End: 2023-06-05
Payer: MEDICARE

## 2023-06-05 LAB
PATH REPORT.COMMENTS IMP SPEC: ABNORMAL
PATH REPORT.COMMENTS IMP SPEC: ABNORMAL
PATH REPORT.COMMENTS IMP SPEC: YES
PATH REPORT.FINAL DX SPEC: ABNORMAL
PATH REPORT.GROSS SPEC: ABNORMAL
PATH REPORT.MICROSCOPIC SPEC OTHER STN: ABNORMAL
PATH REPORT.RELEVANT HX SPEC: ABNORMAL

## 2023-06-05 NOTE — TELEPHONE ENCOUNTER
Pt scheduled Mohs appt already. Called and confirmed results were what Dr. Holcomb thought they would be. Pt voiced understanding.    Thank you,  Leida KOHLI RN  Dermatology   [phone]

## 2023-06-05 NOTE — TELEPHONE ENCOUNTER
----- Message from Vandana Yap PA-C sent at 6/5/2023  2:45 PM CDT -----  Left sideburn BCC please schedule mohs

## 2023-06-11 ENCOUNTER — HEALTH MAINTENANCE LETTER (OUTPATIENT)
Age: 73
End: 2023-06-11

## 2023-07-17 ENCOUNTER — OFFICE VISIT (OUTPATIENT)
Dept: FAMILY MEDICINE | Facility: CLINIC | Age: 73
End: 2023-07-17
Payer: MEDICARE

## 2023-07-17 VITALS
BODY MASS INDEX: 23.03 KG/M2 | WEIGHT: 170 LBS | HEIGHT: 72 IN | SYSTOLIC BLOOD PRESSURE: 98 MMHG | TEMPERATURE: 98.6 F | OXYGEN SATURATION: 96 % | DIASTOLIC BLOOD PRESSURE: 64 MMHG | RESPIRATION RATE: 16 BRPM | HEART RATE: 67 BPM

## 2023-07-17 DIAGNOSIS — R94.6 ABNORMAL FINDING ON THYROID FUNCTION TEST: ICD-10-CM

## 2023-07-17 DIAGNOSIS — C61 PROSTATE CANCER (H): ICD-10-CM

## 2023-07-17 DIAGNOSIS — F43.21 ADJUSTMENT DISORDER WITH DEPRESSED MOOD: ICD-10-CM

## 2023-07-17 DIAGNOSIS — Z00.00 ROUTINE GENERAL MEDICAL EXAMINATION AT A HEALTH CARE FACILITY: Primary | ICD-10-CM

## 2023-07-17 DIAGNOSIS — G82.50 CHRONIC INCOMPLETE SPASTIC TETRAPLEGIA (H): ICD-10-CM

## 2023-07-17 DIAGNOSIS — Z12.11 COLON CANCER SCREENING: ICD-10-CM

## 2023-07-17 PROCEDURE — 99213 OFFICE O/P EST LOW 20 MIN: CPT | Mod: 25 | Performed by: INTERNAL MEDICINE

## 2023-07-17 PROCEDURE — G0439 PPPS, SUBSEQ VISIT: HCPCS | Performed by: INTERNAL MEDICINE

## 2023-07-17 RX ORDER — BUPROPION HYDROCHLORIDE 150 MG/1
150 TABLET ORAL EVERY MORNING
Qty: 90 TABLET | Refills: 3 | Status: SHIPPED | OUTPATIENT
Start: 2023-07-17 | End: 2024-09-03

## 2023-07-17 ASSESSMENT — ACTIVITIES OF DAILY LIVING (ADL)
CURRENT_FUNCTION: BATHING REQUIRES ASSISTANCE
CURRENT_FUNCTION: LAUNDRY REQUIRES ASSISTANCE
CURRENT_FUNCTION: TRANSPORTATION REQUIRES ASSISTANCE
CURRENT_FUNCTION: PREPARING MEALS REQUIRES ASSISTANCE
CURRENT_FUNCTION: SHOPPING REQUIRES ASSISTANCE
CURRENT_FUNCTION: HOUSEWORK REQUIRES ASSISTANCE

## 2023-07-17 ASSESSMENT — ENCOUNTER SYMPTOMS
ABDOMINAL PAIN: 0
JOINT SWELLING: 0
HEADACHES: 0
NAUSEA: 0
HEARTBURN: 0
CHILLS: 0
MYALGIAS: 0
WEAKNESS: 0
DIZZINESS: 0
DIARRHEA: 0
HEMATURIA: 0
NERVOUS/ANXIOUS: 0
CONSTIPATION: 0
PALPITATIONS: 0
COUGH: 0
FEVER: 0
DYSURIA: 0
SHORTNESS OF BREATH: 0
PARESTHESIAS: 0
SORE THROAT: 0
EYE PAIN: 0
ARTHRALGIAS: 1
HEMATOCHEZIA: 0
FREQUENCY: 0

## 2023-07-17 ASSESSMENT — PAIN SCALES - GENERAL: PAINLEVEL: MODERATE PAIN (4)

## 2023-07-17 NOTE — PROGRESS NOTES
"SUBJECTIVE:   Tawanda is a 73 year old who presents for Preventive Visit.       No data to display              Are you in the first 12 months of your Medicare coverage?  No    Healthy Habits:     In general, how would you rate your overall health?  Good    Frequency of exercise:  6-7 days/week    Duration of exercise:  Greater than 60 minutes    Do you usually eat at least 4 servings of fruit and vegetables a day, include whole grains    & fiber and avoid regularly eating high fat or \"junk\" foods?  Yes    Taking medications regularly:  Yes    Barriers to taking medications:  None    Medication side effects:  None    Ability to successfully perform activities of daily living:  Transportation requires assistance, shopping requires assistance, preparing meals requires assistance, housework requires assistance, bathing requires assistance and laundry requires assistance    Home Safety:  No safety concerns identified    Hearing Impairment:  Difficulty following a conversation in a noisy restaurant or crowded room    In the past 6 months, have you been bothered by leaking of urine? Yes    In general, how would you rate your overall mental or emotional health?  Good    Additional concerns today:  No    Complains of lack of energy and fatigue  He notes his CPAP suggests that his sleep apnea is being addressed, yet he still does not feel more energized      Have you ever done Advance Care Planning? (For example, a Health Directive, POLST, or a discussion with a medical provider or your loved ones about your wishes): Yes, advance care planning is on file.       Fall risk  Fallen 2 or more times in the past year?: No  Any fall with injury in the past year?: No    Cognitive Screening   1) Repeat 3 items (Leader, Season, Table)    2) Clock draw: NORMAL  3) 3 item recall: Recalls 3 objects  Results: 3 items recalled: COGNITIVE IMPAIRMENT LESS LIKELY    Mini-CogTM Copyright EFRAÍN Vazquez. Licensed by the author for use in [facility] " Services; reprinted with permission (soob@[facility]). All rights reserved.      Do you have sleep apnea, excessive snoring or daytime drowsiness?: yes    Reviewed and updated as needed this visit by clinical staff   Tobacco  Allergies  Meds      Soc Hx        Reviewed and updated as needed this visit by Provider                 Social History     Tobacco Use     Smoking status: Never     Smokeless tobacco: Never   Substance Use Topics     Alcohol use: Yes     Alcohol/week: 0.0 standard drinks of alcohol     Comment: 2  drinks per week             7/17/2023     2:53 PM   Alcohol Use   Prescreen: >3 drinks/day or >7 drinks/week? No          No data to display              Do you have a current opioid prescription? No  Do you use any other controlled substances or medications that are not prescribed by a provider? None              Current providers sharing in care for this patient include:   Patient Care Team:  Kirk Chavez MD as PCP - General (Internal Medicine)  Kirk Chavez MD as Assigned PCP  Shawn Holcomb MD as MD (Dermatology)  Shawn Holcomb MD as Assigned Surgical Provider    The following health maintenance items are reviewed in Epic and correct as of today:  Health Maintenance   Topic Date Due     AORTIC ANEURYSM SCREENING (SYSTEM ASSIGNED)  Never done     COLORECTAL CANCER SCREENING  10/08/2021     COVID-19 Vaccine (7 - Pfizer series) 02/12/2023     MEDICARE ANNUAL WELLNESS VISIT  05/05/2023     ANNUAL REVIEW OF HM ORDERS  05/05/2023     INFLUENZA VACCINE (1) 09/01/2023     FALL RISK ASSESSMENT  07/17/2024     LIPID  05/05/2027     ADVANCE CARE PLANNING  07/17/2028     DTAP/TDAP/TD IMMUNIZATION (3 - Td or Tdap) 06/11/2029     HEPATITIS C SCREENING  Completed     PHQ-2 (once per calendar year)  Completed     Pneumococcal Vaccine: 65+ Years  Completed     ZOSTER IMMUNIZATION  Completed     IPV IMMUNIZATION  Aged Out     MENINGITIS IMMUNIZATION  Aged Out     Patient Active Problem  List   Diagnosis     Special screening for malignant neoplasm of prostate     CARDIOVASCULAR SCREENING; LDL GOAL LESS THAN 160     Basal cell carcinoma of skin of trunk     Basal cell carcinoma of nose     Sessile colonic polyp     Right shoulder pain, unspecified chronicity     Cervical segment dysfunction     Right supraspinatus tendinitis     Thoracic segment dysfunction     Pancreas cyst     Prostate cancer (H)     H/O spinal cord injury     Chronic incomplete spastic tetraplegia (H)     Adjustment disorder with depressed mood     Past Surgical History:   Procedure Laterality Date     COLONOSCOPY  11/19/2013    Procedure: COMBINED COLONOSCOPY, SINGLE BIOPSY/POLYPECTOMY BY BIOPSY;  COLONOSCOPY;  Surgeon: Adam Lopez MD;  Location:  GI     COLONOSCOPY N/A 9/29/2016    Procedure: COLONOSCOPY;  Surgeon: Paul Ken MD;  Location:  GI     [facility] NONSPECIFIC PROCEDURE  6/27/02    colonoscopy with polypectomy x3, fulguration x1     [facility] NONSPECIFIC PROCEDURE      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     [facility] NONSPECIFIC PROCEDURE      multiple skin Bxs     [facility] NONSPECIFIC PROCEDURE      tonsillectomy       Social History     Tobacco Use     Smoking status: Never     Smokeless tobacco: Never   Substance Use Topics     Alcohol use: Yes     Alcohol/week: 0.0 standard drinks of alcohol     Comment: 2  drinks per week     Family History   Problem Relation Age of Onset     Cancer Mother         thyroid and lung     Prostate Cancer Father 65        uncle also had prostate Ca     Cerebrovascular Disease Father      Hypertension Father      Arthritis Daughter      Skin Cancer Daughter      Breast Cancer Sister      Cancer - colorectal No family hx of      Diabetes No family hx of          Current Outpatient Medications   Medication Sig Dispense Refill     Acetaminophen 325 MG CAPS Take 325-650 mg by mouth as needed       Amoxicillin-Pot Clavulanate (AUGMENTIN PO)        baclofen (LIORESAL) 10 MG tablet >>>IF PUMP  FAILS  Take 10 mg six times a day IF PUMP FAILS<<<       buPROPion (WELLBUTRIN XL) 150 MG 24 hr tablet Take 1 tablet (150 mg) by mouth every morning 90 tablet 3     docusate sodium (ENEMEEZ) 283 MG enema Place 1 enema rectally daily 90 enema 3     Ethyl Alcohol, Skin Cleanser, 62 % LIQD Apply to hands as needed prior to self cath. 473 mL 1     saline 0.65 % (Soln) SOLN Spray in nostril as needed       senna (SENOKOT) 8.6 MG tablet Take 4 tablets by mouth daily       sertraline (ZOLOFT) 50 MG tablet Take 50 mg by mouth daily       sodium chloride (SHANNA 128) 5 % ophthalmic ointment 1 Application as needed for dry eyes       UNABLE TO FIND MEDICATION NAME: Benesys Powder, 3 tsp per day       Vitamin D, Cholecalciferol, 25 MCG (1000 UT) CAPS Take by mouth daily        Allergies   Allergen Reactions     No Known Drug Allergy              Review of Systems   Constitutional: Negative for chills and fever.   HENT: Positive for congestion and hearing loss. Negative for ear pain and sore throat.    Eyes: Negative for pain and visual disturbance.   Respiratory: Negative for cough and shortness of breath.    Cardiovascular: Negative for chest pain, palpitations and peripheral edema.   Gastrointestinal: Negative for abdominal pain, constipation, diarrhea, heartburn, hematochezia and nausea.   Genitourinary: Positive for impotence. Negative for dysuria, frequency, hematuria, penile discharge and urgency.   Musculoskeletal: Positive for arthralgias. Negative for joint swelling and myalgias.   Skin: Negative for rash.   Neurological: Negative for dizziness, weakness, headaches and paresthesias.   Psychiatric/Behavioral: Negative for mood changes. The patient is not nervous/anxious.      Above symptom are not new    OBJECTIVE:   BP 98/64 (BP Location: Right arm, Patient Position: Chair, Cuff Size: Adult Large)   Pulse 67   Temp 98.6  F (37  C) (Temporal)   Resp 16   Ht 1.829 m (6')   Wt 77.1 kg (170 lb)   SpO2 96%   BMI 23.06  kg/m   Estimated body mass index is 23.06 kg/m  as calculated from the following:    Height as of this encounter: 1.829 m (6').    Weight as of this encounter: 77.1 kg (170 lb).  Physical Exam  General: This is a well-appearing man in wheelchair.  He is accompanied by his wife.  HEENT: The bilateral tympanic membranes appear normal, he wears bilateral hearing aids, the nasal exam and oral exams are normal.  Cardiovascular: No carotid bruits, the heart has a regular rate and rhythm without appreciable murmur gallop or rub.  Pulmonary: The lungs are clear to auscultation bilaterally, breathing does not appear to be labored.  Abdomen: There is a baclofen pump reservoir in the right lower quadrant abdomen, otherwise the abdomen is soft, not distended, not tender bowel sounds present, no obvious masses.  Extremities: The right foot is in an AFO brace, there is unchanged trace edema in the bilateral distal lower extremities.  Neurological: There isspasticity of all extremities .  Mental State: Mild flat affect, well-groomed, normal speech.        ASSESSMENT / PLAN:       ICD-10-CM    1. Routine general medical examination at a health care facility  Z00.00       2. Chronic incomplete spastic tetraplegia (H)  G82.50 Adult Neurology  Referral      3. Adjustment disorder with depressed mood  F43.21 buPROPion (WELLBUTRIN XL) 150 MG 24 hr tablet      4. Prostate cancer (H)  C61 PSA tumor marker      5. Abnormal finding on thyroid function test  R94.6 TSH with free T4 reflex      6. Colon cancer screening  Z12.11 Fecal colorectal cancer screen FIT     CANCELED: Fecal colorectal cancer screen FIT        Try adding wellbutrin to zoloft and see if this improves energy  Check TSH with upcoming labs  Follow up in 3-4 weeks to assess wellbutrin  Check PSA at 6 month interval PSA was 9 last check in March outside [facility]    Patient has been advised of split billing requirements and indicates understanding:  Yes      COUNSELING:  Reviewed preventive health counseling, as reflected in patient instructions  Special attention given to:       Consider AAA screening for ages 65-75 and smoking history       Regular exercise       Healthy diet/nutrition       Immunizations    Recommended COVID booster, he did not have time for that today             Colon cancer screening; reminded him to submit FIT test       Prostate cancer screening; Plan for PSA in November as above         He reports that he has never smoked. He has never used smokeless tobacco.      Appropriate preventive services were discussed with this patient, including applicable screening as appropriate for cardiovascular disease, diabetes, osteopenia/osteoporosis, and glaucoma.  As appropriate for age/gender, discussed screening for colorectal cancer, prostate cancer, breast cancer, and cervical cancer. Checklist reviewing preventive services available has been given to the patient.    Reviewed patients plan of care and provided an AVS. The Basic Care Plan (routine screening as documented in Health Maintenance) for José Antonio meets the Care Plan requirement. This Care Plan has been established and reviewed with the Patient and spouse.      Kirk Chavez MD  [facility]    Identified Health Risks:    I have reviewed Opioid Use Disorder and Substance Use Disorder risk factors and made any needed referrals.

## 2023-07-24 ENCOUNTER — TELEPHONE (OUTPATIENT)
Dept: NEUROLOGY | Facility: CLINIC | Age: 73
End: 2023-07-24

## 2023-07-24 NOTE — TELEPHONE ENCOUNTER
[facility] Call Center    Phone Message    May a detailed message be left on voicemail: yes     Reason for Call: Other: patient called and is requesting to only see  for his referral for Chronic incomplete spastic tetraplegia. He was referred by JON LANIER unable to schedule per protocols as 's schedule was not available and no open appointments. Please call patient back to schedule at [phone]    Action Taken: Message routed to:  Other: [facility]     Travel Screening: Not Applicable

## 2023-08-03 ENCOUNTER — OFFICE VISIT (OUTPATIENT)
Dept: DERMATOLOGY | Facility: CLINIC | Age: 73
End: 2023-08-03
Payer: MEDICARE

## 2023-08-03 DIAGNOSIS — C44.319 BASAL CELL CARCINOMA (BCC) OF SIDEBURN AREA: Primary | ICD-10-CM

## 2023-08-03 PROCEDURE — 17311 MOHS 1 STAGE H/N/HF/G: CPT | Performed by: DERMATOLOGY

## 2023-08-03 NOTE — PATIENT INSTRUCTIONS
Open Wound Care     for left sideburn      No strenuous activity for 48 hours    Take Tylenol as needed for discomfort.                                                .         Do not drink alcoholic beverages for 48 hours.    Keep the pressure bandage in place for 24 hours. If the bandage becomes blood tinged or loose, reinforce it with gauze and tape.        (Refer to the reverse side of this page for management of bleeding).    Remove bandage in 24 hours and begin wound care as follows:     Clean area with tap water using a Q tip or gauze pad, (shower / bathe normally)  Dry wound with Q tip or gauze pad  Apply Aquaphor, Vaseline, Polysporin or Bacitracin Ointment with a Q tip  Do NOT use Neosporin Ointment   Cover the wound with a band-aid or nonstick gauze pad and paper tape.  Repeat wound care once a day until wound is completely healed.    It is an old wives tale that a wound heals better when it is exposed to air and allowed to dry out. The wound will heal faster with a better cosmetic result if it is kept moist with ointment and covered with a bandage.  Do not let the wound dry out.    Supplies Needed:                Qtips or gauze pads                Vaseline, Aquaphor, Polysporin Ointment or Bacitracin Ointment (NOT NEOSPORIN)                Bandaids or nonstick gauze pads and paper tape    Wound care kits and brown paper tape are available for purchase at   the pharmacy.    BLEEDING:    Use tightly rolled up gauze or cloth to apply direct pressure over the bandage for 20   minutes.  Reapply pressure for an additional 20 minutes if necessary  Call the office or go to the nearest emergency room if pressure fails to stop the bleeding.  Use additional gauze and tape to maintain pressure once the bleeding has stopped.  Begin wound care 24 hours after surgery as directed.                WOUND HEALING    One week after surgery a pink / red halo will form around the outside of the wound.   This is new skin.  The  center of the wound will appear yellowish white and produce some drainage.  The pink halo will slowly migrate in toward the center of the wound until the wound is covered with new shiny pink skin.  There will be no more drainage when the wound is completely healed.  It will take six months to one year for the redness to fade.  The scar may be itchy, tight and sensitive to extreme temperatures for a year after the surgery.  Massaging the area several times a day for several minutes after the wound is completely healed will help the scar soften and normalize faster. Begin massage only after healing is complete.    In case of emergency call: Dr Holcomb: [phone]     [facility]: [phone]  [facility]: [phone]

## 2023-08-03 NOTE — LETTER
8/3/2023         RE: José Antonio Saha  [address]  [facility] [address]        Dear Colleague,    Thank you for referring your patient, José Antonio Saha, to the [facility]. Please see a copy of my visit note below.    Surgical Office Location:  [facility]  [address] [place] [id number]      José Antonio Saha is an extremely pleasant 73 year old year old male patient here today for evaluation and managment of basal cell carcinoma on left sideburn.  Patient has no other skin complaints today.  Remainder of the HPI, Meds, PMH, Allergies, FH, and SH was reviewed in chart.      Past Medical History:   Diagnosis Date     Basal cell carcinoma      Malignant melanoma of other specified sites of skin 1980s    no recurrence     Personal history of other malignant neoplasm of skin 4/15/02    recurrent BCC scalp and face     Routine general medical examination at a [facility] facility 4/12/02     Scrotal varices     L scrotum       Past Surgical History:   Procedure Laterality Date     COLONOSCOPY  11/19/2013    Procedure: COMBINED COLONOSCOPY, SINGLE BIOPSY/POLYPECTOMY BY BIOPSY;  COLONOSCOPY;  Surgeon: Adam Lopez MD;  Location:  GI     COLONOSCOPY N/A 9/29/2016    Procedure: COLONOSCOPY;  Surgeon: Paul Ken MD;  Location:  GI     [facility] NONSPECIFIC PROCEDURE  6/27/02    colonoscopy with polypectomy x3, fulguration x1     [facility] NONSPECIFIC PROCEDURE      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     Z NONSPECIFIC PROCEDURE      multiple skin Bxs     [facility] NONSPECIFIC PROCEDURE      tonsillectomy        Family History   Problem Relation Age of Onset     Cancer Mother         thyroid and lung     Prostate Cancer Father 65        uncle also had prostate Ca     Cerebrovascular Disease Father      Hypertension Father      Arthritis Daughter      Skin Cancer Daughter      Breast Cancer Sister      Cancer - colorectal No family hx of       Diabetes No family hx of        Social History     Socioeconomic History     Marital status:      Spouse name: Not on file     Number of children: 3     Years of education: Not on file     Highest education level: Not on file   Occupational History     Employer: [employer]   Tobacco Use     Smoking status: Never     Smokeless tobacco: Never   Vaping Use     Vaping Use: Never used   Substance and Sexual Activity     Alcohol use: Yes     Alcohol/week: 0.0 standard drinks of alcohol     Comment: 2  drinks per week     Drug use: Not Currently     Sexual activity: Not Currently     Partners: Female   Other Topics Concern     Not on file   Social History Narrative     Not on file     Social Determinants of Health     Financial Resource Strain: Low Risk  (9/21/2021)    Overall Financial Resource Strain (CARDIA)      Difficulty of Paying Living Expenses: Not hard at all   Food Insecurity: No Food Insecurity (9/21/2021)    Hunger Vital Sign      Worried About Running Out of Food in the Last Year: Never true      Ran Out of Food in the Last Year: Never true   Transportation Needs: No Transportation Needs (9/21/2021)    PRAPARE - Transportation      Lack of Transportation (Medical): No      Lack of Transportation (Non-Medical): No   Physical Activity: Not on file   Stress: Not on file   Social Connections: Not on file   Intimate Partner Violence: Not on file   Housing Stability: Unknown (9/21/2021)    Housing Stability Vital Sign      Unable to Pay for Housing in the Last Year: No      Number of Places Lived in the Last Year: Not on file      Unstable Housing in the Last Year: No       Outpatient Encounter Medications as of 8/3/2023   Medication Sig Dispense Refill     Acetaminophen 325 MG CAPS Take 325-650 mg by mouth as needed       Amoxicillin-Pot Clavulanate (AUGMENTIN PO)        baclofen (LIORESAL) 10 MG tablet >>>IF PUMP FAILS  Take 10 mg six times a day IF PUMP FAILS<<<       buPROPion  (WELLBUTRIN XL) 150 MG 24 hr tablet Take 1 tablet (150 mg) by mouth every morning 90 tablet 3     docusate sodium (ENEMEEZ) 283 MG enema Place 1 enema rectally daily 90 enema 3     Ethyl Alcohol, Skin Cleanser, 62 % LIQD Apply to hands as needed prior to self cath. 473 mL 1     saline 0.65 % (Soln) SOLN Spray in nostril as needed       senna (SENOKOT) 8.6 MG tablet Take 4 tablets by mouth daily       sertraline (ZOLOFT) 50 MG tablet Take 50 mg by mouth daily       sodium chloride (SHANNA 128) 5 % ophthalmic ointment 1 Application as needed for dry eyes       UNABLE TO FIND MEDICATION NAME: Benesys Powder, 3 tsp per day       Vitamin D, Cholecalciferol, 25 MCG (1000 UT) CAPS Take by mouth daily        No facility-administered encounter medications on file as of 8/3/2023.             O:   NAD, WDWN, Alert & Oriented, Mood & Affect wnl, Vitals stable   Here today alone   General appearance normal   Vitals stable   Alert, oriented and in no acute distress     L sideburn 6mm pink pearly papule       Eyes: Conjunctivae/lids:Normal     ENT: Lips, buccal mucosa, tongue: normal    MSK:Normal    Cardiovascular: peripheral edema none    Pulm: Breathing Normal    Neuro/Psych: Orientation:Alert and Orientedx3 ; Mood/Affect:normal       A/P:  L sideburn basal cell carcinoma   MOHS:   Location    The rationale for Mohs surgery was discussed with the patient and consent was obtained.  The risks and benefits as well as alternatives to therapy were discussed, in detail.  Specifically, the risks of infection, scarring, bleeding, prolonged wound healing, incomplete removal, allergy to anesthesia, nerve injury and recurrence were addressed.  Indication for Mohs was Location. Prior to the procedure, the treatment site was clearly identified and, if available, confirmed with previous photos and confirmed by the patient   All components of the Universal Protocol/PAUSE rule were completed.  The Mohs surgeon operated in two distinct and  integrated capacities as the surgeon and pathologist.      The area was prepped with Betasept.  A rim of normal appearing skin was marked circumferentially around the lesion.  The area was infiltrated with local anesthesia.  The tumor was first debulked to remove all clinically apparent tumor.  An incision following the standard Mohs approach was done and the specimen was oriented,mapped and placed in 1 block(s).  Each specimen was then chromacoded and processed in the Mohs laboratory using standard Mohs technique and submitted for frozen section histology.  Frozen section analysis showed no residual tumor but CLEAR MARGINS.      The tumor was excised using standard Mohs technique in 1 stages(s).  CLEAR MARGINS OBTAINED and Final defect size was 1.2cm.     He declines sutures     We discussed the options for wound management in full with the patient including risks/benefits/ possible outcomes.      REPAIR SECOND INTENT: We discussed the options for wound management in full with the patient including risks/benefits/possible outcomes. Decision made to allow the wound to heal by second intention. Cautery was used for for hemostasis. EBL minimal; complications none; wound care routine.  The patient was discharged in good condition and will return in one month or prn for wound evaluation.  It was a pleasure speaking to José Antonio Saha today.  Previous clinic notes and pertinent laboratory tests were reviewed prior to José Antonio Saha's visit.  Nature and genetics of benign skin lesions dicussed with patient.  Signs and Symptoms of skin cancer discussed with patient.  Patient encouraged to perform monthly skin exams.  UV precautions reviewed with patient.  Risks of non-melanoma skin cancer discussed with patient   Return to clinic 6 months        Again, thank you for allowing me to participate in the care of your patient.        Sincerely,        Shawn Holcomb MD

## 2023-08-03 NOTE — PROGRESS NOTES
José Antonio Saha is an extremely pleasant 73 year old year old male patient here today for evaluation and managment of basal cell carcinoma on left sideburn.  Patient has no other skin complaints today.  Remainder of the HPI, Meds, PMH, Allergies, FH, and SH was reviewed in chart.      Past Medical History:   Diagnosis Date    Basal cell carcinoma     Malignant melanoma of other specified sites of skin 1980s    no recurrence    Personal history of other malignant neoplasm of skin 4/15/02    recurrent BCC scalp and face    Routine general medical examination at a health care facility 4/12/02    Scrotal varices     L scrotum       Past Surgical History:   Procedure Laterality Date    COLONOSCOPY  11/19/2013    Procedure: COMBINED COLONOSCOPY, SINGLE BIOPSY/POLYPECTOMY BY BIOPSY;  COLONOSCOPY;  Surgeon: Adam Lopez MD;  Location:  GI    COLONOSCOPY N/A 9/29/2016    Procedure: COLONOSCOPY;  Surgeon: Paul Ken MD;  Location:  GI    [facility] NONSPECIFIC PROCEDURE  6/27/02    colonoscopy with polypectomy x3, fulguration x1    [facility] NONSPECIFIC PROCEDURE      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    [facility] NONSPECIFIC PROCEDURE      multiple skin Bxs    [facility] NONSPECIFIC PROCEDURE      tonsillectomy        Family History   Problem Relation Age of Onset    Cancer Mother         thyroid and lung    Prostate Cancer Father 65        uncle also had prostate Ca    Cerebrovascular Disease Father     Hypertension Father     Arthritis Daughter     Skin Cancer Daughter     Breast Cancer Sister     Cancer - colorectal No family hx of     Diabetes No family hx of        Social History     Socioeconomic History    Marital status:      Spouse name: Not on file    Number of children: 3    Years of education: Not on file    Highest education level: Not on file   Occupational History     Employer: [employer]   Tobacco Use    Smoking status: Never    Smokeless tobacco: Never   Vaping Use    Vaping Use: Never  used   Substance and Sexual Activity    Alcohol use: Yes     Alcohol/week: 0.0 standard drinks of alcohol     Comment: 2  drinks per week    Drug use: Not Currently    Sexual activity: Not Currently     Partners: Female   Other Topics Concern    Not on file   Social History Narrative    Not on file     Social Determinants of Health     Financial Resource Strain: Low Risk  (9/21/2021)    Overall Financial Resource Strain (CARDIA)     Difficulty of Paying Living Expenses: Not hard at all   Food Insecurity: No Food Insecurity (9/21/2021)    Hunger Vital Sign     Worried About Running Out of Food in the Last Year: Never true     Ran Out of Food in the Last Year: Never true   Transportation Needs: No Transportation Needs (9/21/2021)    PRAPARE - Transportation     Lack of Transportation (Medical): No     Lack of Transportation (Non-Medical): No   Physical Activity: Not on file   Stress: Not on file   Social Connections: Not on file   Intimate Partner Violence: Not on file   Housing Stability: Unknown (9/21/2021)    Housing Stability Vital Sign     Unable to Pay for Housing in the Last Year: No     Number of Places Lived in the Last Year: Not on file     Unstable Housing in the Last Year: No       Outpatient Encounter Medications as of 8/3/2023   Medication Sig Dispense Refill    Acetaminophen 325 MG CAPS Take 325-650 mg by mouth as needed      Amoxicillin-Pot Clavulanate (AUGMENTIN PO)       baclofen (LIORESAL) 10 MG tablet >>>IF PUMP FAILS  Take 10 mg six times a day IF PUMP FAILS<<<      buPROPion (WELLBUTRIN XL) 150 MG 24 hr tablet Take 1 tablet (150 mg) by mouth every morning 90 tablet 3    docusate sodium (ENEMEEZ) 283 MG enema Place 1 enema rectally daily 90 enema 3    Ethyl Alcohol, Skin Cleanser, 62 % LIQD Apply to hands as needed prior to self cath. 473 mL 1    saline 0.65 % (Soln) SOLN Spray in nostril as needed      senna (SENOKOT) 8.6 MG tablet Take 4 tablets by mouth daily      sertraline (ZOLOFT) 50 MG  tablet Take 50 mg by mouth daily      sodium chloride (SHANNA 128) 5 % ophthalmic ointment 1 Application as needed for dry eyes      UNABLE TO FIND MEDICATION NAME: Katerine Powder, 3 tsp per day      Vitamin D, Cholecalciferol, 25 MCG (1000 UT) CAPS Take by mouth daily        No facility-administered encounter medications on file as of 8/3/2023.             O:   NAD, WDWN, Alert & Oriented, Mood & Affect wnl, Vitals stable   Here today alone   General appearance normal   Vitals stable   Alert, oriented and in no acute distress     L sideburn 6mm pink pearly papule       Eyes: Conjunctivae/lids:Normal     ENT: Lips, buccal mucosa, tongue: normal    MSK:Normal    Cardiovascular: peripheral edema none    Pulm: Breathing Normal    Neuro/Psych: Orientation:Alert and Orientedx3 ; Mood/Affect:normal       A/P:  L sideburn basal cell carcinoma   MOHS:   Location    The rationale for Mohs surgery was discussed with the patient and consent was obtained.  The risks and benefits as well as alternatives to therapy were discussed, in detail.  Specifically, the risks of infection, scarring, bleeding, prolonged wound healing, incomplete removal, allergy to anesthesia, nerve injury and recurrence were addressed.  Indication for Mohs was Location. Prior to the procedure, the treatment site was clearly identified and, if available, confirmed with previous photos and confirmed by the patient   All components of the Universal Protocol/PAUSE rule were completed.  The Mohs surgeon operated in two distinct and integrated capacities as the surgeon and pathologist.      The area was prepped with Betasept.  A rim of normal appearing skin was marked circumferentially around the lesion.  The area was infiltrated with local anesthesia.  The tumor was first debulked to remove all clinically apparent tumor.  An incision following the standard Mohs approach was done and the specimen was oriented,mapped and placed in 1 block(s).  Each specimen was  then chromacoded and processed in the Mohs laboratory using standard Mohs technique and submitted for frozen section histology.  Frozen section analysis showed no residual tumor but CLEAR MARGINS.      The tumor was excised using standard Mohs technique in 1 stages(s).  CLEAR MARGINS OBTAINED and Final defect size was 1.2cm.     He declines sutures     We discussed the options for wound management in full with the patient including risks/benefits/ possible outcomes.      REPAIR SECOND INTENT: We discussed the options for wound management in full with the patient including risks/benefits/possible outcomes. Decision made to allow the wound to heal by second intention. Cautery was used for for hemostasis. EBL minimal; complications none; wound care routine.  The patient was discharged in good condition and will return in one month or prn for wound evaluation.  It was a pleasure speaking to José Antonio Saha today.  Previous clinic notes and pertinent laboratory tests were reviewed prior to José Antonio Saha's visit.  Nature and genetics of benign skin lesions dicussed with patient.  Signs and Symptoms of skin cancer discussed with patient.  Patient encouraged to perform monthly skin exams.  UV precautions reviewed with patient.  Risks of non-melanoma skin cancer discussed with patient   Return to clinic 6 months

## 2023-08-07 ENCOUNTER — VIRTUAL VISIT (OUTPATIENT)
Dept: FAMILY MEDICINE | Facility: CLINIC | Age: 73
End: 2023-08-07
Payer: MEDICARE

## 2023-08-07 DIAGNOSIS — Z87.828 H/O SPINAL CORD INJURY: Primary | ICD-10-CM

## 2023-08-07 DIAGNOSIS — R94.6 ABNORMAL FINDING ON THYROID FUNCTION TEST: ICD-10-CM

## 2023-08-07 DIAGNOSIS — J01.90 ACUTE SINUSITIS WITH SYMPTOMS > 10 DAYS: ICD-10-CM

## 2023-08-07 PROCEDURE — 99207 REFERRAL TO ACUTE AND DIAGNOSTIC SERVICES: CPT | Performed by: INTERNAL MEDICINE

## 2023-08-07 NOTE — PROGRESS NOTES
Tawanda is a 73 year old who is being evaluated via a billable telephone visit.      What phone number would you like to be contacted at? [phone]   How would you like to obtain your AVS? [employer]    Distant Location (provider location):  On-site    Assessment & Plan     H/O spinal cord injury  Hopefully, he can be seen early in the day by [facility] so that an MRI can be obtained and then he can have his baclofen pump checked out later in the day.  I discussed this case with the ADS provider.  They will coordinate with the hospital radiology department to hopefully expedite imaging.  I also suggested that he try calling the [facility] imaging center rather than waiting for a call to see if they can get him scheduled this week for the tests ordered by his PMNR physician.  We discussed going to the emergency department as well to expedite imaging, but he would prefer to avoid that if the [facility] is a possibility.    Abnormal finding on thyroid function test  We discussed that mildly high TSH findings are common in older adults and rather than starting treatment, I think it would be reasonable to recheck in a 6-week interval.  If the TSH remains elevated, consider carefully starting a low-dose of levothyroxine to address his sleepiness symptoms that were discussed at his last visit.  He then went on to describe a fullness in his anterior neck and wonders if he should have a thyroid ultrasound?  This would be reasonable to evaluate for goiter or thyroid nodule that may change the management strategy regarding his abnormal thyroid function tests.  - **TSH with free T4 reflex FUTURE 2mo; Future  - US Thyroid; Future    Acute sinusitis with symptoms > 10 days  Finally, since his symptoms have been present for at least over a week, I think it is reasonable to start antibiotics for suspicion of possible sinusitis as a complication of what sounds like a viral upper respiratory illness.  A prescription was sent to his pharmacy.      No LOS  data to display   Time spent by me doing chart review, history and exam, documentation and further activities per the note           Kirk Chavez MD  [facility] ENZO Neff is a 73 year old, presenting for the following health issues:  Thyroid Problem and Sinus Problem (Persistent sinus congestion x since July 13th - has taken Musinex)      HPI       73-year-old man with cervical spine injury  He scheduled this telephone appointment to discuss mildly abnormal thyroid function tests  However, he and his wife describe progressive weakness over the course of several weeks  He discussed this with his PMNR doctor, and an MRI was ordered  However, they are distressed because they have not heard from any MRI schedulers  They have not called the imaging center themselves yet  However, since they are meeting with the PMNR physician, he feels that his symptoms are worsening and thinks that urgent imaging is important  He would need to have the MRI done early in the day so that his baclofen pump could be rechecked following the MRI  I arrange for him to be seen in the ADS today, then called him back to discuss the procedure for that  He then related that he has had over a week of sinus drainage since catching a cold from his grandchild and wonders if he should get started on antibiotics?  He has not had a fever      Review of Systems         Objective    Vitals - Patient Reported  Pain Score: No Pain (0)      Vitals:  No vitals were obtained today due to virtual visit.    Physical Exam   healthy, alert, and no distress  PSYCH: Alert and oriented times 3; coherent speech, normal   rate and volume, able to articulate logical thoughts, able   to abstract reason, no tangential thoughts, no hallucinations   or delusions  His affect is normal  RESP: No cough, no audible wheezing, able to talk in full sentences  Remainder of exam unable to be completed due to telephone visits          Phone call  duration: 25 minutes

## 2023-08-08 ENCOUNTER — ANCILLARY PROCEDURE (OUTPATIENT)
Dept: ULTRASOUND IMAGING | Facility: CLINIC | Age: 73
End: 2023-08-08
Attending: INTERNAL MEDICINE
Payer: MEDICARE

## 2023-08-08 DIAGNOSIS — R94.6 ABNORMAL FINDING ON THYROID FUNCTION TEST: ICD-10-CM

## 2023-08-08 PROCEDURE — 76536 US EXAM OF HEAD AND NECK: CPT

## 2023-08-08 NOTE — RESULT ENCOUNTER NOTE
The following letter pertains to your most recent diagnostic tests:    Good news!  The thyroid ultrasound looks normal.  I do not see an MRI report in the [facility] system.  Were you able to connect with the [facility]?  Please reply if you are having trouble scheduling the MRI scans.      Sincerely,    Dr. Chavez

## 2023-08-11 ENCOUNTER — HOSPITAL ENCOUNTER (OUTPATIENT)
Dept: MRI IMAGING | Facility: CLINIC | Age: 73
Discharge: HOME | End: 2023-08-11
Attending: INTERNAL MEDICINE
Payer: MEDICARE

## 2023-08-11 ENCOUNTER — VIRTUAL VISIT (OUTPATIENT)
Dept: FAMILY MEDICINE | Facility: CLINIC | Age: 73
End: 2023-08-11
Payer: MEDICARE

## 2023-08-11 DIAGNOSIS — Z98.890 S/P INSERTION OF INTRATHECAL PUMP: ICD-10-CM

## 2023-08-11 DIAGNOSIS — G82.50 CHRONIC INCOMPLETE SPASTIC TETRAPLEGIA (H): ICD-10-CM

## 2023-08-11 DIAGNOSIS — Z87.828 H/O SPINAL CORD INJURY: ICD-10-CM

## 2023-08-11 DIAGNOSIS — Z87.828 H/O SPINAL CORD INJURY: Primary | ICD-10-CM

## 2023-08-11 PROCEDURE — 72156 MRI NECK SPINE W/O & W/DYE: CPT | Mod: MG

## 2023-08-11 PROCEDURE — 255N000002 HC RX 255 OP 636: Performed by: INTERNAL MEDICINE

## 2023-08-11 PROCEDURE — 99441 PR PHYSICIAN TELEPHONE EVALUATION 5-10 MIN: CPT | Mod: 95 | Performed by: PHYSICIAN ASSISTANT

## 2023-08-11 PROCEDURE — G1010 CDSM STANSON: HCPCS

## 2023-08-11 PROCEDURE — A9585 GADOBUTROL INJECTION: HCPCS | Performed by: INTERNAL MEDICINE

## 2023-08-11 RX ORDER — GADOBUTROL 604.72 MG/ML
8 INJECTION INTRAVENOUS ONCE
Status: COMPLETED | OUTPATIENT
Start: 2023-08-11 | End: 2023-08-11

## 2023-08-11 RX ADMIN — GADOBUTROL 8 ML: 604.72 INJECTION INTRAVENOUS at 09:14

## 2023-08-11 NOTE — PROGRESS NOTES
Tawanda is a 73 year old who is being evaluated via a billable telephone visit.      What phone number would you like to be contacted at? [phone]  How would you like to obtain your AVS? [employer]    Distant Location (provider location):  On-site    Assessment & Plan     H/O spinal cord injury  Based on my review of his MRI studies everything is stable.  Thoracic region reveals vertebral body compression deformity which appears chronic.  No significant spinal canal stenosis at any level.  Cervical region shows areas of myelomalacia -.  Be present from his last study in January 2022.  Postsurgical changes in multilevel spondylolisthesis, no high-grade stenosis.  In summary MRI appears stable.  Close follow-up with physical medicine/chandni Fernandez team early next week.  Will CC PCP on report.  Upcoming appointment with him on 8/24/2023.  They have no further questions at this time.    15 minutes spent by me on the date of the encounter doing chart review, review of test results, interpretation of tests, patient visit, and documentation        The likelihood of other entities in the differential is insufficient to justify any further testing for them at this time. This was explained to the patient. The patient was advised that persistent or worsening symptoms would require further evaluation. Patient advised to call the office and if unable to reach to go to the emergency room if they develop any new or worsening symptoms. Expressed understanding and agreement with above stated plan.     ABAD [facility] ENZO Pedersen   Tawanda is a 73 year old, presenting for the following health issues:  MRI Transcription    Here today to discuss recent MRI.  History of spinal cord injury.  Recent visit on 8/7/2023 with PCP given him and his wife describing progressive weakness over the past several weeks.  ADLs worsening.  His provider at [facility] expressed her concern over this as well.  Stable  progression at this point since their visit.    Recent thyroid ultrasound as well.  Upcoming for repeat blood work.    Still on Augmentin for sinusitis.  Improvement in drainage.      Review of Systems   Constitutional, HEENT, cardiovascular, pulmonary, GI, , musculoskeletal, neuro, skin, endocrine and psych systems are negative, except as otherwise noted.      Objective    Vitals - Patient Reported  Systolic (Patient Reported): 122  Diastolic (Patient Reported): 76      Vitals:  No vitals were obtained today due to virtual visit.    Physical Exam   healthy, alert, and no distress  PSYCH: Alert and oriented times 3; coherent speech, normal   rate and volume, able to articulate logical thoughts, able   to abstract reason, no tangential thoughts, no hallucinations   or delusions  His affect is normal  RESP: No cough, no audible wheezing, able to talk in full sentences  Remainder of exam unable to be completed due to telephone visits    MR Cervical Spine w/o & w Contrast    Result Date: 8/11/2023  MR CERVICAL SPINE WITHOUT AND WITH CONTRAST  8/11/2023 8:34 AM HISTORY: History of spinal cord injury. Chronic incomplete spastic tetraplegia (H). Status post insertion of intrathecal pump.   COMPARISON: No prior images available for review however a prior report from 1/27/2022 stated as follows: 1. Several small focal zones of myelomalacia within the spinal cord at the C5-C6 level are new see prior outside examination 06/11/2019. Interval decrease in cord expansion and hazy T2 and STIR signal hyperintensity within the cervical spinal cord centered at the C5-C6 level. 2. Interval postoperative changes C5, C6, and C7 laminectomies with decompression of the thecal sac. New bilateral posterior lori and screw fixation C5-C7. 3. Severe left and moderate right neural foraminal stenosis at the C4-C5 level. 4. Moderate to severe right and moderate left neural foraminal stenosis at the C5-C6 level. 5. Posterior disc osteophyte  complexes efface the ventral thecal sac at the C3-C4, C4-C5, C5-C6, and C6-C7 levels. TECHNIQUE: Multiplanar, multisequence MR images of the cervical spine were obtained without and with intravenous contrast. CONTRAST: 8 mL Gadavist. FINDINGS: Dorsally oriented T2 hyperintense foci within the cord at the level of C5-C6 intervertebral disc and C6 vertebral body (sagittal series 5, image 8; axial series 11, images 29 and 31 respectively). Changes are of C5-C7 posterior pedicle screw, lori instrumentation and canal decompression. No signal changes to suggest acute fracture or traumatic vertebral body subluxation. No suspicious marrow lesion. No prevertebral edema. Nonfocal extraspinal structures. No abnormal focus of enhancement on postcontrast images.  The findings on a level by level basis are as follows: C2-C3: Normal disc. Normal facets. No significant spinal canal or neural foraminal stenosis. C3-C4: Loss of disc height posteriorly and intradiscal T2 signal. Bilateral uncovertebral spurring and facet hypertrophy causing severe left and moderate right neural foraminal stenosis and mild canal stenosis. C4-C5: Disc osteophyte complex. Posterior decompression. Marked left facet hypertrophy causing moderate left neural foraminal stenosis. Mild right neural foraminal stenosis secondary to mild facet hypertrophy. No significant spinal canal stenosis. C5-C6: Moderate loss of disc height, intradiscal T2 signal. Posterior decompression. Bilateral facet hypertrophy causing mild bilateral neural foraminal stenosis. C6-C7: Moderate loss of disc height, intradiscal T2 signal. Posterior decompression. Symmetric disc bulge causing mild bilateral neural foraminal stenosis. No significant spinal canal stenosis. C7-T1: Normal disc. Normal facets. No significant spinal canal or neural foraminal stenosis.     IMPRESSION: 1. Dorsally oriented areas of myelomalacia within the cervical cord at C5-C6 and C6 intervertebral disc and  vertebral body level respectively. These are chronic given a previous report noted their presence on 1/27/2022. No comparison images were available for review at the time of this dictation. 2. Postsurgical changes and multilevel cervical spondylosis as detailed. No high-grade canal spinal stenosis. No pathologic postcontrast enhancement.  ESTHELA DUKES DO   SYSTEM ID:  [id number]    MR Thoracic Spine w/o & w Contrast    Result Date: 8/11/2023  MR THORACIC SPINE WITHOUT AND WITH CONTRAST  8/11/2023 8:36 AM HISTORY: History of spinal cord injury. Chronic incomplete spastic tetraplegia (H). Status post insertion of intrathecal pump.   COMPARISON: None available. TECHNIQUE: Multiplanar, multisequence MRI images of the thoracic spine were obtained without and with intravenous contrast. CONTRAST: 8 mL Gadavist. FINDINGS: Chronic appearing T7 vertebral body compression deformity with approximately 50-70% vertebral body height loss, with associated mild kyphotic deformity at this level. No signal changes to suggest acute fracture or traumatic subluxation. No suspicious marrow lesion. Evaluation of the thoracic cord is limited secondary to motion artifact. No significant spinal canal or neuroforaminal stenosis. Preserved intervertebral disc height. Nonfocal extraspinal structures. No abnormal focus of enhancement on postcontrast images.     IMPRESSION: 1. Chronic T7 vertebral body compression deformity. No significant spinal canal stenosis at any level. 2. Axial T2-weighted images are limited secondary to motion and cannot adequately assess for small foci of abnormal thoracic cord signal. No pathologic postcontrast enhancement. ESTHELA DUKES DO   SYSTEM ID:  [id number]    US Thyroid    Result Date: 8/8/2023  EXAM: US THYROID LOCATION: [facility] DATE: 8/8/2023 INDICATION: Abnormal thyroid function test. Pressure on throat. COMPARISON: None. TECHNIQUE: Thyroid ultrasound. FINDINGS: RIGHT lobe: 4.3 x  1.7 x 2.0 cm. Slightly heterogeneous. Isthmus: 3 mm. LEFT lobe: 4.3 x 1.5 x 1.3 cm. Homogeneous echotexture. NECK: No cervical lymphadenopathy or focal mass. No discrete nodule identified.     IMPRESSION: 1.  No sonographic abnormality within the neck. 2.  Slight heterogeneity of the right thyroid lobe, which is otherwise normal in size. No discrete nodule.        Phone call duration: 10 minutes

## 2023-08-12 NOTE — RESULT ENCOUNTER NOTE
Olegario Chou,    I have had the opportunity to review your recent results and an interpretation is as follows:    Chronic appearing myelomalacia of C5-C6 as well as postsurgical changes and multilevel cervical spondylosis without any high-grade stenosis    I recommend follow-up with your physical medicine and rehabilitation specialist as planned    Sincerely,  Sergio Bolivar MD

## 2023-08-12 NOTE — RESULT ENCOUNTER NOTE
Olegario Chou,    I have had the opportunity to review your recent results and an interpretation is as follows:  Notable chronic compression deformity at T7 without significant spinal canal stenosis.  Again recommend follow-up with physical medicine and rehabilitation clinic    Sincerely,  Sergio Bolivar MD

## 2023-08-14 ENCOUNTER — MYC MEDICAL ADVICE (OUTPATIENT)
Dept: FAMILY MEDICINE | Facility: CLINIC | Age: 73
End: 2023-08-14
Payer: MEDICARE

## 2023-08-14 DIAGNOSIS — Z87.828 H/O SPINAL CORD INJURY: Primary | ICD-10-CM

## 2023-08-14 NOTE — TELEPHONE ENCOUNTER
See MyChart from patient needing provider review.   Please write back directly to pt or advise if clinic follow up needed.     Olivia KENNY, RN  [facility] RN Triage Team

## 2023-08-14 NOTE — TELEPHONE ENCOUNTER
Can you call radiology and ask them to addend the report regarding the location of the intrathecal baclofen pump catheter tip?  You can page me out of a room if they want to talk.  (This can wait until tomorrow)

## 2023-08-16 NOTE — TELEPHONE ENCOUNTER
Place to radiology.   Radiologist reviewed MRI and is unable to confirm, due to motion, identifying an intrathecal baclofen pump catheter tip on MRI Thoracic Spine 8/11/23.     Radiologist advises PCP place order for a T-spine xray, in order to visualize and confirm the catheter tip and location.       Thank you,  Nova DUONG, RN      August 16, 2023  11:15 AM

## 2023-08-18 ENCOUNTER — ANCILLARY PROCEDURE (OUTPATIENT)
Dept: GENERAL RADIOLOGY | Facility: CLINIC | Age: 73
End: 2023-08-18
Attending: INTERNAL MEDICINE
Payer: MEDICARE

## 2023-08-18 DIAGNOSIS — Z87.828 H/O SPINAL CORD INJURY: ICD-10-CM

## 2023-08-18 PROCEDURE — 72070 X-RAY EXAM THORAC SPINE 2VWS: CPT | Mod: TC | Performed by: RADIOLOGY

## 2023-08-18 NOTE — RESULT ENCOUNTER NOTE
The following letter pertains to your most recent diagnostic tests:    The x-ray looks stable, but does not show the catheter tip.  You should contact the PMNR team to discuss what next steps should be for pump management.        Sincerely,    Dr. Chavez

## 2023-08-18 NOTE — PROGRESS NOTES
Images pushed/results faxed per patient request:  Dr Tod Luevano, [facility]  Fax #[phone]    Dr Namrata Tate, [facility]  Fax #[phone]    RT Elsie (R)

## 2023-08-22 ENCOUNTER — MEDICAL CORRESPONDENCE (OUTPATIENT)
Dept: HEALTH INFORMATION MANAGEMENT | Facility: CLINIC | Age: 73
End: 2023-08-22

## 2023-09-11 ENCOUNTER — HOSPITAL ENCOUNTER (OUTPATIENT)
Dept: BONE DENSITY | Facility: CLINIC | Age: 73
Discharge: HOME | End: 2023-09-11
Attending: PHYSICAL MEDICINE & REHABILITATION | Admitting: PHYSICAL MEDICINE & REHABILITATION
Payer: MEDICARE

## 2023-09-11 DIAGNOSIS — S14.154A: ICD-10-CM

## 2023-09-11 PROCEDURE — 77080 DXA BONE DENSITY AXIAL: CPT

## 2023-09-21 ENCOUNTER — OFFICE VISIT (OUTPATIENT)
Dept: NEUROLOGY | Facility: CLINIC | Age: 73
End: 2023-09-21
Payer: MEDICARE

## 2023-09-21 VITALS — HEART RATE: 70 BPM | OXYGEN SATURATION: 94 % | SYSTOLIC BLOOD PRESSURE: 106 MMHG | DIASTOLIC BLOOD PRESSURE: 66 MMHG

## 2023-09-21 DIAGNOSIS — G47.33 OBSTRUCTIVE SLEEP APNEA SYNDROME: Primary | ICD-10-CM

## 2023-09-21 DIAGNOSIS — G82.50 CHRONIC INCOMPLETE SPASTIC TETRAPLEGIA (H): ICD-10-CM

## 2023-09-21 DIAGNOSIS — G56.03 BILATERAL CARPAL TUNNEL SYNDROME: ICD-10-CM

## 2023-09-21 PROCEDURE — 99205 OFFICE O/P NEW HI 60 MIN: CPT | Performed by: PSYCHIATRY & NEUROLOGY

## 2023-09-21 NOTE — LETTER
9/21/2023        RE: José Antonio Saha  [address]  [place] [address]            [facility]    José Antonio Saha MRN# [id number]   Age: 73 year old YOB: 1950     Requesting physician: Kirk Oconnell            Assessment and Plan:   Assessment:   Incomplete tetraplegia due to spinal injury with cervical fracture and cord injury at C5 4 years ago  Recognition of changing symptoms and function of his spinal cord with slowly progressive dysesthesias in his distal lower extremities, progressing up to his neck region over the past several years, followed by worsening anesthesia in the same distribution and course as the dysesthesias.  He in addition recognizes increased difficulty with gait, diminished walking capacity and stamina and tightness in his chest musculature over the past 9 months.  Not found related to further spinal cord damage upon review of cervical and thoracic MR imaging  Worsening right hand dexterity - possibly related to recent findings of CTS  Persistent difficulties with ALL and excessive daytime sleepiness     Plan:   Continued spasticity management with intrathecal baclofen and episodic botox  Consider right sided (or bilateral) CTS release  F/U with pulmonary medicine regarding further management of ALL and current efficacy of his BiPAP.    After going over his chart notes, his imaging studies, EMG reports, his history, and his clinical exam, I would be drawn to the following conclusions:    I do not find neurologic evidence for ongoing or new threats to his spinal cord.  I suspect that he is settling into the residuals of his original spinal cord injury.  I suspect that the sensory and motor difficulties although suggesting progression of his original injury, are representative instead of the trauma 4 years ago.  I suspect that worsening effects of spasticity despite appropriate interventions remain the likely source of his decline.  It  appears that a number of modifications have been made in his medications to no avail and that there is no imaging evidence of new injury.    With regard to his upper extremity function, I do not think I had identify evidence of rootlet origin pathology.  I would wonder if perhaps the carpal tunnel syndromes are adding some impairment as well and it might be worth decompressing without necessarily having expectation of improvement in his function.    I am not certain that there is any utility in reassessing his sleep apnea but he is certainly not clinically restored and well rested despite the use of the BiPAP thus far.  I did recommend that he perhaps seek an updated review from Dr. Lopes or his clinic.    Unfortunately I do not identify any neurological area in which substantial improvement might be forthcoming.             History of Present Illness:   CC:  I met with Mr. Styles and his wife, Steffanie, for an hour today for neurological review of symptoms suggestive of decline of his spinal cord function.  His history is quite extensive and complicated.  In general summary, he sustained a traumatic injury to his cervical spinal cord in June 2019 requiring decompression stabilization performed by Dr. Walter Luevano, neurosurgeon at [facility].  Postoperatively, he was stable and referred on to the [facility] where he has been under the capable care of Dr. Namrata Tate.  He has been an active participant in rehab services and made initial progress in terms of his strength, tone, flexibility, gait, and stamina suggesting some improvement in his spinal cord function.  He has since however been having increasing problems with spasticity treated with an intrathecal baclofen pump as well as 3-month visits for Botox injections.  Unfortunately, he has been developing some symptoms over the past 2 years or perhaps longer, of worsening spinal cord function with recognition of a sense of numbness and  tingling which began in his distal lower extremities progressing all the way up to his neck and more recently followed by a sense of numbness or anesthesia in the same distribution with the same progression.  At the same time, his activity level which permits him to get up out of the his wheelchair and ambulate in a circuit of approximately 120 feet around his house, has found that this capacity has been diminishing to the extent that now it takes him almost 3 times as long to complete 1 loop and after that he is completely exhausted and cannot function any further.  As result, further evaluation of his spinal cord was performed this past August with updated imaging of his cervical and thoracic spine.  His fractures and degenerative changes have been stable as has been his spinal cord injury based on those studies.    Further review was undertaken for complaints of worsening upper extremity function particularly in his right arm by having him go through an EMG of both upper extremities.  The symptom of arm dysfunction involves having impaired dexterity in his right hand with diminished ability to control eating utensils.  The EMG disclosed some chronic changes in the C5, C6 and C7 rootlets, bilateral ulnar neuropathies, mild on the right and significant on the left (despite having a left ulnar nerve transposition 1-1/2 years ago).  It also showed however bilateral carpal tunnel syndrome, moderate on the right and severe on the left.  There is no ongoing evidence of rootlet compromise beyond the chronic changes.    He has also had ongoing problems with recurrent bladder infections treated with courses of antibiotics, most recently completing a course of Cipro for an infection this past August.  There have not been other symptoms of progressive neurological decline.    He is also known to have obstructive sleep apnea.  This was diagnosed 9 months ago following a polysomnogram as requested by Dr. Sandeep Lopes.  BiPAP  has been implemented which has reduced his nocturnal awakenings from 50 or 60/h down to 16 or 17/h.  He has not had follow-up with Dr. Lopes but has had some phone contact with nurse practitioner Suly House in that clinic.  Although he has not met with her recently, there appears to be a request for preapproval of modafinil, presumably with thought to attenuating his excessive daytime sleepiness.  There appears to be an order for the modafinil but Mr. Styles was unaware of its existence and has not taken it.    In addition, Zoloft which she had been on for several years was recently discontinued and substituted for with Wellbutrin by Dr. Chavez has thoughts of Zoloft interference with his nervous system function might have been in play but did not seem to make any difference.  His baclofen infusion through the intrathecal pump has been modified both up and down which does not seem to have made a difference in his spinal cord symptoms of numbness, tingling, or walking capacity/stamina.  There has not seem to have been any impact with the use of the Botox injection.               Physical Exam:   His current clinical examination reveals him to be seated in his motorized wheelchair.  Although he has good power of his deltoids biceps and triceps, distal forearm and hand weakness is quite prominent.  He has bilateral wrist drops, his hands and fingers are curled into a permanent  like state.  I am able to open his fingers fully but he has no capacity to do so independently.  His lower extremities are quite weak.  He has no ability to elevate the legs with the iliopsoas muscles.  His right leg is weaker than the left, both are significantly impaired with spasticity.  He has bilateral AFOs the right side for ankle foot drop in the left side to protect an injury to his ankle.  He is ability to contract the left hamstring but not the right.  He is generally areflexic throughout.  He has atrophy of the left opponens  pollicis but not so on the right although both are quite weak.         Data:   All laboratory data reviewed  All imaging studies reviewed by me             DATA for DOCUMENTATION:         Past Medical History:     Patient Active Problem List   Diagnosis     Special screening for malignant neoplasm of prostate     CARDIOVASCULAR SCREENING; LDL GOAL LESS THAN 160     Basal cell carcinoma of skin of trunk     Basal cell carcinoma of nose     Sessile colonic polyp     Right shoulder pain, unspecified chronicity     Cervical segment dysfunction     Right supraspinatus tendinitis     Thoracic segment dysfunction     Pancreas cyst     Prostate cancer (H)     H/O spinal cord injury     Chronic incomplete spastic tetraplegia (H)     Adjustment disorder with depressed mood     Past Medical History:   Diagnosis Date     Basal cell carcinoma      Malignant melanoma of other specified sites of skin 1980s    no recurrence     Personal history of other malignant neoplasm of skin 4/15/02    recurrent BCC scalp and face     Routine general medical examination at a health care facility 4/12/02     Scrotal varices     L scrotum       Also see scanned health assessment forms.       Past Surgical History:     Past Surgical History:   Procedure Laterality Date     COLONOSCOPY  11/19/2013    Procedure: COMBINED COLONOSCOPY, SINGLE BIOPSY/POLYPECTOMY BY BIOPSY;  COLONOSCOPY;  Surgeon: Adam Lopez MD;  Location:  GI     COLONOSCOPY N/A 9/29/2016    Procedure: COLONOSCOPY;  Surgeon: Paul Ken MD;  Location:  GI     [facility] NONSPECIFIC PROCEDURE  6/27/02    colonoscopy with polypectomy x3, fulguration x1     [facility] NONSPECIFIC PROCEDURE      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     Z NONSPECIFIC PROCEDURE      multiple skin Bxs     [facility] NONSPECIFIC PROCEDURE      tonsillectomy            Social History:     Social History     Socioeconomic History     Marital status:      Spouse name: Not on file     Number of children:  3     Years of education: Not on file     Highest education level: Not on file   Occupational History     Employer: [employer]   Tobacco Use     Smoking status: Never     Smokeless tobacco: Never   Vaping Use     Vaping Use: Never used   Substance and Sexual Activity     Alcohol use: Yes     Alcohol/week: 0.0 standard drinks of alcohol     Comment: 2  drinks per week     Drug use: Not Currently     Sexual activity: Not Currently     Partners: Female   Other Topics Concern     Not on file   Social History Narrative     Not on file     Social Determinants of Health     Financial Resource Strain: Low Risk  (9/21/2021)    Overall Financial Resource Strain (CARDIA)      Difficulty of Paying Living Expenses: Not hard at all   Food Insecurity: No Food Insecurity (9/21/2021)    Hunger Vital Sign      Worried About Running Out of Food in the Last Year: Never true      Ran Out of Food in the Last Year: Never true   Transportation Needs: No Transportation Needs (9/21/2021)    PRAPARE - Transportation      Lack of Transportation (Medical): No      Lack of Transportation (Non-Medical): No   Physical Activity: Not on file   Stress: Not on file   Social Connections: Not on file   Interpersonal Safety: Not on file   Housing Stability: Unknown (9/21/2021)    Housing Stability Vital Sign      Unable to Pay for Housing in the Last Year: No      Number of Places Lived in the Last Year: Not on file      Unstable Housing in the Last Year: No              Family History:     Family History   Problem Relation Age of Onset     Cancer Mother         thyroid and lung     Prostate Cancer Father 65        uncle also had prostate Ca     Cerebrovascular Disease Father      Hypertension Father      Arthritis Daughter      Skin Cancer Daughter      Breast Cancer Sister      Cancer - colorectal No family hx of      Diabetes No family hx of             Medications:     Current Outpatient Medications   Medication Sig     Acetaminophen  325 MG CAPS Take 325-650 mg by mouth as needed     amoxicillin-clavulanate (AUGMENTIN) 875-125 MG tablet Take 1 tablet by mouth 2 times daily     baclofen (LIORESAL) 10 MG tablet >>>IF PUMP FAILS  Take 10 mg six times a day IF PUMP FAILS<<<     buPROPion (WELLBUTRIN XL) 150 MG 24 hr tablet Take 1 tablet (150 mg) by mouth every morning     docusate sodium (ENEMEEZ) 283 MG enema Place 1 enema rectally daily     Ethyl Alcohol, Skin Cleanser, 62 % LIQD Apply to hands as needed prior to self cath.     saline 0.65 % (Soln) SOLN Spray in nostril as needed     senna (SENOKOT) 8.6 MG tablet Take 4 tablets by mouth daily     sodium chloride (SHANNA 128) 5 % ophthalmic ointment 1 Application as needed for dry eyes     UNABLE TO FIND MEDICATION NAME: Benesys Powder, 3 tsp per day     Vitamin D, Cholecalciferol, 25 MCG (1000 UT) CAPS Take by mouth daily      No current facility-administered medications for this visit.              Review of Systems:   A comprehensive 10 point review of systems (constitutional, ENT, cardiac, peripheral vascular, lymphatic, respiratory, GI, , Musculoskeletal, skin, Neurological) was performed and found to be negative except as described in this note.     See intake form completed by patient       Sincerely,        Nas Bolivar MD, MD

## 2023-09-21 NOTE — LETTER
9/21/2023         RE: José Antonio Saha  [address]  [place] [address]        Dear Colleague,    Thank you for referring your patient, José Antonio Saha, to the [facility]. Please see a copy of my visit note below.        [facility]    José Antonio Saha MRN# [id number]   Age: 73 year old YOB: 1950     Requesting physician: Kirk Oconnell            Assessment and Plan:   Assessment:   Incomplete tetraplegia due to spinal injury with cervical fracture and cord injury at C5 4 years ago  Recognition of changing symptoms and function of his spinal cord with slowly progressive dysesthesias in his distal lower extremities, progressing up to his neck region over the past several years, followed by worsening anesthesia in the same distribution and course as the dysesthesias.  He in addition recognizes increased difficulty with gait, diminished walking capacity and stamina and tightness in his chest musculature over the past 9 months.  Not found related to further spinal cord damage upon review of cervical and thoracic MR imaging  Worsening right hand dexterity - possibly related to recent findings of CTS  Persistent difficulties with ALL and excessive daytime sleepiness     Plan:   Continued spasticity management with intrathecal baclofen and episodic botox  Consider right sided (or bilateral) CTS release  F/U with pulmonary medicine regarding further management of ALL and current efficacy of his BiPAP.    After going over his chart notes, his imaging studies, EMG reports, his history, and his clinical exam, I would be drawn to the following conclusions:    I do not find neurologic evidence for ongoing or new threats to his spinal cord.  I suspect that he is settling into the residuals of his original spinal cord injury.  I suspect that the sensory and motor difficulties although suggesting progression of his original injury, are  representative instead of the trauma 4 years ago.  I suspect that worsening effects of spasticity despite appropriate interventions remain the likely source of his decline.  It appears that a number of modifications have been made in his medications to no avail and that there is no imaging evidence of new injury.    With regard to his upper extremity function, I do not think I had identify evidence of rootlet origin pathology.  I would wonder if perhaps the carpal tunnel syndromes are adding some impairment as well and it might be worth decompressing without necessarily having expectation of improvement in his function.    I am not certain that there is any utility in reassessing his sleep apnea but he is certainly not clinically restored and well rested despite the use of the BiPAP thus far.  I did recommend that he perhaps seek an updated review from Dr. Lopes or his clinic.    Unfortunately I do not identify any neurological area in which substantial improvement might be forthcoming.             History of Present Illness:   CC:  I met with Mr. Styles and his wife, Steffanie, for an hour today for neurological review of symptoms suggestive of decline of his spinal cord function.  His history is quite extensive and complicated.  In general summary, he sustained a traumatic injury to his cervical spinal cord in June 2019 requiring decompression stabilization performed by Dr. Walter Luevano, neurosurgeon at [facility].  Postoperatively, he was stable and referred on to the [facility] where he has been under the capable care of Dr. Namrata Tate.  He has been an active participant in rehab services and made initial progress in terms of his strength, tone, flexibility, gait, and stamina suggesting some improvement in his spinal cord function.  He has since however been having increasing problems with spasticity treated with an intrathecal baclofen pump as well as 3-month visits for Botox  injections.  Unfortunately, he has been developing some symptoms over the past 2 years or perhaps longer, of worsening spinal cord function with recognition of a sense of numbness and tingling which began in his distal lower extremities progressing all the way up to his neck and more recently followed by a sense of numbness or anesthesia in the same distribution with the same progression.  At the same time, his activity level which permits him to get up out of the his wheelchair and ambulate in a circuit of approximately 120 feet around his house, has found that this capacity has been diminishing to the extent that now it takes him almost 3 times as long to complete 1 loop and after that he is completely exhausted and cannot function any further.  As result, further evaluation of his spinal cord was performed this past August with updated imaging of his cervical and thoracic spine.  His fractures and degenerative changes have been stable as has been his spinal cord injury based on those studies.    Further review was undertaken for complaints of worsening upper extremity function particularly in his right arm by having him go through an EMG of both upper extremities.  The symptom of arm dysfunction involves having impaired dexterity in his right hand with diminished ability to control eating utensils.  The EMG disclosed some chronic changes in the C5, C6 and C7 rootlets, bilateral ulnar neuropathies, mild on the right and significant on the left (despite having a left ulnar nerve transposition 1-1/2 years ago).  It also showed however bilateral carpal tunnel syndrome, moderate on the right and severe on the left.  There is no ongoing evidence of rootlet compromise beyond the chronic changes.    He has also had ongoing problems with recurrent bladder infections treated with courses of antibiotics, most recently completing a course of Cipro for an infection this past August.  There have not been other symptoms of  progressive neurological decline.    He is also known to have obstructive sleep apnea.  This was diagnosed 9 months ago following a polysomnogram as requested by Dr. Sandeep Lopes.  BiPAP has been implemented which has reduced his nocturnal awakenings from 50 or 60/h down to 16 or 17/h.  He has not had follow-up with Dr. Lopes but has had some phone contact with nurse practitioner Suly House in that clinic.  Although he has not met with her recently, there appears to be a request for preapproval of modafinil, presumably with thought to attenuating his excessive daytime sleepiness.  There appears to be an order for the modafinil but Mr. Styles was unaware of its existence and has not taken it.    In addition, Zoloft which she had been on for several years was recently discontinued and substituted for with Wellbutrin by Dr. Chavez has thoughts of Zoloft interference with his nervous system function might have been in play but did not seem to make any difference.  His baclofen infusion through the intrathecal pump has been modified both up and down which does not seem to have made a difference in his spinal cord symptoms of numbness, tingling, or walking capacity/stamina.  There has not seem to have been any impact with the use of the Botox injection.               Physical Exam:   His current clinical examination reveals him to be seated in his motorized wheelchair.  Although he has good power of his deltoids biceps and triceps, distal forearm and hand weakness is quite prominent.  He has bilateral wrist drops, his hands and fingers are curled into a permanent  like state.  I am able to open his fingers fully but he has no capacity to do so independently.  His lower extremities are quite weak.  He has no ability to elevate the legs with the iliopsoas muscles.  His right leg is weaker than the left, both are significantly impaired with spasticity.  He has bilateral AFOs the right side for ankle foot drop in  the left side to protect an injury to his ankle.  He is ability to contract the left hamstring but not the right.  He is generally areflexic throughout.  He has atrophy of the left opponens pollicis but not so on the right although both are quite weak.         Data:   All laboratory data reviewed  All imaging studies reviewed by me             DATA for DOCUMENTATION:         Past Medical History:     Patient Active Problem List   Diagnosis     Special screening for malignant neoplasm of prostate     CARDIOVASCULAR SCREENING; LDL GOAL LESS THAN 160     Basal cell carcinoma of skin of trunk     Basal cell carcinoma of nose     Sessile colonic polyp     Right shoulder pain, unspecified chronicity     Cervical segment dysfunction     Right supraspinatus tendinitis     Thoracic segment dysfunction     Pancreas cyst     Prostate cancer (H)     H/O spinal cord injury     Chronic incomplete spastic tetraplegia (H)     Adjustment disorder with depressed mood     Past Medical History:   Diagnosis Date     Basal cell carcinoma      Malignant melanoma of other specified sites of skin 1980s    no recurrence     Personal history of other malignant neoplasm of skin 4/15/02    recurrent BCC scalp and face     Routine general medical examination at a health care facility 4/12/02     Scrotal varices     L scrotum       Also see scanned health assessment forms.       Past Surgical History:     Past Surgical History:   Procedure Laterality Date     COLONOSCOPY  11/19/2013    Procedure: COMBINED COLONOSCOPY, SINGLE BIOPSY/POLYPECTOMY BY BIOPSY;  COLONOSCOPY;  Surgeon: Adam Lopez MD;  Location:  GI     COLONOSCOPY N/A 9/29/2016    Procedure: COLONOSCOPY;  Surgeon: Paul Ken MD;  Location:  GI     [facility] NONSPECIFIC PROCEDURE  6/27/02    colonoscopy with polypectomy x3, fulguration x1     [facility] NONSPECIFIC PROCEDURE      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     [facility] NONSPECIFIC PROCEDURE      multiple skin Bxs     [facility]  NONSPECIFIC PROCEDURE      tonsillectomy            Social History:     Social History     Socioeconomic History     Marital status:      Spouse name: Not on file     Number of children: 3     Years of education: Not on file     Highest education level: Not on file   Occupational History     Employer: [employer]   Tobacco Use     Smoking status: Never     Smokeless tobacco: Never   Vaping Use     Vaping Use: Never used   Substance and Sexual Activity     Alcohol use: Yes     Alcohol/week: 0.0 standard drinks of alcohol     Comment: 2  drinks per week     Drug use: Not Currently     Sexual activity: Not Currently     Partners: Female   Other Topics Concern     Not on file   Social History Narrative     Not on file     Social Determinants of Health     Financial Resource Strain: Low Risk  (9/21/2021)    Overall Financial Resource Strain (CARDIA)      Difficulty of Paying Living Expenses: Not hard at all   Food Insecurity: No Food Insecurity (9/21/2021)    Hunger Vital Sign      Worried About Running Out of Food in the Last Year: Never true      Ran Out of Food in the Last Year: Never true   Transportation Needs: No Transportation Needs (9/21/2021)    PRAPARE - Transportation      Lack of Transportation (Medical): No      Lack of Transportation (Non-Medical): No   Physical Activity: Not on file   Stress: Not on file   Social Connections: Not on file   Interpersonal Safety: Not on file   Housing Stability: Unknown (9/21/2021)    Housing Stability Vital Sign      Unable to Pay for Housing in the Last Year: No      Number of Places Lived in the Last Year: Not on file      Unstable Housing in the Last Year: No              Family History:     Family History   Problem Relation Age of Onset     Cancer Mother         thyroid and lung     Prostate Cancer Father 65        uncle also had prostate Ca     Cerebrovascular Disease Father      Hypertension Father      Arthritis Daughter      Skin Cancer  Daughter      Breast Cancer Sister      Cancer - colorectal No family hx of      Diabetes No family hx of             Medications:     Current Outpatient Medications   Medication Sig     Acetaminophen 325 MG CAPS Take 325-650 mg by mouth as needed     amoxicillin-clavulanate (AUGMENTIN) 875-125 MG tablet Take 1 tablet by mouth 2 times daily     baclofen (LIORESAL) 10 MG tablet >>>IF PUMP FAILS  Take 10 mg six times a day IF PUMP FAILS<<<     buPROPion (WELLBUTRIN XL) 150 MG 24 hr tablet Take 1 tablet (150 mg) by mouth every morning     docusate sodium (ENEMEEZ) 283 MG enema Place 1 enema rectally daily     Ethyl Alcohol, Skin Cleanser, 62 % LIQD Apply to hands as needed prior to self cath.     saline 0.65 % (Soln) SOLN Spray in nostril as needed     senna (SENOKOT) 8.6 MG tablet Take 4 tablets by mouth daily     sodium chloride (SHANNA 128) 5 % ophthalmic ointment 1 Application as needed for dry eyes     UNABLE TO FIND MEDICATION NAME: Benesys Powder, 3 tsp per day     Vitamin D, Cholecalciferol, 25 MCG (1000 UT) CAPS Take by mouth daily      No current facility-administered medications for this visit.              Review of Systems:   A comprehensive 10 point review of systems (constitutional, ENT, cardiac, peripheral vascular, lymphatic, respiratory, GI, , Musculoskeletal, skin, Neurological) was performed and found to be negative except as described in this note.     See intake form completed by patient     Again, thank you for allowing me to participate in the care of your patient.        Sincerely,        Nas Bolivar MD, MD

## 2023-09-21 NOTE — NURSING NOTE
José Antonio Saha is a 73 year old male who presents for:  Chief Complaint   Patient presents with    Referral     Chronic incomplete spastic tetraplegia.        Initial Vitals:  /66   Pulse 70   SpO2 94%  Estimated body mass index is 23.06 kg/m  as calculated from the following:    Height as of 7/17/23: 1.829 m (6').    Weight as of 7/17/23: 77.1 kg (170 lb).. There is no height or weight on file to calculate BSA. BP completed using cuff size: jerry Becerril

## 2023-09-21 NOTE — PROGRESS NOTES
[facility]    José Antonio Saha MRN# [id number]   Age: 73 year old YOB: 1950     Requesting physician: Kirk Oconnell            Assessment and Plan:   Assessment:   Incomplete tetraplegia due to spinal injury with cervical fracture and cord injury at C5 4 years ago  Recognition of changing symptoms and function of his spinal cord with slowly progressive dysesthesias in his distal lower extremities, progressing up to his neck region over the past several years, followed by worsening anesthesia in the same distribution and course as the dysesthesias.  He in addition recognizes increased difficulty with gait, diminished walking capacity and stamina and tightness in his chest musculature over the past 9 months.  Not found related to further spinal cord damage upon review of cervical and thoracic MR imaging  Worsening right hand dexterity - possibly related to recent findings of CTS  Persistent difficulties with ALL and excessive daytime sleepiness     Plan:   Continued spasticity management with intrathecal baclofen and episodic botox  Consider right sided (or bilateral) CTS release  F/U with pulmonary medicine regarding further management of ALL and current efficacy of his BiPAP.    After going over his chart notes, his imaging studies, EMG reports, his history, and his clinical exam, I would be drawn to the following conclusions:    I do not find neurologic evidence for ongoing or new threats to his spinal cord.  I suspect that he is settling into the residuals of his original spinal cord injury.  I suspect that the sensory and motor difficulties although suggesting progression of his original injury, are representative instead of the trauma 4 years ago.  I suspect that worsening effects of spasticity despite appropriate interventions remain the likely source of his decline.  It appears that a number of modifications have been made in his medications to no avail and that there  is no imaging evidence of new injury.    With regard to his upper extremity function, I do not think I had identify evidence of rootlet origin pathology.  I would wonder if perhaps the carpal tunnel syndromes are adding some impairment as well and it might be worth decompressing without necessarily having expectation of improvement in his function.    I am not certain that there is any utility in reassessing his sleep apnea but he is certainly not clinically restored and well rested despite the use of the BiPAP thus far.  I did recommend that he perhaps seek an updated review from Dr. Lopes or his clinic.    Unfortunately I do not identify any neurological area in which substantial improvement might be forthcoming.             History of Present Illness:   CC:  I met with Mr. Styles and his wife, Steffanie, for an hour today for neurological review of symptoms suggestive of decline of his spinal cord function.  His history is quite extensive and complicated.  In general summary, he sustained a traumatic injury to his cervical spinal cord in June 2019 requiring decompression stabilization performed by Dr. Walter Luevano, neurosurgeon at [facility].  Postoperatively, he was stable and referred on to the [facility] where he has been under the capable care of Dr. Namrata Tate.  He has been an active participant in rehab services and made initial progress in terms of his strength, tone, flexibility, gait, and stamina suggesting some improvement in his spinal cord function.  He has since however been having increasing problems with spasticity treated with an intrathecal baclofen pump as well as 3-month visits for Botox injections.  Unfortunately, he has been developing some symptoms over the past 2 years or perhaps longer, of worsening spinal cord function with recognition of a sense of numbness and tingling which began in his distal lower extremities progressing all the way up to his neck and more  recently followed by a sense of numbness or anesthesia in the same distribution with the same progression.  At the same time, his activity level which permits him to get up out of the his wheelchair and ambulate in a circuit of approximately 120 feet around his house, has found that this capacity has been diminishing to the extent that now it takes him almost 3 times as long to complete 1 loop and after that he is completely exhausted and cannot function any further.  As result, further evaluation of his spinal cord was performed this past August with updated imaging of his cervical and thoracic spine.  His fractures and degenerative changes have been stable as has been his spinal cord injury based on those studies.    Further review was undertaken for complaints of worsening upper extremity function particularly in his right arm by having him go through an EMG of both upper extremities.  The symptom of arm dysfunction involves having impaired dexterity in his right hand with diminished ability to control eating utensils.  The EMG disclosed some chronic changes in the C5, C6 and C7 rootlets, bilateral ulnar neuropathies, mild on the right and significant on the left (despite having a left ulnar nerve transposition 1-1/2 years ago).  It also showed however bilateral carpal tunnel syndrome, moderate on the right and severe on the left.  There is no ongoing evidence of rootlet compromise beyond the chronic changes.    He has also had ongoing problems with recurrent bladder infections treated with courses of antibiotics, most recently completing a course of Cipro for an infection this past August.  There have not been other symptoms of progressive neurological decline.    He is also known to have obstructive sleep apnea.  This was diagnosed 9 months ago following a polysomnogram as requested by Dr. Sandeep Lopes.  BiPAP has been implemented which has reduced his nocturnal awakenings from 50 or 60/h down to 16 or 17/h.   He has not had follow-up with Dr. Lopes but has had some phone contact with nurse practitioner Suly House in that clinic.  Although he has not met with her recently, there appears to be a request for preapproval of modafinil, presumably with thought to attenuating his excessive daytime sleepiness.  There appears to be an order for the modafinil but Mr. Styles was unaware of its existence and has not taken it.    In addition, Zoloft which she had been on for several years was recently discontinued and substituted for with Wellbutrin by Dr. Chavez has thoughts of Zoloft interference with his nervous system function might have been in play but did not seem to make any difference.  His baclofen infusion through the intrathecal pump has been modified both up and down which does not seem to have made a difference in his spinal cord symptoms of numbness, tingling, or walking capacity/stamina.  There has not seem to have been any impact with the use of the Botox injection.               Physical Exam:   His current clinical examination reveals him to be seated in his motorized wheelchair.  Although he has good power of his deltoids biceps and triceps, distal forearm and hand weakness is quite prominent.  He has bilateral wrist drops, his hands and fingers are curled into a permanent  like state.  I am able to open his fingers fully but he has no capacity to do so independently.  His lower extremities are quite weak.  He has no ability to elevate the legs with the iliopsoas muscles.  His right leg is weaker than the left, both are significantly impaired with spasticity.  He has bilateral AFOs the right side for ankle foot drop in the left side to protect an injury to his ankle.  He is ability to contract the left hamstring but not the right.  He is generally areflexic throughout.  He has atrophy of the left opponens pollicis but not so on the right although both are quite weak.         Data:   All laboratory data  reviewed  All imaging studies reviewed by me             DATA for DOCUMENTATION:         Past Medical History:     Patient Active Problem List   Diagnosis    Special screening for malignant neoplasm of prostate    CARDIOVASCULAR SCREENING; LDL GOAL LESS THAN 160    Basal cell carcinoma of skin of trunk    Basal cell carcinoma of nose    Sessile colonic polyp    Right shoulder pain, unspecified chronicity    Cervical segment dysfunction    Right supraspinatus tendinitis    Thoracic segment dysfunction    Pancreas cyst    Prostate cancer (H)    H/O spinal cord injury    Chronic incomplete spastic tetraplegia (H)    Adjustment disorder with depressed mood     Past Medical History:   Diagnosis Date    Basal cell carcinoma     Malignant melanoma of other specified sites of skin 1980s    no recurrence    Personal history of other malignant neoplasm of skin 4/15/02    recurrent BCC scalp and face    Routine general medical examination at a health care facility 4/12/02    Scrotal varices     L scrotum       Also see scanned health assessment forms.       Past Surgical History:     Past Surgical History:   Procedure Laterality Date    COLONOSCOPY  11/19/2013    Procedure: COMBINED COLONOSCOPY, SINGLE BIOPSY/POLYPECTOMY BY BIOPSY;  COLONOSCOPY;  Surgeon: Adam Lopez MD;  Location:  GI    COLONOSCOPY N/A 9/29/2016    Procedure: COLONOSCOPY;  Surgeon: Paul Ken MD;  Location:  GI    [facility] NONSPECIFIC PROCEDURE  6/27/02    colonoscopy with polypectomy x3, fulguration x1    [facility] NONSPECIFIC PROCEDURE      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    Z NONSPECIFIC PROCEDURE      multiple skin Bxs    [facility] NONSPECIFIC PROCEDURE      tonsillectomy            Social History:     Social History     Socioeconomic History    Marital status:      Spouse name: Not on file    Number of children: 3    Years of education: Not on file    Highest education level: Not on file   Occupational History     Employer: [employer]   Tobacco Use    Smoking status: Never    Smokeless tobacco: Never   Vaping Use    Vaping Use: Never used   Substance and Sexual Activity    Alcohol use: Yes     Alcohol/week: 0.0 standard drinks of alcohol     Comment: 2  drinks per week    Drug use: Not Currently    Sexual activity: Not Currently     Partners: Female   Other Topics Concern    Not on file   Social History Narrative    Not on file     Social Determinants of Health     Financial Resource Strain: Low Risk  (9/21/2021)    Overall Financial Resource Strain (CARDIA)     Difficulty of Paying Living Expenses: Not hard at all   Food Insecurity: No Food Insecurity (9/21/2021)    Hunger Vital Sign     Worried About Running Out of Food in the Last Year: Never true     Ran Out of Food in the Last Year: Never true   Transportation Needs: No Transportation Needs (9/21/2021)    PRAPARE - Transportation     Lack of Transportation (Medical): No     Lack of Transportation (Non-Medical): No   Physical Activity: Not on file   Stress: Not on file   Social Connections: Not on file   Interpersonal Safety: Not on file   Housing Stability: Unknown (9/21/2021)    Housing Stability Vital Sign     Unable to Pay for Housing in the Last Year: No     Number of Places Lived in the Last Year: Not on file     Unstable Housing in the Last Year: No              Family History:     Family History   Problem Relation Age of Onset    Cancer Mother         thyroid and lung    Prostate Cancer Father 65        uncle also had prostate Ca    Cerebrovascular Disease Father     Hypertension Father     Arthritis Daughter     Skin Cancer Daughter     Breast Cancer Sister     Cancer - colorectal No family hx of     Diabetes No family hx of             Medications:     Current Outpatient Medications   Medication Sig    Acetaminophen 325 MG CAPS Take 325-650 mg by mouth as needed    amoxicillin-clavulanate (AUGMENTIN) 875-125 MG tablet Take 1 tablet by mouth 2 times daily     baclofen (LIORESAL) 10 MG tablet >>>IF PUMP FAILS  Take 10 mg six times a day IF PUMP FAILS<<<    buPROPion (WELLBUTRIN XL) 150 MG 24 hr tablet Take 1 tablet (150 mg) by mouth every morning    docusate sodium (ENEMEEZ) 283 MG enema Place 1 enema rectally daily    Ethyl Alcohol, Skin Cleanser, 62 % LIQD Apply to hands as needed prior to self cath.    saline 0.65 % (Soln) SOLN Spray in nostril as needed    senna (SENOKOT) 8.6 MG tablet Take 4 tablets by mouth daily    sodium chloride (SHANNA 128) 5 % ophthalmic ointment 1 Application as needed for dry eyes    UNABLE TO FIND MEDICATION NAME: Benesys Powder, 3 tsp per day    Vitamin D, Cholecalciferol, 25 MCG (1000 UT) CAPS Take by mouth daily      No current facility-administered medications for this visit.              Review of Systems:   A comprehensive 10 point review of systems (constitutional, ENT, cardiac, peripheral vascular, lymphatic, respiratory, GI, , Musculoskeletal, skin, Neurological) was performed and found to be negative except as described in this note.     See intake form completed by patient

## 2023-09-26 ENCOUNTER — LAB (OUTPATIENT)
Dept: LAB | Facility: CLINIC | Age: 73
End: 2023-09-26
Payer: MEDICARE

## 2023-09-26 DIAGNOSIS — Z12.11 COLON CANCER SCREENING: ICD-10-CM

## 2023-09-27 ENCOUNTER — APPOINTMENT (OUTPATIENT)
Dept: GENERAL RADIOLOGY | Facility: CLINIC | Age: 73
End: 2023-09-27
Attending: EMERGENCY MEDICINE
Payer: MEDICARE

## 2023-09-27 ENCOUNTER — HOSPITAL ENCOUNTER (EMERGENCY)
Facility: CLINIC | Age: 73
Discharge: HOME | End: 2023-09-27
Attending: PHYSICIAN ASSISTANT | Admitting: PHYSICIAN ASSISTANT
Payer: MEDICARE

## 2023-09-27 VITALS
DIASTOLIC BLOOD PRESSURE: 77 MMHG | TEMPERATURE: 98 F | RESPIRATION RATE: 16 BRPM | SYSTOLIC BLOOD PRESSURE: 141 MMHG | HEART RATE: 58 BPM | OXYGEN SATURATION: 97 %

## 2023-09-27 DIAGNOSIS — K59.00 CONSTIPATION: ICD-10-CM

## 2023-09-27 PROCEDURE — 74018 RADEX ABDOMEN 1 VIEW: CPT

## 2023-09-27 PROCEDURE — 250N000013 HC RX MED GY IP 250 OP 250 PS 637: Performed by: PHYSICIAN ASSISTANT

## 2023-09-27 PROCEDURE — 99283 EMERGENCY DEPT VISIT LOW MDM: CPT

## 2023-09-27 RX ADMIN — MINERAL OIL 226 ML: 1 OIL ORAL at 20:03

## 2023-09-27 ASSESSMENT — ACTIVITIES OF DAILY LIVING (ADL)
ADLS_ACUITY_SCORE: 35

## 2023-09-27 NOTE — ED NOTES
PIT/Triage Evaluation    Patient presented with the patient.  States he has not had a bowel movement last week.  He has prior history of a spinal injury.  He states that he has tried enemas, fiber, stool softeners, kiwi, probiotics without any success.  Wife states that she tried to do digital stimulation however patient was not able to have bowel movement.  He reports no prior abdominal surgeries.  No abdominal pain.  States has been tolerating p.o.    Exam is notable for:  General:  Alert, interactive  Cardiovascular:  Well perfused  Lungs:  No respiratory distress, no accessory muscle use  Bowel sounds are present.  Abdomen is soft nontender while patient is in wheelchair.  Exam is limited  Neuro:  Moving all 4 extremities  Skin:  Warm, dry  Psych:  Normal affect      Appropriate interventions for symptom management were initiated if applicable.  Appropriate diagnostic tests were initiated if indicated.    Important information for subsequent clinician:  Discussed plan for x-ray at this time.    I briefly evaluated the patient and developed an initial plan of care. I discussed this plan and explained that this brief interaction does not constitute a full evaluation. Patient/family understands that they should wait to be fully evaluated and discuss any test results with another clinician prior to leaving the hospital.       Marely Liu DO  09/27/23 [id number]

## 2023-09-27 NOTE — ED TRIAGE NOTES
Patient comes in with constipation for the last week. Patient does regular bowel prep. Fiber, fleet enema, digital stim, senna. States gets right ear from this. Hx SCI C5-C6. States is passing gas. Wife states can feel stool ball, can get out small bits.      Triage Assessment       Row Name 09/27/23 7983       Triage Assessment (Adult)    Airway WDL WDL       Respiratory WDL    Respiratory WDL WDL       Skin Circulation/Temperature WDL    Skin Circulation/Temperature WDL WDL       Cardiac WDL    Cardiac WDL WDL       Peripheral/Neurovascular WDL    Peripheral Neurovascular WDL WDL       Cognitive/Neuro/Behavioral WDL    Cognitive/Neuro/Behavioral WDL WDL

## 2023-09-27 NOTE — ED PROVIDER NOTES
History     Chief Complaint:  Constipation       The history is provided by the patient.      José Antonio Saha is a 73 year old male wheelchair bound with hx past spinal cord injury who presents with constipation. The patient reports that he has not had a bowel movement for 1 week. He is not aware of any known triggers. He complains of bowel urgency and cramping when he feels like he needs of a bowel movement but no other pain.  He has a history of chronic constipation and he explains that he is on a bowel program. The patient's wife reports that she has performed a rectal disimpaction and digital stimulation. She explains that she was able to feel stool beyond what she could reach. He denies experiencing fever, vomiting, or urinary problems.  No urinary incontinence or retention and no new weakness or numbness or back pain.  Still has sensation of the abdomen and groin.  He also denies any recent medications changes. He reports a history of UTIs.    Independent Historian:   History provided by the patient and the patient's wife as noted above.    Review of External Notes:   none     Medications:   Baclofen  Bupropion  Docusate  Intrathecal pain pump  Nitroglycerin  Omeprazole  Senna  Sertraline    Past Medical History:   Adjustment disorder with mixed anxiety and depressed mood  Autonomic dysreflexia  Chronic incomplete spastic tetraplegia  Chronic pain of both ankles  Episode of transient neurologic symptoms  Excessive daytime sleepiness  Impaired functional mobility, balance, gait, and endurance  Localized edema  Malignant melanoma of skin  Neurogenic bladder  Neurogenic bowel  Neurogenic orthostatic hypotension  Normocytic anemia  Obstructive sleep apnea with bilevel positive airway pressure  Pancreas cyst  Presence of intrathecal baclofen pump  Prostate cancer  Scrotal varices  Spasm of muscle, intractable  T7 vertebral fracture  Thoracic segment dysfunction  Toe infection  Ulnar neuropathy of both upper  extremities    Past Surgical History:   Biopsy, skin, multiple  Colonoscopy x2  Excision of melanoma, shoulder  Tonsillectomy    Physical Exam   Patient Vitals for the past 24 hrs:   BP Temp Temp src Pulse Resp SpO2   09/27/23 2029 (!) 141/77 -- -- 58 16 97 %   09/27/23 1604 125/77 98  F (36.7  C) Temporal 62 16 96 %        Physical Exam  General: Awake, alert, non-toxic.  Head:  Scalp is NC/AT  Eyes:  Conjunctiva normal, PERRL  ENT:  The external nose and ears are normal.   Neck:  Normal range of motion without rigidity.  CV:  Regular rate and rhythm    No pathologic murmur, rubs, or gallops.  Resp:  Breath sounds are clear bilaterally    Non-labored, no retractions or accessory muscle use  Abdomen: Abdomen is soft, no distension, no tenderness, no masses.   MS:  No midline cervical, thoracic, or lumbar tenderness.    Skin:  Warm and dry, No rash or lesions noted.  Neuro:  Alert and oriented.  GCS 15 Wheelchair bound.  Moves arms normally.  Weakness to BL legs, baseline.  No facial asymmetry.   Psych: Awake. Alert. Normal affect. Appropriate interactions.      Emergency Department Course     Imaging:  Abdomen XR 1 vw   Final Result   IMPRESSION: Nonobstructive bowel gas pattern. Mild-to-moderate diffuse colonic stool burden.       Visualized lung bases are clear.      Spinal catheter device overlies the right hemiabdomen.         Report per radiology    Laboratory:  None     Emergency Department Course & Assessments:    Interventions:  Medications   Enema Compound (docusate/mineral oil/NaPhos) NO MAG CIT PREMIX (226 mLs Rectal $Given 9/27/23 2003)      Independent Interpretation (X-rays, CTs, rhythm strip):  I independently reviewed the patient's abdominal x-ray note no free air or dilated loops of bowel.    Assessments, Consultations, & Discussion of Management & Tests:  ED Course as of 09/27/23 2258   Wed Sep 27, 2023   1838 I obtained history and examined the patient.   2117 I reassessed the patient. He feels  much better. He had a large bowel movement and would like to go home.     Social Determinants of Health affecting care:   Transportation    Disposition:  The patient was discharged to home.     Impression & Plan      Medical Decision Makin-year-old male with history of spinal cord injury chronic constipation wheelchair-bound who presents for evaluation of constipation.  Broad differential considered.  X-ray shows no evidence of bowel obstruction free air shows moderate stool burden.  He has no abdominal pain or tenderness vomiting or fever to suggest diverticulitis bowel obstruction perforation mass appendicitis etc. and I think these are less likely given complete relief of symptoms with enema and large bowel movement.  No new neurologic symptoms or urinary symptoms to suggest acute neurologic injury cord compression, cauda equina.  Patient otherwise well-appearing felt very well after enema and large BM want to go home.  Discharged home with wife in good condition.    Diagnosis:    ICD-10-CM    1. Constipation  K59.00            Discharge Medications:  Discharge Medication List as of 2023  9:21 PM           Scribe Disclosure:  I, Gregor James, am serving as a scribe at 6:42 PM on 2023 to document services personally performed by Robert Bates PA-C based on my observations and the provider's statements to me.        Robert Bates PA-C  23 [phone]

## 2023-09-28 ENCOUNTER — TRANSFERRED RECORDS (OUTPATIENT)
Dept: HEALTH INFORMATION MANAGEMENT | Facility: CLINIC | Age: 73
End: 2023-09-28
Payer: MEDICARE

## 2023-09-29 ENCOUNTER — PATIENT OUTREACH (OUTPATIENT)
Dept: FAMILY MEDICINE | Facility: CLINIC | Age: 73
End: 2023-09-29
Payer: MEDICARE

## 2023-09-29 NOTE — TELEPHONE ENCOUNTER
"ED/Discharge Protocol    \"Hi, my name is Shantel Foster RN, a registered nurse, and I am calling on behalf of Dr. Chavez's office at [facility].  I am calling to follow up and see how things are going for you after your recent visit.\"    \"I see that you were in the (ER/UC/IP) on 09/27/23.    How are you doing now that you are home?\" \"I'm doing good.\"    Is patient experiencing symptoms that may require a hospital visit?  No    Discharge Instructions    \"Let's review your discharge instructions.  What is/are the follow-up recommendations?  Pt. Response: Follow up with PCP with results.     \"Were you instructed to make a follow-up appointment?\"  Pt. Response: No.       \"When you see the provider, I would recommend that you bring your discharge instructions with you.    Medications    \"How many new medications are you on since your hospitalization/ED visit?\"    0-1  \"How many of your current medicines changed (dose, timing, name, etc.) while you were in the hospital/ED visit?\"   0-1  \"Do you have questions about your medications?\"   No  \"Were you newly diagnosed with heart failure, COPD, diabetes or did you have a heart attack?\"   No  For patients on insulin: \"Did you start on insulin in the hospital or did you have your insulin dose changed?\"   No  Post Discharge Medication Reconciliation Status: discharge medications reconciled, continue medications without change.    Was MTM referral placed (*Make sure to put transitions as reason for referral)?   No    Call Summary    \"Do you have any questions or concerns about your condition or care plan at the moment?\"    No  Triage nurse advice given: Call us with questions/concerns. Patient verbalized understanding.     Patient was in ER 1 in the past year (assess appropriateness of ER visits.)      \"If you have questions or things don't continue to improve, we encourage you contact us through the main clinic number,  [phone].  Even if the clinic is not open, triage " "nurses are available 24/7 to help you.     We would like you to know that our clinic has extended hours (provide information).  We also have urgent care (provide details on closest location and hours/contact info)\"      \"Thank you for your time and take care!\"     "

## 2023-09-29 NOTE — TELEPHONE ENCOUNTER
What type of discharge? Emergency Department  Risk of Hospital admission or ED visit: 52.4%  Is a TCM episode required? No  When should the patient follow up with PCP? N/A    Shantel Foster RN  [facility]

## 2023-10-03 PROCEDURE — 82274 ASSAY TEST FOR BLOOD FECAL: CPT

## 2023-10-05 LAB — HEMOCCULT STL QL IA: NEGATIVE

## 2023-10-06 NOTE — RESULT ENCOUNTER NOTE
The following letter pertains to your most recent diagnostic tests:    Good news! Good news! Your stool test for colon cancer screening is negative.  This should be repeated in one year.        Sincerely,    Dr. Chavez

## 2023-10-17 DIAGNOSIS — R53.83 FATIGUE: ICD-10-CM

## 2023-10-17 DIAGNOSIS — G47.33 OBSTRUCTIVE SLEEP APNEA TREATED WITH BILEVEL POSITIVE AIRWAY PRESSURE (BIPAP): Primary | ICD-10-CM

## 2023-10-18 NOTE — PROGRESS NOTES
HPI      SUBJECTIVE:   José Antonio Saha is a 68 year old male who presents to clinic today for the following health issues:      RESPIRATORY SYMPTOMS      Duration: 2 weeks    Description  nasal congestion, rhinorrhea, sore throat slight , facial pain/pressure, rarely productive cough, chills, minor ear pain right, fatigue/malaise, hoarse voice and myalgias    Severity: moderate    Accompanying signs and symptoms: None    History (predisposing factors):  Grandkids were sick     Precipitating or alleviating factors: None    Therapies tried and outcome:  oral decongestant acetaminophen    Started with sore throat and congestion   The first week didn't feel terrible   Now has gotten a little worse and is quite tired   Has been training for a cross country [employer] race that is this weekend and was hoping to participate       Past Medical History:   Diagnosis Date     Basal cell carcinoma      Malignant melanoma of other specified sites of skin 1980s    no recurrence     Personal history of other malignant neoplasm of skin 4/15/02    recurrent BCC scalp and face     Routine general medical examination at a health care facility 4/12/02     Scrotal varices     L scrotum     Past Surgical History:   Procedure Laterality Date     C NONSPECIFIC PROCEDURE  6/27/02    colonoscopy with polypectomy x3, fulguration x1     C NONSPECIFIC PROCEDURE      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     C NONSPECIFIC PROCEDURE      multiple skin Bxs     C NONSPECIFIC PROCEDURE      tonsillectomy     COLONOSCOPY  11/19/2013    Procedure: COMBINED COLONOSCOPY, SINGLE BIOPSY/POLYPECTOMY BY BIOPSY;  COLONOSCOPY;  Surgeon: Adam Lopez MD;  Location:  GI     COLONOSCOPY N/A 9/29/2016    Procedure: COLONOSCOPY;  Surgeon: Paul Ken MD;  Location:  GI     Social History   Substance Use Topics     Smoking status: Never Smoker     Smokeless tobacco: Never Used     Alcohol use 0.0 oz/week     0 Standard drinks or equivalent per week       Physical Therapy Visit    Visit Type: Daily Treatment Note  Visit: 5  Referring Provider: Kaylan Ellsworth MD  Medical Diagnosis (from order): Diagnosis Information    Diagnosis  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 Able to tolerate supine with diaphragm breathing with third dilator.       OBJECTIVE                                                                                                                     Strength  (out of 5 unless noted, standard test position unless noted)   Hip:    - Extension:        • Left: 4        • Right: 4+    - Abduction:        • Left: 4+        • Right: 4+    - External Rotation:        • Left: 4-        • Right: 4                       Treatment     Therapeutic Exercise  Patient education - use of dilator in managing symptoms; use 15-20 minutes at a time    Diaphragm breathing with coordinated reverse kegal \"flower blossoming for visual\" x 2\"   REPEX 50 repetitions extension 15 degrees, flexion 3 degrees; speed 3.5   Bridge with adduction squeeze and coordinated kegal/reverse kegal 2 x 10  Side stepping green band x 3 laps  - not done today  Squat with coordinated breathing and contraction of pelvic floor x 12 repetitions     Not Done:  Adductor stretch/\"butterfly\"  Child pose rocking with coordinated diaphragm breathing   Happy baby   repeated extension in lying 2 x 10   Clams x 10 5\" H   Patient education - perineal and vulvar massage by self as well as Trigger point release   Deep squatting with diaphragm breathing with upper extremity assist  Side hip abduction 2.5 lbs x 20 bilateral   Diaphragm breathing x 2\"     Home Exercise Program   [id number]      ASSESSMENT                                                                                                            patient  "Comment: 2  drinks per week     Current Outpatient Prescriptions   Medication Sig Dispense Refill     NO ACTIVE MEDICATIONS        Allergies   Allergen Reactions     No Known Drug Allergies        Reviewed and updated as needed this visit by clinical staff and provider      ROS  Detailed as above       /80 (Cuff Size: Adult Regular)  Pulse 69  Temp 98.5  F (36.9  C) (Oral)  Ht 5' 11\" (1.803 m)  Wt 169 lb (76.7 kg)  SpO2 98%  BMI 23.57 kg/m2      Physical Exam   Constitutional: He is well-developed, well-nourished, and in no distress.   HENT:   Head: Normocephalic.   Right Ear: Tympanic membrane, external ear and ear canal normal.   Left Ear: Tympanic membrane, external ear and ear canal normal.   Nose: Rhinorrhea present.   Mouth/Throat: Oropharynx is clear and moist. No oropharyngeal exudate.   Posterior cobblestoning   Eyes: Conjunctivae are normal.   Neck: Normal range of motion.   Cardiovascular: Normal rate, regular rhythm and normal heart sounds.    No murmur heard.  Pulmonary/Chest: Effort normal and breath sounds normal. No respiratory distress.   Lymphadenopathy:     He has no cervical adenopathy.   Neurological: He is alert.   Skin: Skin is warm and dry.   Psychiatric: Mood and affect normal.   Vitals reviewed.      Assessment and Plan:       ICD-10-CM    1. Viral URI with cough J06.9     B97.89        Looks great today on exam other than mild rhinorrhea   Suspect viral etiology today   We discussed Symptomatic treatments: saline nasal spray, antiinflammatory, decongestant, mucinex  Call if worsening or not improving       DEE Unger, CNP  [facility]    " returns to therapy with report of improvement in symptoms with use of dilators and coordinated breathing at home. patient has now progressed to third dilator in pack with mild symptoms. Progressed to 15 degrees extension passive range of motion on REPEX with no adverse reaction and good tolerance. Hip strength measures show improvement - see above.  Continued with above interventions as patient is progressing well and showing excellent rehab potential with continued care.        Therapy procedure time and total treatment time can be found documented on the Time Entry flowsheet

## 2023-10-31 ENCOUNTER — HOSPITAL ENCOUNTER (OUTPATIENT)
Dept: CARDIOLOGY | Facility: CLINIC | Age: 73
Discharge: HOME | End: 2023-10-31
Attending: PHYSICAL MEDICINE & REHABILITATION | Admitting: PHYSICAL MEDICINE & REHABILITATION
Payer: MEDICARE

## 2023-10-31 DIAGNOSIS — G47.33 OBSTRUCTIVE SLEEP APNEA TREATED WITH BILEVEL POSITIVE AIRWAY PRESSURE (BIPAP): ICD-10-CM

## 2023-10-31 DIAGNOSIS — R53.83 FATIGUE: ICD-10-CM

## 2023-10-31 LAB — LVEF ECHO: NORMAL

## 2023-10-31 PROCEDURE — 93306 TTE W/DOPPLER COMPLETE: CPT

## 2023-10-31 PROCEDURE — 93306 TTE W/DOPPLER COMPLETE: CPT | Mod: 26 | Performed by: INTERNAL MEDICINE

## 2024-02-26 ENCOUNTER — OFFICE VISIT (OUTPATIENT)
Dept: FAMILY MEDICINE | Facility: CLINIC | Age: 74
End: 2024-02-26
Payer: MEDICARE

## 2024-02-26 VITALS
RESPIRATION RATE: 14 BRPM | TEMPERATURE: 97.9 F | OXYGEN SATURATION: 95 % | DIASTOLIC BLOOD PRESSURE: 77 MMHG | SYSTOLIC BLOOD PRESSURE: 118 MMHG | HEART RATE: 78 BPM

## 2024-02-26 DIAGNOSIS — N31.9 NEUROGENIC BLADDER: ICD-10-CM

## 2024-02-26 DIAGNOSIS — Z01.818 PREOP GENERAL PHYSICAL EXAM: Primary | ICD-10-CM

## 2024-02-26 PROCEDURE — 99214 OFFICE O/P EST MOD 30 MIN: CPT | Performed by: NURSE PRACTITIONER

## 2024-02-26 RX ORDER — POLYETHYLENE GLYCOL 3350 17 G/17G
1 POWDER, FOR SOLUTION ORAL DAILY
COMMUNITY

## 2024-02-26 RX ORDER — NALTREXONE HCL 1.5 MG
4.5 CAPSULE ORAL DAILY
COMMUNITY
Start: 2024-01-30

## 2024-02-26 RX ORDER — NITROGLYCERIN 20 MG/G
OINTMENT TOPICAL PRN
COMMUNITY
Start: 2023-05-09

## 2024-02-26 RX ORDER — RESPIRATORY SYNCYTIAL VIRUS VACCINE 120MCG/0.5
0.5 KIT INTRAMUSCULAR ONCE
Qty: 1 EACH | Refills: 0 | Status: CANCELLED | OUTPATIENT
Start: 2024-02-26 | End: 2024-02-26

## 2024-02-26 RX ORDER — LINACLOTIDE 72 UG/1
72 CAPSULE, GELATIN COATED ORAL
COMMUNITY

## 2024-02-26 RX ORDER — MODAFINIL 200 MG/1
200 TABLET ORAL PRN
COMMUNITY
End: 2024-07-22

## 2024-02-26 RX ORDER — CEPHALEXIN 500 MG/1
500 CAPSULE ORAL
COMMUNITY
Start: 2024-02-21 | End: 2024-03-02

## 2024-02-26 ASSESSMENT — PAIN SCALES - GENERAL: PAINLEVEL: NO PAIN (0)

## 2024-02-26 NOTE — PROGRESS NOTES
Preoperative Evaluation  [address], [address]  [facility] [address]  Phone: [phone]  Primary Provider: Kirk Chavez  Pre-op Performing Provider: KEREN RODRIGEZ  Feb 26, 2024       Tawanda is a 74 year old, presenting for the following:  Pre Op Exam      Surgical Information  Surgery/Procedure: Cystoscopy botox injections   Surgery Location: [address] [facility]  Surgeon: Hui Connors MBBS   Surgery Date: 2/29/24  Time of Surgery: 9:40AM  Where patient plans to recover: At home with family  Fax number for surgical facility: [phone]    Assessment & Plan     The proposed surgical procedure is considered LOW risk.    (Z01.818) Preop general physical exam  (primary encounter diagnosis)  Comment: Okay for procedure.  No concerns today.  Of note he is on low-dose naltrexone.  I instructed him to stop.  He will take just 1.5 mg tomorrow and then hold until procedure  Plan:     (N31.9) Neurogenic bladder  Comment:   Plan:       Possible Sleep Apnea: uses Bipap         - No identified additional risk factors other than previously addressed    Antiplatelet or Anticoagulation Medication Instructions   - Patient is on no antiplatelet or anticoagulation medications.    Additional Medication Instructions  Patient is to take all scheduled medications on the day of surgery EXCEPT for modifications listed below:  Take just 1.5 mg of naltrexone tomorrow and then hold until surgery.  Okay to restart the following day at his normal dose    Recommendation  APPROVAL GIVEN to proceed with proposed procedure, without further diagnostic evaluation.          Subjective       HPI related to upcoming procedure:     Is currently being treated for a UTI. Will be just finishing abx.   Feeling well overall   Is on low dose naltrexone           2/26/2024     3:38 PM   Preop Questions   1. Have you ever had a heart attack or stroke? No   2. Have you ever had surgery  on your heart or blood vessels, such as a stent placement, a coronary artery bypass, or surgery on an artery in your head, neck, heart, or legs? No   3. Do you have chest pain with activity? No   4. Do you have a history of  heart failure? No   5. Do you currently have a cold, bronchitis or symptoms of other infection? No   6. Do you have a cough, shortness of breath, or wheezing? No   7. Do you or anyone in your family have previous history of blood clots? No   8. Do you or does anyone in your family have a serious bleeding problem such as prolonged bleeding following surgeries or cuts? No   9. Have you ever had problems with anemia or been told to take iron pills? No   10. Have you had any abnormal blood loss such as black, tarry or bloody stools? No   11. Have you ever had a blood transfusion? No   12. Are you willing to have a blood transfusion if it is medically needed before, during, or after your surgery? Yes   13. Have you or any of your relatives ever had problems with anesthesia? No   14. Do you have sleep apnea, excessive snoring or daytime drowsiness? YES - BiPap   14a. Do you have a CPAP machine? Yes   15. Do you have any artifical heart valves or other implanted medical devices like a pacemaker, defibrillator, or continuous glucose monitor? No   16. Do you have artificial joints? No   17. Are you allergic to latex? No       Health Care Directive  Patient does not have a Health Care Directive or Living Will:     Preoperative Review of    reviewed - no record of controlled substances prescribed.      Status of Chronic Conditions:  See problem list for active medical problems.  Problems all longstanding and stable, except as noted/documented.  See ROS for pertinent symptoms related to these conditions.    Patient Active Problem List    Diagnosis Date Noted    Adjustment disorder with depressed mood 06/20/2022     Priority: Medium    Chronic incomplete spastic tetraplegia (H) 01/06/2022     Priority:  Medium    Pancreas cyst 09/20/2021     Priority: Medium    Prostate cancer (H) 09/20/2021     Priority: Medium    H/O spinal cord injury 09/20/2021     Priority: Medium    Right shoulder pain, unspecified chronicity 05/07/2018     Priority: Medium    Cervical segment dysfunction 05/07/2018     Priority: Medium    Right supraspinatus tendinitis 05/07/2018     Priority: Medium    Thoracic segment dysfunction 05/07/2018     Priority: Medium    Sessile colonic polyp 07/29/2016     Priority: Medium    Basal cell carcinoma of skin of trunk 11/21/2013     Priority: Medium    Basal cell carcinoma of nose 11/21/2013     Priority: Medium     s/p Mohs      CARDIOVASCULAR SCREENING; LDL GOAL LESS THAN 160 09/07/2011     Priority: Medium    Special screening for malignant neoplasm of prostate 09/15/2003     Priority: Medium      Past Medical History:   Diagnosis Date    Basal cell carcinoma     Malignant melanoma of other specified sites of skin 1980s    no recurrence    Personal history of other malignant neoplasm of skin 4/15/02    recurrent BCC scalp and face    Routine general medical examination at a health care facility 4/12/02    Scrotal varices     L scrotum     Past Surgical History:   Procedure Laterality Date    COLONOSCOPY  11/19/2013    Procedure: COMBINED COLONOSCOPY, SINGLE BIOPSY/POLYPECTOMY BY BIOPSY;  COLONOSCOPY;  Surgeon: Adam Lopez MD;  Location:  GI    COLONOSCOPY N/A 9/29/2016    Procedure: COLONOSCOPY;  Surgeon: Paul Ken MD;  Location:  GI    [facility] NONSPECIFIC PROCEDURE  6/27/02    colonoscopy with polypectomy x3, fulguration x1    [facility] NONSPECIFIC PROCEDURE      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    [facility] NONSPECIFIC PROCEDURE      multiple skin Bxs    [facility] NONSPECIFIC PROCEDURE      tonsillectomy     Current Outpatient Medications   Medication Sig Dispense Refill    Acetaminophen 325 MG CAPS Take 325-650 mg by mouth as needed      amoxicillin-clavulanate (AUGMENTIN) 875-125 MG tablet  Take 1 tablet by mouth 2 times daily 14 tablet 0    baclofen (LIORESAL) 10 MG tablet >>>IF PUMP FAILS  Take 10 mg six times a day IF PUMP FAILS<<<      buPROPion (WELLBUTRIN XL) 150 MG 24 hr tablet Take 1 tablet (150 mg) by mouth every morning 90 tablet 3    docusate sodium (ENEMEEZ) 283 MG enema Place 1 enema rectally daily 90 enema 3    Ethyl Alcohol, Skin Cleanser, 62 % LIQD Apply to hands as needed prior to self cath. 473 mL 1    saline 0.65 % (Soln) SOLN Spray in nostril as needed      senna (SENOKOT) 8.6 MG tablet Take 4 tablets by mouth daily      sodium chloride (SHANNA 128) 5 % ophthalmic ointment 1 Application as needed for dry eyes      UNABLE TO FIND MEDICATION NAME: Benesys Powder, 3 tsp per day      Vitamin D, Cholecalciferol, 25 MCG (1000 UT) CAPS Take by mouth daily          Allergies   Allergen Reactions    No Known Drug Allergy         Social History     Tobacco Use    Smoking status: Never    Smokeless tobacco: Never   Substance Use Topics    Alcohol use: Yes     Alcohol/week: 0.0 standard drinks of alcohol     Comment: 2  drinks per week     Family History   Problem Relation Age of Onset    Cancer Mother         thyroid and lung    Prostate Cancer Father 65        uncle also had prostate Ca    Cerebrovascular Disease Father     Hypertension Father     Arthritis Daughter     Skin Cancer Daughter     Breast Cancer Sister     Cancer - colorectal No family hx of     Diabetes No family hx of      History   Drug Use Unknown         Review of Systems    Review of Systems  Constitutional, neuro, ENT, endocrine, pulmonary, cardiac, gastrointestinal, genitourinary, musculoskeletal, integument and psychiatric systems are negative, except as otherwise noted.    Objective    /77 (BP Location: Left arm, Patient Position: Sitting, Cuff Size: Adult Regular)   Pulse 78   Temp 97.9  F (36.6  C) (Temporal)   Resp 14   SpO2 95%    Estimated body mass index is 23.06 kg/m  as calculated from the following:     Height as of 7/17/23: 1.829 m (6').    Weight as of 7/17/23: 77.1 kg (170 lb).  Physical Exam  GENERAL: alert and no distress  EYES: Eyes grossly normal to inspection, PERRL and conjunctivae and sclerae normal  RESP: lungs clear to auscultation - no rales, rhonchi or wheezes  CV: regular rate and rhythm, normal S1 S2, no S3 or S4, no murmur, click or rub, no peripheral edema  MS: electronic wheelchair. Limited ROM   SKIN: no suspicious lesions or rashes  NEURO: Normal strength and tone, mentation intact and speech normal  PSYCH: mentation appears normal, affect normal/bright    Recent Labs   Lab Test 05/05/22  1259   HGB 14.9         POTASSIUM 4.2   CR 0.61*        Diagnostics  No labs were ordered during this visit.   No EKG required for low risk surgery (cataract, skin procedure, breast biopsy, etc).  No EKG required, no history of coronary heart disease, significant arrhythmia, peripheral arterial disease or other structural heart disease.    Revised Cardiac Risk Index (RCRI)  The patient has the following serious cardiovascular risks for perioperative complications:   - No serious cardiac risks = 0 points     RCRI Interpretation: 0 points: Class I (very low risk - 0.4% complication rate)         Signed Electronically by: DEE Borges CNP  Copy of this evaluation report is provided to requesting physician.    32 minutes spent on the date of the encounter doing chart review, history and exam, documentation and further activities as noted above

## 2024-02-26 NOTE — PATIENT INSTRUCTIONS
Preparing for Your Surgery  Getting started  A nurse will call you to review your health history and instructions. They will give you an arrival time based on your scheduled surgery time. Please be ready to share:  Your doctor's clinic name and phone number  Your medical, surgical, and anesthesia history  A list of allergies and sensitivities  A list of medicines, including herbal treatments and over-the-counter drugs  Whether the patient has a legal guardian (ask how to send us the papers in advance)  Please tell us if you're pregnant--or if there's any chance you might be pregnant. Some surgeries may injure a fetus (unborn baby), so they require a pregnancy test. Surgeries that are safe for a fetus don't always need a test, and you can choose whether to have one.   If you have a child who's having surgery, please ask for a copy of Preparing for Your Child's Surgery.    Preparing for surgery  Within 10 to 30 days of surgery: Have a pre-op exam (sometimes called an H&P, or History and Physical). This can be done at a clinic or pre-operative center.  If you're having a , you may not need this exam. Talk to your care team.  At your pre-op exam, talk to your care team about all medicines you take. If you need to stop any medicines before surgery, ask when to start taking them again.  We do this for your safety. Many medicines can make you bleed too much during surgery. Some change how well surgery (anesthesia) drugs work.  Call your insurance company to let them know you're having surgery. (If you don't have insurance, call [phone].)  Call your clinic if there's any change in your health. This includes signs of a cold or flu (sore throat, runny nose, cough, rash, fever). It also includes a scrape or scratch near the surgery site.  If you have questions on the day of surgery, call your hospital or surgery center.  Eating and drinking guidelines  For your safety: Unless your surgeon tells you otherwise,  follow the guidelines below.  Eat and drink as usual until 8 hours before you arrive for surgery. After that, no food or milk.  Drink clear liquids until 2 hours before you arrive. These are liquids you can see through, like water, Gatorade, and Propel Water. They also include plain black coffee and tea (no cream or milk), candy, and breath mints. You can spit out gum when you arrive.  If you drink alcohol: Stop drinking it the night before surgery.  If your care team tells you to take medicine on the morning of surgery, it's okay to take it with a sip of water.  Preventing infection  Shower or bathe the night before and morning of your surgery. Follow the instructions your clinic gave you. (If no instructions, use regular soap.)  Don't shave or clip hair near your surgery site. We'll remove the hair if needed.  Don't smoke or vape the morning of surgery. You may chew nicotine gum up to 2 hours before surgery. A nicotine patch is okay.  Note: Some surgeries require you to completely quit smoking and nicotine. Check with your surgeon.  Your care team will make every effort to keep you safe from infection. We will:  Clean our hands often with soap and water (or an alcohol-based hand rub).  Clean the skin at your surgery site with a special soap that kills germs.  Give you a special gown to keep you warm. (Cold raises the risk of infection.)  Wear special hair covers, masks, gowns and gloves during surgery.  Give antibiotic medicine, if prescribed. Not all surgeries need antibiotics.  What to bring on the day of surgery  Photo ID and insurance card  Copy of your health care directive, if you have one  Glasses and hearing aids (bring cases)  You can't wear contacts during surgery  Inhaler and eye drops, if you use them (tell us about these when you arrive)  CPAP machine or breathing device, if you use them  A few personal items, if spending the night  If you have . . .  A pacemaker, ICD (cardiac defibrillator) or other  implant: Bring the ID card.  An implanted stimulator: Bring the remote control.  A legal guardian: Bring a copy of the certified (court-stamped) guardianship papers.  Please remove any jewelry, including body piercings. Leave jewelry and other valuables at home.  If you're going home the day of surgery  You must have a responsible adult drive you home. They should stay with you overnight as well.  If you don't have someone to stay with you, and you aren't safe to go home alone, we may keep you overnight. Insurance often won't pay for this.  After surgery  If it's hard to control your pain or you need more pain medicine, please call your surgeon's office.  Questions?   If you have any questions for your care team, list them here: _________________________________________________________________________________________________________________________________________________________________________ ____________________________________ ____________________________________ ____________________________________  For informational purposes only. Not to replace the advice of your health care provider. Copyright   2003, 2019 [employer]. All rights reserved. Clinically reviewed by Salud Acosta MD. [employer] 305739 - REV 12/22.    How to Take Your Medication Before Surgery  Take just 1.5 mg of naltrexone tomorrow and then hold it before surgery

## 2024-07-22 ENCOUNTER — OFFICE VISIT (OUTPATIENT)
Dept: FAMILY MEDICINE | Facility: CLINIC | Age: 74
End: 2024-07-22
Payer: MEDICARE

## 2024-07-22 VITALS
OXYGEN SATURATION: 96 % | HEIGHT: 72 IN | SYSTOLIC BLOOD PRESSURE: 152 MMHG | RESPIRATION RATE: 15 BRPM | BODY MASS INDEX: 23.03 KG/M2 | TEMPERATURE: 97 F | DIASTOLIC BLOOD PRESSURE: 92 MMHG | HEART RATE: 65 BPM | WEIGHT: 170 LBS

## 2024-07-22 DIAGNOSIS — Z87.828 H/O SPINAL CORD INJURY: ICD-10-CM

## 2024-07-22 DIAGNOSIS — Z00.00 ENCOUNTER FOR MEDICARE ANNUAL WELLNESS EXAM: Primary | ICD-10-CM

## 2024-07-22 DIAGNOSIS — Z29.11 NEED FOR VACCINATION AGAINST RESPIRATORY SYNCYTIAL VIRUS: ICD-10-CM

## 2024-07-22 DIAGNOSIS — C61 PROSTATE CANCER (H): ICD-10-CM

## 2024-07-22 DIAGNOSIS — F43.23 ADJUSTMENT DISORDER WITH MIXED ANXIETY AND DEPRESSED MOOD: ICD-10-CM

## 2024-07-22 LAB
ALBUMIN SERPL BCG-MCNC: 4.1 G/DL (ref 3.5–5.2)
ALP SERPL-CCNC: 49 U/L (ref 40–150)
ALT SERPL W P-5'-P-CCNC: 19 U/L (ref 0–70)
ANION GAP SERPL CALCULATED.3IONS-SCNC: 8 MMOL/L (ref 7–15)
AST SERPL W P-5'-P-CCNC: 22 U/L (ref 0–45)
BILIRUB SERPL-MCNC: 0.3 MG/DL
BUN SERPL-MCNC: 12.5 MG/DL (ref 8–23)
CALCIUM SERPL-MCNC: 8.9 MG/DL (ref 8.8–10.4)
CHLORIDE SERPL-SCNC: 101 MMOL/L (ref 98–107)
CHOLEST SERPL-MCNC: 160 MG/DL
CREAT SERPL-MCNC: 0.66 MG/DL (ref 0.67–1.17)
EGFRCR SERPLBLD CKD-EPI 2021: >90 ML/MIN/1.73M2
ERYTHROCYTE [DISTWIDTH] IN BLOOD BY AUTOMATED COUNT: 12.7 % (ref 10–15)
FASTING STATUS PATIENT QL REPORTED: NO
FASTING STATUS PATIENT QL REPORTED: NO
GLUCOSE SERPL-MCNC: 103 MG/DL (ref 70–99)
HBA1C MFR BLD: 5.4 % (ref 0–5.6)
HCO3 SERPL-SCNC: 25 MMOL/L (ref 22–29)
HCT VFR BLD AUTO: 39.4 % (ref 40–53)
HDLC SERPL-MCNC: 72 MG/DL
HGB BLD-MCNC: 13.4 G/DL (ref 13.3–17.7)
LDLC SERPL CALC-MCNC: 74 MG/DL
MCH RBC QN AUTO: 31.9 PG (ref 26.5–33)
MCHC RBC AUTO-ENTMCNC: 34 G/DL (ref 31.5–36.5)
MCV RBC AUTO: 94 FL (ref 78–100)
NONHDLC SERPL-MCNC: 88 MG/DL
PLATELET # BLD AUTO: 271 10E3/UL (ref 150–450)
POTASSIUM SERPL-SCNC: 4.2 MMOL/L (ref 3.4–5.3)
PROT SERPL-MCNC: 6.8 G/DL (ref 6.4–8.3)
PSA SERPL DL<=0.01 NG/ML-MCNC: 13.3 NG/ML (ref 0–6.5)
RBC # BLD AUTO: 4.2 10E6/UL (ref 4.4–5.9)
SODIUM SERPL-SCNC: 134 MMOL/L (ref 135–145)
TRIGL SERPL-MCNC: 68 MG/DL
WBC # BLD AUTO: 6 10E3/UL (ref 4–11)

## 2024-07-22 PROCEDURE — 36415 COLL VENOUS BLD VENIPUNCTURE: CPT | Performed by: INTERNAL MEDICINE

## 2024-07-22 PROCEDURE — 83036 HEMOGLOBIN GLYCOSYLATED A1C: CPT | Mod: GZ | Performed by: INTERNAL MEDICINE

## 2024-07-22 PROCEDURE — G0439 PPPS, SUBSEQ VISIT: HCPCS | Performed by: INTERNAL MEDICINE

## 2024-07-22 PROCEDURE — 80061 LIPID PANEL: CPT | Performed by: INTERNAL MEDICINE

## 2024-07-22 PROCEDURE — 80053 COMPREHEN METABOLIC PANEL: CPT | Performed by: INTERNAL MEDICINE

## 2024-07-22 PROCEDURE — 85027 COMPLETE CBC AUTOMATED: CPT | Mod: GZ | Performed by: INTERNAL MEDICINE

## 2024-07-22 PROCEDURE — 84153 ASSAY OF PSA TOTAL: CPT | Performed by: INTERNAL MEDICINE

## 2024-07-22 PROCEDURE — 99213 OFFICE O/P EST LOW 20 MIN: CPT | Mod: 25 | Performed by: INTERNAL MEDICINE

## 2024-07-22 RX ORDER — SERTRALINE HYDROCHLORIDE 25 MG/1
TABLET, FILM COATED ORAL
Qty: 90 TABLET | Refills: 3 | Status: SHIPPED | OUTPATIENT
Start: 2024-07-22

## 2024-07-22 SDOH — HEALTH STABILITY: PHYSICAL HEALTH: ON AVERAGE, HOW MANY DAYS PER WEEK DO YOU ENGAGE IN MODERATE TO STRENUOUS EXERCISE (LIKE A BRISK WALK)?: 7 DAYS

## 2024-07-22 ASSESSMENT — SOCIAL DETERMINANTS OF HEALTH (SDOH): HOW OFTEN DO YOU GET TOGETHER WITH FRIENDS OR RELATIVES?: TWICE A WEEK

## 2024-07-22 ASSESSMENT — PAIN SCALES - GENERAL: PAINLEVEL: NO PAIN (0)

## 2024-07-22 NOTE — PROGRESS NOTES
Preventive Care Visit  [facility]  Kirk Chavez MD, Internal Medicine  Jul 22, 2024      Assessment & Plan     Encounter for Medicare annual wellness exam    - Comprehensive metabolic panel; Future  - CBC with platelets; Future  - Lipid panel reflex to direct LDL Fasting; Future  - Hemoglobin A1c; Future  - Comprehensive metabolic panel  - CBC with platelets  - Lipid panel reflex to direct LDL Fasting  - Hemoglobin A1c    Adjustment disorder with mixed anxiety and depressed mood  Try adding back low-dose of sertraline along with Wellbutrin to see if this mitigates some anxiety symptoms and hopefully with it being prescribed with the Wellbutrin, it does not cause too much sedation, if sedation is intolerable, he can stop the medication, plan for telephone follow-up in a few weeks to adjust dose and assess therapy  - sertraline (ZOLOFT) 25 MG tablet; One-half tablet once daily for one week, then increase to one whole tablet daily    Prostate cancer (H)  Recheck  - PSA tumor marker; Future  - PSA tumor marker    H/O spinal cord injury      Need for vaccination against respiratory syncytial virus  Recommended RSV and COVID shots at pharmacy    Patient has been advised of split billing requirements and indicates understanding: Yes        Counseling  Appropriate preventive services were addressed with this patient via screening, questionnaire, or discussion as appropriate for fall prevention, nutrition, physical activity, Tobacco-use cessation, weight loss and cognition.  Checklist reviewing preventive services available has been given to the patient.  Reviewed patient's diet, addressing concerns and/or questions.   He is at risk for psychosocial distress and has been provided with information to reduce risk.   Updated plan of care.  Patient reported difficulty with activities of daily living were addressed today.    FUTURE APPOINTMENTS:       - Follow-up for annual visit or as needed ; also  telephone follow-up in a few weeks to assess sertraline therapy    Subjective   Tawanda is a 74 year old, presenting for the following:  Physical          Health Care Directive  Patient does not have a Health Care Directive or Living Will: Patient states has Advance Directive and will bring in a copy to clinic.    HPI        Patient and wife describe more frequent episodes of anxiety and difficulty concentrating  He has been on sertraline in the past, but stopped it due to sedation side effects  Wellbutrin was started in its place  The Wellbutrin seem to help his depressive symptoms, but it is conceivable that it may not be helping as much with anxiety    Also, he has prostate cancer but has been undergoing a active surveillance strategy  He had a PSA checked last year          7/22/2024   General Health   How would you rate your overall physical health? Good   Feel stress (tense, anxious, or unable to sleep) Only a little      (!) STRESS CONCERN      7/22/2024   Nutrition   Diet: Regular (no restrictions)            7/22/2024   Exercise   Days per week of moderate/strenous exercise 7 days            7/22/2024   Social Factors   Frequency of gathering with friends or relatives Twice a week   Worry food won't last until get money to buy more No   Food not last or not have enough money for food? No   Do you have housing? (Housing is defined as stable permanent housing and does not include staying ouside in a car, in a tent, in an abandoned building, in an overnight shelter, or couch-surfing.) Yes   Are you worried about losing your housing? No   Lack of transportation? No   Unable to get utilities (heat,electricity)? No            7/22/2024   Fall Risk   Fallen 2 or more times in the past year? No   Trouble with walking or balance? No             7/22/2024   Activities of Daily Living- Home Safety   Needs help with the following daily activites Transportation    Shopping    Preparing meals    Housework    Bathing     Laundry    Medication administration    Toileting    Dressing   Safety concerns in the home None of the above       Multiple values from one day are sorted in reverse-chronological order         7/22/2024   Dental   Dentist two times every year? Yes            7/22/2024   Hearing Screening   Hearing concerns? None of the above            7/22/2024   Driving Risk Screening   Patient/family members have concerns about driving No            7/22/2024   General Alertness/Fatigue Screening   Have you been more tired than usual lately? No            7/22/2024   Urinary Incontinence Screening   Bothered by leaking urine in past 6 months No            7/22/2024   TB Screening   Were you born outside of the [place]? No            Today's PHQ-2 Score:       7/22/2024     2:37 PM   PHQ-2 ( 1999 [employer])   Q1: Little interest or pleasure in doing things 0   Q2: Feeling down, depressed or hopeless 1   PHQ-2 Score 1   Q1: Little interest or pleasure in doing things Not at all   Q2: Feeling down, depressed or hopeless Several days   PHQ-2 Score 1           7/22/2024   Substance Use   Alcohol more than 3/day or more than 7/wk No   Do you have a current opioid prescription? No   How severe/bad is pain from 1 to 10? 2/10   Do you use any other substances recreationally? No        Social History     Tobacco Use    Smoking status: Never    Smokeless tobacco: Never   Vaping Use    Vaping status: Never Used   Substance Use Topics    Alcohol use: Yes     Alcohol/week: 0.0 standard drinks of alcohol     Comment: 2  drinks per week    Drug use: Not Currently           7/22/2024   AAA Screening   Family history of Abdominal Aortic Aneurysm (AAA)? No      ASCVD Risk   The 10-year ASCVD risk score (Susana POSEY, et al., 2019) is: 15.5%    Values used to calculate the score:      Age: 74 years      Sex: Male      Is Non- : No      Diabetic: No      Tobacco smoker: No      Systolic Blood Pressure: 118 mmHg      Is BP  treated: No      HDL Cholesterol: 65 mg/dL      Total Cholesterol: 175 mg/dL            Reviewed and updated as needed this visit by Provider                    Past Medical History:   Diagnosis Date    Basal cell carcinoma     Malignant melanoma of other specified sites of skin 1980s    no recurrence    Personal history of other malignant neoplasm of skin 4/15/02    recurrent BCC scalp and face    Routine general medical examination at a health care facility 4/12/02    Scrotal varices     L scrotum     Past Surgical History:   Procedure Laterality Date    COLONOSCOPY  11/19/2013    Procedure: COMBINED COLONOSCOPY, SINGLE BIOPSY/POLYPECTOMY BY BIOPSY;  COLONOSCOPY;  Surgeon: Adam Lopez MD;  Location:  GI    COLONOSCOPY N/A 9/29/2016    Procedure: COLONOSCOPY;  Surgeon: Paul Ken MD;  Location:  GI    Z NONSPECIFIC PROCEDURE  6/27/02    colonoscopy with polypectomy x3, fulguration x1    [facility] NONSPECIFIC PROCEDURE      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    Z NONSPECIFIC PROCEDURE      multiple skin Bxs    [facility] NONSPECIFIC PROCEDURE      tonsillectomy     BP Readings from Last 3 Encounters:   07/22/24 (!) 152/92   02/26/24 118/77   09/27/23 (!) 141/77    Wt Readings from Last 3 Encounters:   07/22/24 77.1 kg (170 lb)   07/17/23 77.1 kg (170 lb)   06/20/22 74.8 kg (165 lb)                  Patient Active Problem List   Diagnosis    Special screening for malignant neoplasm of prostate    CARDIOVASCULAR SCREENING; LDL GOAL LESS THAN 160    Basal cell carcinoma of skin of trunk    Basal cell carcinoma of nose    Sessile colonic polyp    Right shoulder pain, unspecified chronicity    Cervical segment dysfunction    Right supraspinatus tendinitis    Thoracic segment dysfunction    Pancreas cyst    Prostate cancer (H)    H/O spinal cord injury    Chronic incomplete spastic tetraplegia (H)    Adjustment disorder with depressed mood     Past Surgical History:   Procedure Laterality Date    COLONOSCOPY   11/19/2013    Procedure: COMBINED COLONOSCOPY, SINGLE BIOPSY/POLYPECTOMY BY BIOPSY;  COLONOSCOPY;  Surgeon: Adam Lopez MD;  Location:  GI    COLONOSCOPY N/A 9/29/2016    Procedure: COLONOSCOPY;  Surgeon: Paul Ken MD;  Location:  GI    [facility] NONSPECIFIC PROCEDURE  6/27/02    colonoscopy with polypectomy x3, fulguration x1    [facility] NONSPECIFIC PROCEDURE      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    [facility] NONSPECIFIC PROCEDURE      multiple skin Bxs    [facility] NONSPECIFIC PROCEDURE      tonsillectomy       Social History     Tobacco Use    Smoking status: Never    Smokeless tobacco: Never   Substance Use Topics    Alcohol use: Yes     Alcohol/week: 0.0 standard drinks of alcohol     Comment: 2  drinks per week     Family History   Problem Relation Age of Onset    Cancer Mother         thyroid and lung    Prostate Cancer Father 65        uncle also had prostate Ca    Cerebrovascular Disease Father     Hypertension Father     Arthritis Daughter     Skin Cancer Daughter     Breast Cancer Sister     Cancer - colorectal No family hx of     Diabetes No family hx of          Current Outpatient Medications   Medication Sig Dispense Refill    Acetaminophen 325 MG CAPS Take 325-650 mg by mouth as needed      baclofen (LIORESAL) 10 MG tablet >>>IF PUMP FAILS  Take 10 mg six times a day IF PUMP FAILS<<<      buPROPion (WELLBUTRIN XL) 150 MG 24 hr tablet Take 1 tablet (150 mg) by mouth every morning 90 tablet 3    Ethyl Alcohol, Skin Cleanser, 62 % LIQD Apply to hands as needed prior to self cath. 473 mL 1    LINZESS 72 MCG capsule Take 72 mcg by mouth every morning (before breakfast)      Naltrexone HCl, Pain, (LOTREXONE) 1.5 MG CAPS Take 4.5 mg by mouth daily      polyethylene glycol (MIRALAX) 17 g packet Take 1 packet by mouth daily      psyllium (METAMUCIL/KONSYL) Packet Take 1 packet by mouth daily      Sennosides-Docusate Sodium 8.6-50 MG CAPS Take 2 capsules by mouth daily      sertraline (ZOLOFT) 25 MG tablet  One-half tablet once daily for one week, then increase to one whole tablet daily 90 tablet 3    sodium chloride (SHANNA 128) 5 % ophthalmic ointment 1 Application as needed for dry eyes      Vitamin D, Cholecalciferol, 25 MCG (1000 UT) CAPS Take by mouth daily       NITRO-BID 2 % OINT ointment Place onto the skin as needed (Patient not taking: Reported on 7/22/2024)       Allergies   Allergen Reactions    No Known Drug Allergy      Current providers sharing in care for this patient include:  Patient Care Team:  Kirk Chavez MD as PCP - General (Internal Medicine)  Kirk Chavez MD as Assigned PCP  Shawn Holcomb MD as MD (Dermatology)  Shawn Holcomb MD as Assigned Surgical Provider  Nas Bolivar MD as Assigned Neuroscience Provider    The following health maintenance items are reviewed in Epic and correct as of today:  Health Maintenance   Topic Date Due    RSV VACCINE (Pregnancy & 60+) (1 - 1-dose 60+ series) Never done    COVID-19 Vaccine (8 - 2023-24 season) 03/27/2024    MEDICARE ANNUAL WELLNESS VISIT  07/17/2024    ANNUAL REVIEW OF HM ORDERS  08/07/2024    INFLUENZA VACCINE (1) 09/01/2024    GLUCOSE  05/05/2025    FALL RISK ASSESSMENT  07/22/2025    LIPID  05/05/2027    ADVANCE CARE PLANNING  07/17/2028    COLORECTAL CANCER SCREENING  06/04/2029    DTAP/TDAP/TD IMMUNIZATION (3 - Td or Tdap) 06/11/2029    HEPATITIS C SCREENING  Completed    PHQ-2 (once per calendar year)  Completed    Pneumococcal Vaccine: 65+ Years  Completed    ZOSTER IMMUNIZATION  Completed    IPV IMMUNIZATION  Aged Out    HPV IMMUNIZATION  Aged Out    MENINGITIS IMMUNIZATION  Aged Out    RSV MONOCLONAL ANTIBODY  Aged Out            Objective    Exam  There were no vitals taken for this visit.   Estimated body mass index is 23.06 kg/m  as calculated from the following:    Height as of 7/17/23: 1.829 m (6').    Weight as of 7/17/23: 77.1 kg (170 lb).    Physical Exam  General: This is a well-appearing man in  no acute distress.  He appears comfortable.  HEENT: The bilateral tympanic membranes are normal, the nasal exam is normal, the posterior pharynx appears normal, the neck is supple without adenopathy.  Cardiovascular: The heart has a regular rate and rhythm.  Pulmonary: The lungs are clear to auscultation bilaterally, breathing does not appear to be labored.  Abdomen: Soft, not distended, not tender, bowel sounds present.  Extremities: Foot drop braces are present, no appreciable lower extremity edema.  Mental state: Affect seems more flat than previous visit, anxious at times, well-groomed.  Neurological: Alert and oriented to person place and time, cranial nerves II to XII appear grossly intact, tone seems less spastic than previous visits.         7/22/2024   Mini Cog   Clock Draw Score 2 Normal   3 Item Recall 1 object recalled   Mini Cog Total Score 3                 Signed Electronically by: Kirk Chavez MD

## 2024-07-22 NOTE — PATIENT INSTRUCTIONS
For excessive gas, try switching metamucil to Citrucel fiber supplement.  You could also try over the counter Beano enzyme supplement as directed.  You should get the RSV (Respiratory Syncytial Virus) vaccine at a pharmacy.   Patient Education   Preventive Care Advice   This is general advice given by our system to help you stay healthy. However, your care team may have specific advice just for you. Please talk to your care team about your preventive care needs.  Nutrition  Eat 5 or more servings of fruits and vegetables each day.  Try wheat bread, brown rice and whole grain pasta (instead of white bread, rice, and pasta).  Get enough calcium and vitamin D. Check the label on foods and aim for 100% of the RDA (recommended daily allowance).  Lifestyle  Exercise at least 150 minutes each week  (30 minutes a day, 5 days a week).  Do muscle strengthening activities 2 days a week. These help control your weight and prevent disease.  No smoking.  Wear sunscreen to prevent skin cancer.  Have a dental exam and cleaning every 6 months.  Yearly exams  See your health care team every year to talk about:  Any changes in your health.  Any medicines your care team has prescribed.  Preventive care, family planning, and ways to prevent chronic diseases.  Shots (vaccines)   HPV shots (up to age 26), if you've never had them before.  Hepatitis B shots (up to age 59), if you've never had them before.  COVID-19 shot: Get this shot when it's due.  Flu shot: Get a flu shot every year.  Tetanus shot: Get a tetanus shot every 10 years.  Pneumococcal, hepatitis A, and RSV shots: Ask your care team if you need these based on your risk.  Shingles shot (for age 50 and up)  General health tests  Diabetes screening:  Starting at age 35, Get screened for diabetes at least every 3 years.  If you are younger than age 35, ask your care team if you should be screened for diabetes.  Cholesterol test: At age 39, start having a cholesterol test every 5  years, or more often if advised.  Bone density scan (DEXA): At age 50, ask your care team if you should have this scan for osteoporosis (brittle bones).  Hepatitis C: Get tested at least once in your life.  STIs (sexually transmitted infections)  Before age 24: Ask your care team if you should be screened for STIs.  After age 24: Get screened for STIs if you're at risk. You are at risk for STIs (including HIV) if:  You are sexually active with more than one person.  You don't use condoms every time.  You or a partner was diagnosed with a sexually transmitted infection.  If you are at risk for HIV, ask about PrEP medicine to prevent HIV.  Get tested for HIV at least once in your life, whether you are at risk for HIV or not.  Cancer screening tests  Cervical cancer screening: If you have a cervix, begin getting regular cervical cancer screening tests starting at age 21.  Breast cancer scan (mammogram): If you've ever had breasts, begin having regular mammograms starting at age 40. This is a scan to check for breast cancer.  Colon cancer screening: It is important to start screening for colon cancer at age 45.  Have a colonoscopy test every 10 years (or more often if you're at risk) Or, ask your provider about stool tests like a FIT test every year or Cologuard test every 3 years.  To learn more about your testing options, visit:   .  For help making a decision, visit:   https://bit.ly/sj52265.  Prostate cancer screening test: If you have a prostate, ask your care team if a prostate cancer screening test (PSA) at age 55 is right for you.  Lung cancer screening: If you are a current or former smoker ages 50 to 80, ask your care team if ongoing lung cancer screenings are right for you.  For informational purposes only. Not to replace the advice of your health care provider. Copyright   2023 [employer]. All rights reserved. Clinically reviewed by the [facility] Transitions Program. [employer] 910904 -  REV 01/24.  Learning About Activities of Daily Living  What are activities of daily living?     Activities of daily living (ADLs) are the basic self-care tasks you do every day. These include eating, bathing, dressing, and moving around.  As you age, and if you have health problems, you may find that it's harder to do some of these tasks. If so, your doctor can suggest ideas that may help.  To measure what kind of help you may need, your doctor will ask how well you are able to do ADLs. Let your doctor know if there are any tasks that you are having trouble doing. This is an important first step to getting help. And when you have the help you need, you can stay as independent as possible.  How will a doctor assess your ADLs?  Asking about ADLs is part of a routine health checkup your doctor will likely do as you age. Your health check might be done in a doctor's office, in your home, or at a hospital. The goal is to find out if you are having any problems that could make it hard to care for yourself or that make it unsafe for you to be on your own.  To measure your ADLs, your doctor will ask how hard it is for you to do routine tasks. Your doctor may also want to know if you have changed the way you do a task because of a health problem. Your doctor may watch how you:  Walk back and forth.  Keep your balance while you stand or walk.  Move from sitting to standing or from a bed to a chair.  Button or unbutton a shirt or sweater.  Remove and put on your shoes.  It's common to feel a little worried or anxious if you find you can't do all the things you used to be able to do. Talking with your doctor about ADLs is a way to make sure you're as safe as possible and able to care for yourself as well as you can. You may want to bring a caregiver, friend, or family member to your checkup. They can help you talk to your doctor.  Follow-up care is a key part of your treatment and safety. Be sure to make and go to all  appointments, and call your doctor if you are having problems. It's also a good idea to know your test results and keep a list of the medicines you take.  Current as of: October 24, 2023               Content Version: 14.0    6219-8724 [employer].   Care instructions adapted under license by your healthcare professional. If you have questions about a medical condition or this instruction, always ask your healthcare professional. [employer] disclaims any warranty or liability for your use of this information.

## 2024-07-23 NOTE — RESULT ENCOUNTER NOTE
The following letter pertains to your most recent diagnostic tests:    -Liver and gallbladder tests are normal for you. (ALT,AST, Alk phos, bilirubin), kidney function is normal for you (Creatinine, GFR), Sodium is normal, Potassium is normal for you, Calcium is normal for you, Glucose (blood sugar) is normal for you.      -The PSA is essentially staying stable which is good news for you.       -Your cholesterol panel looks healthy.     -Your complete blood counts including your hemoglobin returned normal for you.         -Your hemoglobin A1c test which averages your blood sugars over the last 3 months returned normal.  No evidence for diabetes or prediabetes.         Bottom line:  These results look stable and OK for you.        Follow up:  Let's talk in a few weeks to see how the sertraline (Zoloft) is working for you.        Sincerely,    Dr. Chavez

## 2024-08-22 ENCOUNTER — VIRTUAL VISIT (OUTPATIENT)
Dept: FAMILY MEDICINE | Facility: CLINIC | Age: 74
End: 2024-08-22
Attending: INTERNAL MEDICINE
Payer: MEDICARE

## 2024-08-22 DIAGNOSIS — R19.8 ALTERNATING CONSTIPATION AND DIARRHEA: ICD-10-CM

## 2024-08-22 DIAGNOSIS — F43.23 ADJUSTMENT DISORDER WITH MIXED ANXIETY AND DEPRESSED MOOD: Primary | ICD-10-CM

## 2024-08-22 PROCEDURE — 99443 PR PHYSICIAN TELEPHONE EVALUATION 21-30 MIN: CPT | Mod: 93 | Performed by: INTERNAL MEDICINE

## 2024-08-22 NOTE — PROGRESS NOTES
Tawanda is a 74 year old who is being evaluated via a billable telephone visit.    What phone number would you like to be contacted at? [phone]  How would you like to obtain your AVS? [employer]  Originating Location (pt. Location): Home    Distant Location (provider location):  On-site    Assessment & Plan     Adjustment disorder with mixed anxiety and depressed mood  Continue current Zoloft and Wellbutrin combination until psychiatry consult    Alternating constipation and diarrhea  Discussed strategies for helping with stool problems.  Suggested the possibility of a colon and rectal surgery consult.  He declined that.  I agree with his PMNR specialist that additional fiber is probably the key to help bulk up the stools and prevent the fecal incontinence.  Another possibility would be to reduce the dose of Linzess.            FUTURE APPOINTMENTS:       - Follow-up for annual visit or as needed    Subjective   Tawanda is a 74 year old, presenting for the following health issues:  Recheck Medication, Refill Request, and Follow Up (Bowel incont)        8/22/2024     9:59 AM   Additional Questions   Roomed by Margaret   Accompanied by wife dylan ALFORD     The low-dose of sertraline seems to be well-tolerated and is helping prevent the episodes that were suspicious for panic attacks  His PMNR specialist also referred him to a psychiatrist and he will be seeing that specialist shortly for a second opinion  The majority of this extended telephone conversation was spent discussing his problem with fecal incontinence  Unfortunately, things did not improve by switching the Metamucil to Citrucel            Objective    Vitals - Patient Reported  Pain Score: No Pain (0)        Physical Exam   General: Alert and no distress //Respiratory: No audible wheeze, cough, or shortness of breath // Psychiatric:  Appropriate affect, tone, and pace of words            Phone call duration: 29 minutes  Signed Electronically by: Kirk Chavez,  MD

## 2024-09-02 DIAGNOSIS — F43.21 ADJUSTMENT DISORDER WITH DEPRESSED MOOD: ICD-10-CM

## 2024-09-03 RX ORDER — BUPROPION HYDROCHLORIDE 150 MG/1
150 TABLET ORAL EVERY MORNING
Qty: 90 TABLET | Refills: 3 | Status: SHIPPED | OUTPATIENT
Start: 2024-09-03

## 2024-12-16 ENCOUNTER — OFFICE VISIT (OUTPATIENT)
Dept: DERMATOLOGY | Facility: CLINIC | Age: 74
End: 2024-12-16
Payer: MEDICARE

## 2024-12-16 DIAGNOSIS — L82.1 SEBORRHEIC KERATOSIS: ICD-10-CM

## 2024-12-16 DIAGNOSIS — Z12.83 SKIN CANCER SCREENING: Primary | ICD-10-CM

## 2024-12-16 DIAGNOSIS — D48.9 NEOPLASM OF UNCERTAIN BEHAVIOR: ICD-10-CM

## 2024-12-16 DIAGNOSIS — D18.01 CHERRY ANGIOMA: ICD-10-CM

## 2024-12-16 DIAGNOSIS — D22.9 MULTIPLE NEVI: ICD-10-CM

## 2024-12-16 DIAGNOSIS — Z85.820 HISTORY OF MELANOMA: ICD-10-CM

## 2024-12-16 DIAGNOSIS — Z85.828 HISTORY OF NONMELANOMA SKIN CANCER: ICD-10-CM

## 2024-12-16 DIAGNOSIS — L81.4 LENTIGO: ICD-10-CM

## 2024-12-16 DIAGNOSIS — L57.0 ACTINIC KERATOSIS: ICD-10-CM

## 2024-12-16 PROCEDURE — 88305 TISSUE EXAM BY PATHOLOGIST: CPT | Performed by: PATHOLOGY

## 2024-12-16 NOTE — LETTER
12/16/2024      José Antonio Saha  [address]  [place] [address]      Dear Colleague,    Thank you for referring your patient, José Antonio Saha, to the [facility]. Please see a copy of my visit note below.    [facility] Dermatology Note  Encounter Date: Dec 16, 2024  Office Visit     Reviewed patients past medical history and pertinent chart review prior to patients visit today.     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  DDx includes BCC vs inflamed seborrheic keratosis. Shave biopsy today.  Photograph obtained.  # Neoplasm of uncertain behavior:  left cheek.  DDx includes BCC. Shave biopsy today.  Photograph obtained.  # Neoplasm of uncertain behavior:  mid frontal scalp.  DDx includes BCC. Shave biopsy today.  Photograph obtained.  Procedure Note: Biopsy by shave technique  The risks and benefits of the procedure were described to the patient. These include but are not limited to bleeding, infection, scar, incomplete removal, and non-diagnostic biopsy. Verbal informed consent was  obtained. The above site(s) was cleansed with an alcohol pad and injected with 1% lidocaine with epinephrine. Once anesthesia was obtained, a biopsy(ies) was performed with Gilette blade. The tissue(s) was placed in a labeled container(s) with formalin and sent to pathology. Hemostasis was achieved with aluminum chloride. Vaseline and a bandage were applied to the wound(s). The patient tolerated the procedure well and was given post biopsy care instructions.    # Actinic keratoses, mid upper vermilion border of lip, right nasal dorsum, left parietal scalp, and right vertex scalp  Actinic keratoses are pre-cancerous skin growths caused by sun exposure. Treatment is recommended and medically indicated. Treated with cryotherapy as outlined below.     Procedures performed:   - Cryotherapy procedure note, location(s): See physical exam. After verbal consent and discussion of risks and benefits including, but not limited to, dyspigmentation/scar, blister, and pain, 4 lesion(s) was(were) treated with 1-2 mm freeze border for 1-2 cycles with liquid nitrogen. Post cryotherapy instructions were provided.     # Personal history of nonmelanoma skin cancer  # Personal history of melanoma  - No signs of recurrence. Continued observation recommended.   - Advised to monitor for changing, non-healing, bleeding, painful, changing, or otherwise symptomatic lesions  - Sun protection: Counseled SPF 30+ sunscreen, UPF clothing, sun avoidance, tanning bed avoidance.  -AVS printed    # Multiple nevi, trunk and extremities  # Solar lentigines  - Nevi demonstrate no concerning features on dermoscopy. We discussed the importance of self exams at home.   - ABCDEs: Counseled ABCDEs of melanoma: Asymmetry, Border (irregularity), Color (not uniform, changes in color), Diameter (greater than 6 mm which is about the size of a pencil eraser), and Evolving (any changes in preexisting moles).    # Cherry angiomas  # Seborrheic keratoses  - We  discussed the benign nature of the skin lesions. No treatment required. Continued observation recommended. Follow up with any concerns.      Follow-up:  6 months for follow up full body skin exam, as needed for new or changing lesions or new concerns    All risks, benefits and alternatives were discussed with patient.  Patient is in agreement and understands the assessment and plan.  All questions were answered.  Veena Arnold PA-C  [facility] Dermatology  ____________________________________________    CC: Skin Check (FBSC/1. Scalp /2. Upper lip/3. Neck/)     HPI:  Mr. José Antonio Saha is a(n) 74 year old male who presents today as a return patient for a full body skin cancer screening. He is with his wife. The patient has a history of melanoma and non melanoma skin cancer as outlined above. The patient was last seen in dermatology on 8/3/2023 by Dr. Holcomb for Mohs micrographic surgery of a basal cell carcinoma on the left side burn.    Today, patient has concerns today about several lesions. He has a lesion on the scalp that peels off and bleeds. He has a lesion on the mid upper lip that develops hyperkeratosis, bleeds, resolves, then returns. Lastly, he has a lesion on the left anterior neck that was previously crusted.    Patient is otherwise feeling well, without additional skin concerns.     Physical Exam:  Vitals: There were no vitals taken for this visit.   SKIN: Total skin excluding the feet and shins was performed. The exam included the scalp, face, neck, bilateral arms, chest, back, abdomen, bilateral legs, genitalia, mons pubis, buttocks, and nails.   - Urban II.  -left anterior neck, 1.0 cm shiny pink papule  -mid frontal scalp, 0.3 cm pink, pearly papule(s) with arborizing telangectasia's and a central erosion  -left cheek, 0.4 cm pink, pearly papule(s) with arborizing telangectasias  -mid upper vermilion border of lip, right nasal dorsum, left parietal scalp, and right vertex scalp, pink  macule(s) with overlying adherent scale and hyperkeratosis consistent with an actinic keratosis   - Multiple tan/brown macules and papules scattered throughout exam, consistent with benign nevi. No concerning features on dermoscopy.   - Scattered tan, homogenous macules scattered on sun exposed skin, consistent with solar lentigines.   - Scattered waxy, stuck on appearing papules and patches, consistent with seborrheic keratoses.  - Several 1-2 mm red dome shaped symmetric papules, consistent with cherry angiomas.   - No other lesions of concern on areas examined.                   Medications:  Current Outpatient Medications   Medication Sig Dispense Refill     Acetaminophen 325 MG CAPS Take 325-650 mg by mouth as needed       baclofen (LIORESAL) 10 MG tablet >>>IF PUMP FAILS  Take 10 mg six times a day IF PUMP FAILS<<<       buPROPion (WELLBUTRIN XL) 150 MG 24 hr tablet TAKE 1 TABLET BY MOUTH EVERY MORNING 90 tablet 3     Ethyl Alcohol, Skin Cleanser, 62 % LIQD Apply to hands as needed prior to self cath. 473 mL 1     LINZESS 72 MCG capsule Take 72 mcg by mouth every morning (before breakfast)       Naltrexone HCl, Pain, (LOTREXONE) 1.5 MG CAPS Take 4.5 mg by mouth daily       NITRO-BID 2 % OINT ointment Place onto the skin as needed.       polyethylene glycol (MIRALAX) 17 g packet Take 1 packet by mouth daily       psyllium (METAMUCIL/KONSYL) Packet Take 1 packet by mouth daily       Sennosides-Docusate Sodium 8.6-50 MG CAPS Take 2 capsules by mouth daily       sertraline (ZOLOFT) 25 MG tablet One-half tablet once daily for one week, then increase to one whole tablet daily 90 tablet 3     sodium chloride (SHANNA 128) 5 % ophthalmic ointment 1 Application as needed for dry eyes       Vitamin D, Cholecalciferol, 25 MCG (1000 UT) CAPS Take by mouth daily        No current facility-administered medications for this visit.      Past Medical History:   Patient Active Problem List   Diagnosis     Special screening for  malignant neoplasm of prostate     CARDIOVASCULAR SCREENING; LDL GOAL LESS THAN 160     Basal cell carcinoma of skin of trunk     Basal cell carcinoma of nose     Sessile colonic polyp     Right shoulder pain, unspecified chronicity     Cervical segment dysfunction     Right supraspinatus tendinitis     Thoracic segment dysfunction     Pancreas cyst     Prostate cancer (H)     H/O spinal cord injury     Chronic incomplete spastic tetraplegia (H)     Adjustment disorder with depressed mood     Past Medical History:   Diagnosis Date     Basal cell carcinoma      Malignant melanoma of other specified sites of skin 1980s    no recurrence     Personal history of other malignant neoplasm of skin 4/15/02    recurrent BCC scalp and face     Routine general medical examination at a [facility] care facility 4/12/02     Scrotal varices     L scrotum       CC Referred Self, MD  No address on file on close of this encounter.      Again, thank you for allowing me to participate in the care of your patient.        Sincerely,        Sherri Arnold PA-C

## 2024-12-16 NOTE — PROGRESS NOTES
[facility] Dermatology Note  Encounter Date: Dec 16, 2024  Office Visit     Reviewed patients past medical history and pertinent chart review prior to patients visit today.     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)  ____________________________________________    Assessment & Plan:     # Neoplasm of uncertain behavior:  left anterior neck.  DDx includes BCC vs inflamed seborrheic keratosis. Shave biopsy today.  Photograph obtained.  # Neoplasm of uncertain behavior:  left cheek.  DDx includes BCC. Shave biopsy today.  Photograph obtained.  # Neoplasm of uncertain behavior:  mid frontal scalp.  DDx includes BCC. Shave biopsy today.  Photograph obtained.  Procedure Note: Biopsy by shave technique  The risks and benefits of the procedure were described to the patient. These include but are not limited to bleeding, infection, scar, incomplete removal, and non-diagnostic biopsy. Verbal informed consent was obtained. The above site(s) was cleansed with an alcohol pad and injected with 1% lidocaine with epinephrine. Once anesthesia was obtained, a biopsy(ies) was performed with Gilette blade. The tissue(s) was placed in a labeled container(s) with formalin and  sent to pathology. Hemostasis was achieved with aluminum chloride. Vaseline and a bandage were applied to the wound(s). The patient tolerated the procedure well and was given post biopsy care instructions.    # Actinic keratoses, mid upper vermilion border of lip, right nasal dorsum, left parietal scalp, and right vertex scalp  Actinic keratoses are pre-cancerous skin growths caused by sun exposure. Treatment is recommended and medically indicated. Treated with cryotherapy as outlined below.     Procedures performed:   - Cryotherapy procedure note, location(s): See physical exam. After verbal consent and discussion of risks and benefits including, but not limited to, dyspigmentation/scar, blister, and pain, 4 lesion(s) was(were) treated with 1-2 mm freeze border for 1-2 cycles with liquid nitrogen. Post cryotherapy instructions were provided.     # Personal history of nonmelanoma skin cancer  # Personal history of melanoma  - No signs of recurrence. Continued observation recommended.   - Advised to monitor for changing, non-healing, bleeding, painful, changing, or otherwise symptomatic lesions  - Sun protection: Counseled SPF 30+ sunscreen, UPF clothing, sun avoidance, tanning bed avoidance.  -AVS printed    # Multiple nevi, trunk and extremities  # Solar lentigines  - Nevi demonstrate no concerning features on dermoscopy. We discussed the importance of self exams at home.   - ABCDEs: Counseled ABCDEs of melanoma: Asymmetry, Border (irregularity), Color (not uniform, changes in color), Diameter (greater than 6 mm which is about the size of a pencil eraser), and Evolving (any changes in preexisting moles).    # Cherry angiomas  # Seborrheic keratoses  - We discussed the benign nature of the skin lesions. No treatment required. Continued observation recommended. Follow up with any concerns.      Follow-up:  6 months for follow up full body skin exam, as needed for new or changing lesions or new concerns    All risks,  benefits and alternatives were discussed with patient.  Patient is in agreement and understands the assessment and plan.  All questions were answered.  [facility] Dermatology  ____________________________________________    CC: Skin Check (FBSC/1. Scalp /2. Upper lip/3. Neck/)     HPI:  Mr. José Antonio Saha is a(n) 74 year old male who presents today as a return patient for a full body skin cancer screening. He is with his wife. The patient has a history of melanoma and non melanoma skin cancer as outlined above. The patient was last seen in dermatology on 8/3/2023 by Dr. Holcomb for Mohs micrographic surgery of a basal cell carcinoma on the left side burn.    Today, patient has concerns today about several lesions. He has a lesion on the scalp that peels off and bleeds. He has a lesion on the mid upper lip that develops hyperkeratosis, bleeds, resolves, then returns. Lastly, he has a lesion on the left anterior neck that was previously crusted.    Patient is otherwise feeling well, without additional skin concerns.     Physical Exam:  Vitals: There were no vitals taken for this visit.   SKIN: Total skin excluding the feet and shins was performed. The exam included the scalp, face, neck, bilateral arms, chest, back, abdomen, bilateral legs, genitalia, mons pubis, buttocks, and nails.   - Urban II.  -left anterior neck, 1.0 cm shiny pink papule  -mid frontal scalp, 0.3 cm pink, pearly papule(s) with arborizing telangectasia's and a central erosion  -left cheek, 0.4 cm pink, pearly papule(s) with arborizing telangectasias  -mid upper vermilion border of lip, right nasal dorsum, left parietal scalp, and right vertex scalp, pink macule(s) with overlying adherent scale and hyperkeratosis consistent with an actinic keratosis   - Multiple tan/brown macules and papules scattered throughout exam, consistent with benign nevi. No concerning features on dermoscopy.   - Scattered tan, homogenous  macules scattered on sun exposed skin, consistent with solar lentigines.   - Scattered waxy, stuck on appearing papules and patches, consistent with seborrheic keratoses.  - Several 1-2 mm red dome shaped symmetric papules, consistent with cherry angiomas.   - No other lesions of concern on areas examined.                   Medications:  Current Outpatient Medications   Medication Sig Dispense Refill    Acetaminophen 325 MG CAPS Take 325-650 mg by mouth as needed      baclofen (LIORESAL) 10 MG tablet >>>IF PUMP FAILS  Take 10 mg six times a day IF PUMP FAILS<<<      buPROPion (WELLBUTRIN XL) 150 MG 24 hr tablet TAKE 1 TABLET BY MOUTH EVERY MORNING 90 tablet 3    Ethyl Alcohol, Skin Cleanser, 62 % LIQD Apply to hands as needed prior to self cath. 473 mL 1    LINZESS 72 MCG capsule Take 72 mcg by mouth every morning (before breakfast)      Naltrexone HCl, Pain, (LOTREXONE) 1.5 MG CAPS Take 4.5 mg by mouth daily      NITRO-BID 2 % OINT ointment Place onto the skin as needed.      polyethylene glycol (MIRALAX) 17 g packet Take 1 packet by mouth daily      psyllium (METAMUCIL/KONSYL) Packet Take 1 packet by mouth daily      Sennosides-Docusate Sodium 8.6-50 MG CAPS Take 2 capsules by mouth daily      sertraline (ZOLOFT) 25 MG tablet One-half tablet once daily for one week, then increase to one whole tablet daily 90 tablet 3    sodium chloride (SHANNA 128) 5 % ophthalmic ointment 1 Application as needed for dry eyes      Vitamin D, Cholecalciferol, 25 MCG (1000 UT) CAPS Take by mouth daily        No current facility-administered medications for this visit.      Past Medical History:   Patient Active Problem List   Diagnosis    Special screening for malignant neoplasm of prostate    CARDIOVASCULAR SCREENING; LDL GOAL LESS THAN 160    Basal cell carcinoma of skin of trunk    Basal cell carcinoma of nose    Sessile colonic polyp    Right shoulder pain, unspecified chronicity    Cervical segment dysfunction    Right  supraspinatus tendinitis    Thoracic segment dysfunction    Pancreas cyst    Prostate cancer (H)    H/O spinal cord injury    Chronic incomplete spastic tetraplegia (H)    Adjustment disorder with depressed mood     Past Medical History:   Diagnosis Date    Basal cell carcinoma     Malignant melanoma of other specified sites of skin 1980s    no recurrence    Personal history of other malignant neoplasm of skin 4/15/02    recurrent BCC scalp and face    Routine general medical examination at a health care facility 4/12/02    Scrotal varices     L scrotum       CC Referred Self, MD  No address on file on close of this encounter.

## 2024-12-16 NOTE — PATIENT INSTRUCTIONS
Wound Care After a Biopsy    What is a skin biopsy?  A skin biopsy allows the doctor to examine a very small piece of tissue under the microscope to determine the diagnosis and the best treatment for the skin condition. A local anesthetic (numbing medicine)  is injected with a very small needle into the skin area to be tested. A small piece of skin is taken from the area. Sometimes a suture (stitch) is used.     What are the risks of a skin biopsy?  I will experience scar, bleeding, swelling, pain, crusting and redness. I may experience incomplete removal or recurrence. Risks of this procedure are excessive bleeding, bruising, infection, nerve damage, numbness, thick (hypertrophic or keloidal) scar and non-diagnostic biopsy.    How should I care for my wound for the first 24 hours?  Keep the wound dry and covered for 24 hours  If it bleeds, hold direct pressure on the area for 15 minutes. If bleeding does not stop then go to the emergency room  Avoid strenuous exercise the first 1-2 days or as your doctor instructs you    How should I care for the wound after 24 hours?  After 24 hours, remove the bandage  You may bathe or shower as normal  If you had a scalp biopsy, you can shampoo as usual and can use shower water to clean the biopsy site daily  Clean the wound twice a day with gentle soap and water  Do not scrub, be gentle  Apply white petroleum/Vaseline after cleaning the wound with a cotton swab or a clean finger, and keep the site covered with a Bandaid /bandage. Bandages are not necessary with a scalp biopsy  If you are unable to cover the site with a Bandaid /bandage, re-apply ointment 2-3 times a day to keep the site moist. Moisture will help with healing  Avoid strenuous activity for first 1-2 days  Avoid lakes, rivers, pools, and oceans until the stitches are removed or the site is healed    How do I clean my wound?  Wash hands thoroughly with soap or use hand  before all wound care  Clean the  wound with gentle soap and water  Apply white petroleum/Vaseline  to wound after it is clean  Replace the Bandaid /bandage to keep the wound covered for the first few days or as instructed by your doctor  If you had a scalp biopsy, warm shower water to the area on a daily basis should suffice    What should I use to clean my wound?   Cotton-tipped applicators (Qtips )  White petroleum jelly (Vaseline ). Use a clean new container and use Q-tips to apply.  Bandaids   as needed  Gentle soap     How should I care for my wound long term?  Do not get your wound dirty  Keep up with wound care for one week or until the area is healed.  A small scab will form and fall off by itself when the area is completely healed. The area will be red and will become pink in color as it heals. Sun protection is very important for how your scar will turn out. Sunscreen with an SPF 30 or greater is recommended once the area is healed.  You should have some soreness but it should be mild and slowly go away over several days. Talk to your doctor about using tylenol for pain,    When should I call my doctor?  If you have increased:   Pain or swelling  Pus or drainage (clear or slightly yellow drainage is ok)  Temperature over 100F  Spreading redness or warmth around wound    When will I hear about my results?  The biopsy results can take 2 weeks to come back.  Your results will automatically release to [employer] before your provider has even reviewed them.  The clinic will call you with the results, send you a [employer] message, or have you schedule a follow-up clinic or phone time to discuss the results.  Contact our clinics if you do not hear from us in 2 weeks. If further treatment is needed for a skin cancer, this will be done at either our [place] or [place] office.    Who should I call with questions?  [facility]: [phone]  [facility]: [phone]  For urgent  needs outside of business hours call the [facility] at [phone] and ask for the dermatology resident on call           Proper skin care from [facility]:    -Eliminate harsh soaps as they strip the natural oils from the skin, often resulting in dry itchy skin ( i.e. Dial, Zest, Adrienne Spring)  -Use mild soaps such as Cetaphil or Dove Sensitive Skin in the shower. You do not need to use soap on arms, legs, and trunk every time you shower unless visibly soiled.   -Avoid hot or cold showers.  -After showering, lightly dry off and apply moisturizing within 2-3 minutes. This will help trap moisture in the skin.   -Aggressive use of a moisturizer at least 1-2 times a day to the entire body (including -Vanicream, Cetaphil, Aquaphor or Cerave) and moisturize hands after every washing.  -We recommend using moisturizers that come in a tub that needs to be scooped out, not a pump. This has more of an oil base. It will hold moisture in your skin much better than a water base moisturizer. The above recommended are non-pore clogging.      Wear a sunscreen with at least SPF 30 on your face, ears, neck and V of the chest daily. Wear sunscreen on other areas of the body if those areas are exposed to the sun throughout the day. Sunscreens can contain physical and/or chemical blockers. Physical blockers are less likely to clog pores, these include zinc oxide and titanium dioxide. Reapply every two hour and after swimming.     Sunscreen examples: https://www.ewg.org/sunscreen/    UV radiation  UVA radiation remains constant throughout the day and throughout the year. It is a longer wavelength than UVB and therefore penetrates deeper into the skin leading to immediate and delayed tanning, photoaging, and skin cancer. 70-80% of UVA and UVB radiation occurs between the hours of 10am-2pm.  UVB radiation  UVB radiation causes the most harmful effects and is more significant during the summer months. However, snow and ice  can reflect UVB radiation leading to skin damage during the winter months as well. UVB radiation is responsible for tanning, burning, inflammation, delayed erythema (pinkness), pigmentation (brown spots), and skin cancer.     I recommend self monthly full body exams and yearly full body exams with a dermatology provider. If you develop a new or changing lesion please follow up for examination. Most skin cancers are pink and scaly or pink and pearly. However, we do see blue/brown/black skin cancers.  Consider the ABCDEs of melanoma when giving yourself your monthly full body exam ( don't forget the groin, buttocks, feet, toes, etc). A-asymmetry, B-borders, C-color, D-diameter, E-elevation or evolving. If you see any of these changes please follow up in clinic. If you cannot see your back I recommend purchasing a hand held mirror to use with a larger wall mirror.       Checking for Skin Cancer  You can find cancer early by checking your skin each month. There are 3 kinds of skin cancer. They are melanoma, basal cell carcinoma, and squamous cell carcinoma. Doing monthly skin checks is the best way to find new marks or skin changes. Follow the instructions below for checking your skin.   The ABCDEs of checking moles for melanoma   Check your moles or growths for signs of melanoma using ABCDE:   Asymmetry: the sides of the mole or growth don t match  Border: the edges are ragged, notched, or blurred  Color: the color within the mole or growth varies  Diameter: the mole or growth is larger than 6 mm (size of a pencil eraser)  Evolving: the size, shape, or color of the mole or growth is changing (evolving is not shown in the images below)    Checking for other types of skin cancer  Basal cell carcinoma or squamous cell carcinoma have symptoms such as:     A spot or mole that looks different from all other marks on your skin  Changes in how an area feels, such as itching, tenderness, or pain  Changes in the skin's surface,  such as oozing, bleeding, or scaliness  A sore that does not heal  New swelling or redness beyond the border of a mole    Who s at risk?  Anyone can get skin cancer. But you are at greater risk if you have:   Fair skin, light-colored hair, or light-colored eyes  Many moles or abnormal moles on your skin  A history of sunburns from sunlight or tanning beds  A family history of skin cancer  A history of exposure to radiation or chemicals  A weakened immune system  If you have had skin cancer in the past, you are at risk for recurring skin cancer.   How to check your skin  Do your monthly skin checkups in front of a full-length mirror. Check all parts of your body, including your:   Head (ears, face, neck, and scalp)  Torso (front, back, and sides)  Arms (tops, undersides, upper, and lower armpits)  Hands (palms, backs, and fingers, including under the nails)  Buttocks and genitals  Legs (front, back, and sides)  Feet (tops, soles, toes, including under the nails, and between toes)  If you have a lot of moles, take digital photos of them each month. Make sure to take photos both up close and from a distance. These can help you see if any moles change over time.   Most skin changes are not cancer. But if you see any changes in your skin, call your doctor right away. Only he or she can diagnose a problem. If you have skin cancer, seeing your doctor can be the first step toward getting the treatment that could save your life.   [employer] last reviewed this educational content on 4/1/2019 2000-2020 [employer]. [address] [place] [address], [place] [address]. All rights reserved. This information is not intended as a substitute for professional medical care. Always follow your healthcare professional's instructions.       When should I call my doctor?  If you are worsening or not improving, please, contact us or seek urgent care as noted below.     Who should I call with questions (adults)?    [facility]  [facility] [place] [phone]  For urgent needs outside of business hours call the [facility] at [phone] and ask for the dermatology resident on call to be paged  If this is a medical emergency and you are unable to reach an ER, Call 911      If you need a prescription refill, please contact your pharmacy. Refills are approved or denied by our Physicians during normal business hours, Monday through Fridays  Per office policy, refills will not be granted if you have not been seen within the past year (or sooner depending on your child's condition)

## 2024-12-18 LAB
PATH REPORT.COMMENTS IMP SPEC: NORMAL
PATH REPORT.COMMENTS IMP SPEC: NORMAL
PATH REPORT.FINAL DX SPEC: NORMAL
PATH REPORT.GROSS SPEC: NORMAL
PATH REPORT.MICROSCOPIC SPEC OTHER STN: NORMAL
PATH REPORT.RELEVANT HX SPEC: NORMAL

## 2024-12-20 ENCOUNTER — MYC MEDICAL ADVICE (OUTPATIENT)
Dept: DERMATOLOGY | Facility: CLINIC | Age: 74
End: 2024-12-20
Payer: MEDICARE

## 2025-01-06 DIAGNOSIS — C44.310 BCC (BASAL CELL CARCINOMA), FACE: Primary | ICD-10-CM

## 2025-01-06 DIAGNOSIS — C44.41 BASAL CELL CARCINOMA (BCC) OF SCALP: ICD-10-CM

## 2025-01-06 DIAGNOSIS — C44.319 BASAL CELL CARCINOMA (BCC) OF SKIN OF OTHER PART OF FACE: ICD-10-CM

## 2025-01-06 DIAGNOSIS — C44.41 BASAL CELL CARCINOMA (BCC) OF SKIN OF NECK: ICD-10-CM

## 2025-01-06 RX ORDER — IMIQUIMOD 12.5 MG/.25G
CREAM TOPICAL
Qty: 20 PACKET | Refills: 3 | Status: SHIPPED | OUTPATIENT
Start: 2025-01-06

## 2025-01-28 NOTE — PROGRESS NOTES
"  José Antonio Saha is a 71 year old male who is being evaluated via a billable telephone visit.      The patient has been notified of following:     \"This telephone visit will be conducted via a phone call between you and your physician/provider. We have found that certain health care needs can be provided without the need for a physical exam.  This service lets us provide the care you need with a phone conversation.  If a prescription is necessary we can send it directly to your pharmacy.  If lab work is needed we can place an order for that and you can then stop by our lab to have the test done at a later time.\"     Patient has given verbal consent for telephone visit?  Yes    SUBJECTIVE:  José Antonio Saha is an 71 year old male who presents for concern for uti.  His had cloudy urine and seemed worse yesterday.   Has catheter for intermittent catheter use.  Has had a few particles in urine for a few days, but yesterday was thicker and smelled bad.  Collected urine yesterday and dropped it off yesterday.   Has has some sweating and increased BP and some muscle spasm in bladder area.  Started bactrim last night, which he has used before and usually works. No vomiting.  No fevers.  Wanted his sxs documented so his pcp was aware.      PMH:   has a past medical history of Basal cell carcinoma, Malignant melanoma of other specified sites of skin (1980s), Personal history of other malignant neoplasm of skin (4/15/02), Routine general medical examination at a health care facility (4/12/02), and Scrotal varices.  Patient Active Problem List   Diagnosis     Special screening for malignant neoplasm of prostate     CARDIOVASCULAR SCREENING; LDL GOAL LESS THAN 160     Basal cell carcinoma of skin of trunk     Basal cell carcinoma of nose     Sessile colonic polyp     Right shoulder pain, unspecified chronicity     Cervical segment dysfunction     Right supraspinatus tendinitis     Thoracic segment dysfunction     Social " Report given to CHRISTOPH Paris from [facility].   Report method by phone   The following was reviewed with receiving RN:   Current vital signs:  BP (!) 146/103   Pulse 99   Temp 97.8 °F (36.6 °C) (Oral)   Resp 20   Ht 1.575 m (5' 2\")   Wt 104.3 kg (230 lb)   LMP 01/12/2011   SpO2 95%   BMI 42.07 kg/m²                MEWS Score: 2     Any medication or safety alerts were reviewed. Any pending diagnostics and notifications were also reviewed, as well as any safety concerns or issues, abnormal labs, abnormal imaging, and abnormal assessment findings. Questions were answered.       History     Socioeconomic History     Marital status:      Spouse name: Not on file     Number of children: 3     Years of education: Not on file     Highest education level: Not on file   Occupational History     Employer: [employer]   Tobacco Use     Smoking status: Never Smoker     Smokeless tobacco: Never Used   Substance and Sexual Activity     Alcohol use: Yes     Alcohol/week: 0.0 standard drinks     Comment: 2  drinks per week     Drug use: Not Currently     Sexual activity: Not on file   Other Topics Concern     Not on file   Social History Narrative     Not on file     Social Determinants of Health     Financial Resource Strain:      Difficulty of Paying Living Expenses:    Food Insecurity:      Worried About Running Out of Food in the Last Year:      Ran Out of Food in the Last Year:    Transportation Needs:      Lack of Transportation (Medical):      Lack of Transportation (Non-Medical):    Physical Activity:      Days of Exercise per Week:      Minutes of Exercise per Session:    Stress:      Feeling of Stress :    Social Connections:      Frequency of Communication with Friends and Family:      Frequency of Social Gatherings with Friends and Family:      Attends Orthodoxy Services:      Active Member of Clubs or Organizations:      Attends Club or Organization Meetings:      Marital Status:    Intimate Partner Violence:      Fear of Current or Ex-Partner:      Emotionally Abused:      Physically Abused:      Sexually Abused:      Family History   Problem Relation Age of Onset     Cancer Mother         thyroid and lung     Prostate Cancer Father 65        uncle also had prostate Ca     Cerebrovascular Disease Father      Hypertension Father      Arthritis Daughter      Skin Cancer Daughter      Breast Cancer Sister      Cancer - colorectal No family hx of      Diabetes No family hx of        ALLERGIES:  No known drug allergies    Current Outpatient Medications   Medication      Acetaminophen (TYLENOL) 325 MG CAPS     baclofen (LIORESAL) 10 MG tablet     docusate sodium (ENEMEEZ) 283 MG enema     Ethyl Alcohol, Skin Cleanser, 62 % LIQD     saline 0.65 % (Soln) SOLN     senna (SENOKOT) 8.6 MG tablet     sodium chloride (SHANNA 128) 5 % ophthalmic ointment     tolterodine (DETROL) 2 MG tablet     UNABLE TO FIND     Vitamin D, Cholecalciferol, 25 MCG (1000 UT) CAPS     No current facility-administered medications for this visit.         ROS:  ROS is done and is negative for general/constitutional, eye, ENT, Respiratory, cardiovascular, GI, , Skin, musculoskeletal except as noted elsewhere.  All other review of systems negative except as noted elsewhere.      OBJECTIVE:  There were no vitals taken for this visit.  GENERAL : Alert and oriented.  Did not seem to be in distress.   RESP: No respiratory distress detected during phone conversation         ASSESSMENT/PLAN:    ASSESSMENT / PLAN:  (T83.511A,  N39.0) Urinary tract infection associated with catheterization of urinary tract, unspecified indwelling urinary catheter type, initial encounter (H)  (primary encounter diagnosis)  Comment: has h/o catheter use.  And recurrent utis.  Has already brought in urine sample for cx per pcp standing order and has started bactrim he had from pcp to use for uti.  Plan: continue bactrim.  Await cx results and adjust tx if needed. Reviewed medication instructions and side effects. Follow up if experiences side effects.. I reviewed supportive care, otc meds to use if needed, expected course, and signs of concern.  Follow up as needed or if he does not improve within 5 day(s) or if worsens in any way.  Reviewed red flag symptoms and is to go to the ER if experiences any of these.          See [facility] for orders, medications, letters, patient instructions    Falguni Fatima MD  7/17/2021, 9:42 AM      Phone call duration:  11 minutes

## 2025-04-07 ENCOUNTER — OFFICE VISIT (OUTPATIENT)
Dept: DERMATOLOGY | Facility: CLINIC | Age: 75
End: 2025-04-07
Payer: MEDICARE

## 2025-04-07 DIAGNOSIS — C44.310 BCC (BASAL CELL CARCINOMA), FACE: Primary | ICD-10-CM

## 2025-04-07 DIAGNOSIS — C44.41 BASAL CELL CARCINOMA OF SCALP: ICD-10-CM

## 2025-04-07 DIAGNOSIS — C44.41 BASAL CELL CARCINOMA (BCC) OF SKIN OF NECK: ICD-10-CM

## 2025-04-07 PROCEDURE — 1126F AMNT PAIN NOTED NONE PRSNT: CPT | Performed by: STUDENT IN AN ORGANIZED HEALTH CARE EDUCATION/TRAINING PROGRAM

## 2025-04-07 PROCEDURE — 99213 OFFICE O/P EST LOW 20 MIN: CPT | Performed by: STUDENT IN AN ORGANIZED HEALTH CARE EDUCATION/TRAINING PROGRAM

## 2025-04-07 ASSESSMENT — PAIN SCALES - GENERAL: PAINLEVEL_OUTOF10: NO PAIN (0)

## 2025-04-07 NOTE — PROGRESS NOTES
[facility] Dermatology Note  Encounter Date: Apr 7, 2025  Office Visit     Reviewed patients past medical history and pertinent chart review prior to patients visit today.     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 No further treatment needed. I recommend continued observation. We will plan to re-evaluate these sites at his next full body skin check. Should symptoms or changes develop related to this condition, I would recommend a return visit for reassessment.      Follow up: December of 2025 for follow up full body skin exam, as needed for new or changing lesions or new concerns      All risks, benefits and alternatives were discussed with patient.  Patient is in agreement and understands the assessment and plan.  All questions were answered.  Veena Sequeira PA-C  [facility]  Dermatology  _______________________________________    CC: Derm Problem (Follow-up on three spots treated with imiquimod, patient feels there has been great improvement)     HPI:  Mr. José Antonio Saha is a(n) 75 year old male who presents today as a return patient for biopsy-proven superficial and nodular basal cell carcinoma involving the mid frontal scalp, left cheek, and left anterior neck on 12/16/2024.  At that time, patient elected to pursue treatment with topical imiquimod.    Today, he presents for follow-up and reports he had a robust reaction with the topical imiquimod at the left cheek and left anterior neck. He has photos demonstrating significant crusting during treatment. He had no reaction with the mid frontal scalp. He stopped treatment after roughly 5 to 5 1/2 weeks. He feels the sites have healed well. He has no specific concerns with the affected areas at this time.    Patient is otherwise feeling well, without additional skin concerns.    Physical Exam:  SKIN: Focused examination of mid frontal scalp, left cheek, and left anterior neck was performed.  - There is a slightly hypertrophic scar at the left anterior neck and a well healed scar involving the mid frontal scalp and left cheek without evidence of recurrence, nodularity, or tenderness to palpation    - No other lesions of concern on areas examined.     Medications:  Current Outpatient Medications   Medication Sig Dispense Refill    Acetaminophen 325 MG CAPS Take 325-650 mg by mouth as needed      baclofen (LIORESAL) 10 MG tablet >>>IF PUMP FAILS  Take 10 mg six times a day IF PUMP FAILS<<<      buPROPion (WELLBUTRIN XL) 150 MG 24 hr tablet TAKE 1 TABLET BY MOUTH EVERY MORNING 90 tablet 3    Ethyl Alcohol, Skin Cleanser, 62 % LIQD Apply to hands as needed prior to self cath. 473 mL 1    LINZESS 72 MCG capsule Take 72 mcg by mouth every morning (before breakfast)      Naltrexone HCl, Pain, (LOTREXONE) 1.5 MG CAPS Take 4.5 mg by mouth daily       NITRO-BID 2 % OINT ointment Place onto the skin as needed.      polyethylene glycol (MIRALAX) 17 g packet Take 1 packet by mouth daily      psyllium (METAMUCIL/KONSYL) Packet Take 1 packet by mouth daily      Sennosides-Docusate Sodium 8.6-50 MG CAPS Take 2 capsules by mouth daily      sertraline (ZOLOFT) 25 MG tablet One-half tablet once daily for one week, then increase to one whole tablet daily 90 tablet 3    sodium chloride (SHANNA 128) 5 % ophthalmic ointment 1 Application as needed for dry eyes      Vitamin D, Cholecalciferol, 25 MCG (1000 UT) CAPS Take by mouth daily       imiquimod (ALDARA) 5 % external cream Apply to the affected area on mid frontal scalp, left cheek, and left anterior neck 5 times weekly (Mon-Fri) at bedtime for a total of 6 weeks. (Patient not taking: Reported on 4/7/2025) 20 packet 3     No current facility-administered medications for this visit.      Past Medical History:   Patient Active Problem List   Diagnosis    Special screening for malignant neoplasm of prostate    CARDIOVASCULAR SCREENING; LDL GOAL LESS THAN 160    Basal cell carcinoma of skin of trunk    Basal cell carcinoma of nose    Sessile colonic polyp    Right shoulder pain, unspecified chronicity    Cervical segment dysfunction    Right supraspinatus tendinitis    Thoracic segment dysfunction    Pancreas cyst    Prostate cancer (H)    H/O spinal cord injury    Chronic incomplete spastic tetraplegia (H)    Adjustment disorder with depressed mood     Past Medical History:   Diagnosis Date    Basal cell carcinoma     Malignant melanoma of other specified sites of skin 1980s    no recurrence    Personal history of other malignant neoplasm of skin 4/15/02    recurrent BCC scalp and face    Routine general medical examination at a health care facility 4/12/02    Scrotal varices     L scrotum       CC Referred Self, MD  No address on file on close of this encounter.

## 2025-04-07 NOTE — LETTER
4/7/2025      José Antonio Saha  [address]  [place] [address]      Dear Colleague,    Thank you for referring your patient, José Antonio Saha, to the [facility]. Please see a copy of my visit note below.    [facility] Dermatology Note  Encounter Date: Apr 7, 2025  Office Visit     Reviewed patients past medical history and pertinent chart review prior to patients visit today.     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 No further treatment needed. I recommend continued observation. We will plan to re-evaluate these sites at his next full body skin check. Should symptoms or changes develop related to this condition, I would recommend a return visit for reassessment.      Follow up: December of 2025 for follow up full body skin exam, as needed for new or changing lesions or new concerns       All risks, benefits and alternatives were discussed with patient.  Patient is in agreement and understands the assessment and plan.  All questions were answered.  Veena Sequeira PA-C  [facility] Dermatology  _______________________________________    CC: Derm Problem (Follow-up on three spots treated with imiquimod, patient feels there has been great improvement)     HPI:  Mr. José Antonio aSha is a(n) 75 year old male who presents today as a return patient for biopsy-proven superficial and nodular basal cell carcinoma involving the mid frontal scalp, left cheek, and left anterior neck on 12/16/2024.  At that time, patient elected to pursue treatment with topical imiquimod.    Today, he presents for follow-up and reports he had a robust reaction with the topical imiquimod at the left cheek and left anterior neck. He has photos demonstrating significant crusting during treatment. He had no reaction with the mid frontal scalp. He stopped treatment after roughly 5 to 5 1/2 weeks. He feels the sites have healed well. He has no specific concerns with the affected areas at this time.    Patient is otherwise feeling well, without additional skin concerns.    Physical Exam:  SKIN: Focused examination of mid frontal scalp, left cheek, and left anterior neck was performed.  - There is a slightly hypertrophic scar at the left anterior neck and a well healed scar involving the mid frontal scalp and left cheek without evidence of recurrence, nodularity, or tenderness to palpation    - No other lesions of concern on areas examined.     Medications:  Current Outpatient Medications   Medication Sig Dispense Refill     Acetaminophen 325 MG CAPS Take 325-650 mg by mouth as needed       baclofen (LIORESAL) 10 MG tablet >>>IF PUMP FAILS  Take 10 mg six times a day IF PUMP FAILS<<<       buPROPion (WELLBUTRIN XL) 150 MG 24 hr tablet TAKE 1 TABLET BY MOUTH EVERY MORNING 90 tablet 3     Ethyl Alcohol, Skin Cleanser, 62 % LIQD  Apply to hands as needed prior to self cath. 473 mL 1     LINZESS 72 MCG capsule Take 72 mcg by mouth every morning (before breakfast)       Naltrexone HCl, Pain, (LOTREXONE) 1.5 MG CAPS Take 4.5 mg by mouth daily       NITRO-BID 2 % OINT ointment Place onto the skin as needed.       polyethylene glycol (MIRALAX) 17 g packet Take 1 packet by mouth daily       psyllium (METAMUCIL/KONSYL) Packet Take 1 packet by mouth daily       Sennosides-Docusate Sodium 8.6-50 MG CAPS Take 2 capsules by mouth daily       sertraline (ZOLOFT) 25 MG tablet One-half tablet once daily for one week, then increase to one whole tablet daily 90 tablet 3     sodium chloride (SHANNA 128) 5 % ophthalmic ointment 1 Application as needed for dry eyes       Vitamin D, Cholecalciferol, 25 MCG (1000 UT) CAPS Take by mouth daily        imiquimod (ALDARA) 5 % external cream Apply to the affected area on mid frontal scalp, left cheek, and left anterior neck 5 times weekly (Mon-Fri) at bedtime for a total of 6 weeks. (Patient not taking: Reported on 4/7/2025) 20 packet 3     No current facility-administered medications for this visit.      Past Medical History:   Patient Active Problem List   Diagnosis     Special screening for malignant neoplasm of prostate     CARDIOVASCULAR SCREENING; LDL GOAL LESS THAN 160     Basal cell carcinoma of skin of trunk     Basal cell carcinoma of nose     Sessile colonic polyp     Right shoulder pain, unspecified chronicity     Cervical segment dysfunction     Right supraspinatus tendinitis     Thoracic segment dysfunction     Pancreas cyst     Prostate cancer (H)     H/O spinal cord injury     Chronic incomplete spastic tetraplegia (H)     Adjustment disorder with depressed mood     Past Medical History:   Diagnosis Date     Basal cell carcinoma      Malignant melanoma of other specified sites of skin 1980s    no recurrence     Personal history of other malignant neoplasm of skin 4/15/02    recurrent BCC scalp and face      Routine general medical examination at a [facility] facility 4/12/02     Scrotal varices     L scrotum       CC Referred Self, MD  No address on file on close of this encounter.       Again, thank you for allowing me to participate in the care of your patient.        Sincerely,        Sherri Sequeira PA-C    Electronically signed

## 2025-07-28 ENCOUNTER — OFFICE VISIT (OUTPATIENT)
Dept: FAMILY MEDICINE | Facility: CLINIC | Age: 75
End: 2025-07-28
Payer: MEDICARE

## 2025-07-28 VITALS
SYSTOLIC BLOOD PRESSURE: 115 MMHG | BODY MASS INDEX: 22.08 KG/M2 | RESPIRATION RATE: 17 BRPM | DIASTOLIC BLOOD PRESSURE: 75 MMHG | TEMPERATURE: 97.5 F | HEIGHT: 72 IN | OXYGEN SATURATION: 96 % | WEIGHT: 163 LBS | HEART RATE: 58 BPM

## 2025-07-28 DIAGNOSIS — Z13.1 DIABETES MELLITUS SCREENING: ICD-10-CM

## 2025-07-28 DIAGNOSIS — K86.2 PANCREAS CYST: ICD-10-CM

## 2025-07-28 DIAGNOSIS — G82.50 CHRONIC INCOMPLETE SPASTIC TETRAPLEGIA (H): ICD-10-CM

## 2025-07-28 DIAGNOSIS — Z00.00 ENCOUNTER FOR MEDICARE ANNUAL WELLNESS EXAM: Primary | ICD-10-CM

## 2025-07-28 DIAGNOSIS — C61 PROSTATE CANCER (H): ICD-10-CM

## 2025-07-28 PROBLEM — M75.91 RIGHT SUPRASPINATUS TENDINITIS: Status: RESOLVED | Noted: 2018-05-07 | Resolved: 2025-07-28

## 2025-07-28 PROBLEM — M99.02 THORACIC SEGMENT DYSFUNCTION: Status: RESOLVED | Noted: 2018-05-07 | Resolved: 2025-07-28

## 2025-07-28 PROBLEM — F43.21 ADJUSTMENT DISORDER WITH DEPRESSED MOOD: Status: RESOLVED | Noted: 2022-06-20 | Resolved: 2025-07-28

## 2025-07-28 PROBLEM — M25.511 RIGHT SHOULDER PAIN, UNSPECIFIED CHRONICITY: Status: RESOLVED | Noted: 2018-05-07 | Resolved: 2025-07-28

## 2025-07-28 PROBLEM — M99.01 CERVICAL SEGMENT DYSFUNCTION: Status: RESOLVED | Noted: 2018-05-07 | Resolved: 2025-07-28

## 2025-07-28 LAB
ALBUMIN SERPL BCG-MCNC: 4.2 G/DL (ref 3.5–5.2)
ALP SERPL-CCNC: 46 U/L (ref 40–150)
ALT SERPL W P-5'-P-CCNC: 21 U/L (ref 0–70)
ANION GAP SERPL CALCULATED.3IONS-SCNC: 9 MMOL/L (ref 7–15)
AST SERPL W P-5'-P-CCNC: 24 U/L (ref 0–45)
BILIRUB SERPL-MCNC: 0.3 MG/DL
BUN SERPL-MCNC: 13.4 MG/DL (ref 8–23)
CALCIUM SERPL-MCNC: 9.2 MG/DL (ref 8.8–10.4)
CHLORIDE SERPL-SCNC: 103 MMOL/L (ref 98–107)
CHOLEST SERPL-MCNC: 177 MG/DL
CREAT SERPL-MCNC: 0.64 MG/DL (ref 0.67–1.17)
EGFRCR SERPLBLD CKD-EPI 2021: >90 ML/MIN/1.73M2
ERYTHROCYTE [DISTWIDTH] IN BLOOD BY AUTOMATED COUNT: 13 % (ref 10–15)
EST. AVERAGE GLUCOSE BLD GHB EST-MCNC: 114 MG/DL
FASTING STATUS PATIENT QL REPORTED: YES
FASTING STATUS PATIENT QL REPORTED: YES
GLUCOSE SERPL-MCNC: 103 MG/DL (ref 70–99)
HBA1C MFR BLD: 5.6 % (ref 0–5.6)
HCO3 SERPL-SCNC: 26 MMOL/L (ref 22–29)
HCT VFR BLD AUTO: 40.4 % (ref 40–53)
HDLC SERPL-MCNC: 81 MG/DL
HGB BLD-MCNC: 13.8 G/DL (ref 13.3–17.7)
LDLC SERPL CALC-MCNC: 76 MG/DL
MCH RBC QN AUTO: 31.7 PG (ref 26.5–33)
MCHC RBC AUTO-ENTMCNC: 34.2 G/DL (ref 31.5–36.5)
MCV RBC AUTO: 93 FL (ref 78–100)
NONHDLC SERPL-MCNC: 96 MG/DL
PLATELET # BLD AUTO: 234 10E3/UL (ref 150–450)
POTASSIUM SERPL-SCNC: 4.2 MMOL/L (ref 3.4–5.3)
PROT SERPL-MCNC: 6.7 G/DL (ref 6.4–8.3)
RBC # BLD AUTO: 4.36 10E6/UL (ref 4.4–5.9)
SODIUM SERPL-SCNC: 138 MMOL/L (ref 135–145)
TRIGL SERPL-MCNC: 100 MG/DL
WBC # BLD AUTO: 6.3 10E3/UL (ref 4–11)

## 2025-07-28 PROCEDURE — 3044F HG A1C LEVEL LT 7.0%: CPT | Performed by: INTERNAL MEDICINE

## 2025-07-28 PROCEDURE — 3078F DIAST BP <80 MM HG: CPT | Performed by: INTERNAL MEDICINE

## 2025-07-28 PROCEDURE — 84153 ASSAY OF PSA TOTAL: CPT | Performed by: INTERNAL MEDICINE

## 2025-07-28 PROCEDURE — 3074F SYST BP LT 130 MM HG: CPT | Performed by: INTERNAL MEDICINE

## 2025-07-28 PROCEDURE — 83036 HEMOGLOBIN GLYCOSYLATED A1C: CPT | Performed by: INTERNAL MEDICINE

## 2025-07-28 PROCEDURE — 80053 COMPREHEN METABOLIC PANEL: CPT | Performed by: INTERNAL MEDICINE

## 2025-07-28 PROCEDURE — 1125F AMNT PAIN NOTED PAIN PRSNT: CPT | Performed by: INTERNAL MEDICINE

## 2025-07-28 PROCEDURE — 80061 LIPID PANEL: CPT | Performed by: INTERNAL MEDICINE

## 2025-07-28 PROCEDURE — 36415 COLL VENOUS BLD VENIPUNCTURE: CPT | Performed by: INTERNAL MEDICINE

## 2025-07-28 PROCEDURE — 85027 COMPLETE CBC AUTOMATED: CPT | Performed by: INTERNAL MEDICINE

## 2025-07-28 PROCEDURE — G0438 PPPS, INITIAL VISIT: HCPCS | Performed by: INTERNAL MEDICINE

## 2025-07-28 SDOH — HEALTH STABILITY: PHYSICAL HEALTH: ON AVERAGE, HOW MANY DAYS PER WEEK DO YOU ENGAGE IN MODERATE TO STRENUOUS EXERCISE (LIKE A BRISK WALK)?: 5 DAYS

## 2025-07-28 ASSESSMENT — SOCIAL DETERMINANTS OF HEALTH (SDOH): HOW OFTEN DO YOU GET TOGETHER WITH FRIENDS OR RELATIVES?: TWICE A WEEK

## 2025-07-28 ASSESSMENT — PAIN SCALES - GENERAL: PAINLEVEL_OUTOF10: MILD PAIN (3)

## 2025-07-28 NOTE — PROGRESS NOTES
Preventive Care Visit  [facility]  Kirk Chavez MD, Internal Medicine  Jul 28, 2025      Assessment & Plan     Encounter for Medicare annual wellness exam    - Comprehensive metabolic panel; Future  - CBC with platelets; Future  - Lipid panel reflex to direct LDL Fasting; Future  - Comprehensive metabolic panel  - CBC with platelets  - Lipid panel reflex to direct LDL Fasting    Chronic incomplete spastic tetraplegia (H)  Continue follow-up with spinal cord injury specialty team at [facility]    Prostate cancer (H)  Recheck PSA, taking active surveillance position currently    Pancreas cyst  He declines additional surveillance of this pancreas cysts as he would not elect to have a Whipple procedure should there be imaging progression of the finding    Diabetes mellitus screening    - HEMOGLOBIN A1C; Future  - HEMOGLOBIN A1C  Patient has been advised of split billing requirements and indicates understanding: Yes    Counseling  Appropriate preventive services were addressed with this patient via screening, questionnaire, or discussion as appropriate for fall prevention, nutrition, physical activity, Tobacco-use cessation, social engagement, weight loss and cognition.  Checklist reviewing preventive services available has been given to the patient.  Reviewed patient's diet, addressing concerns and/or questions.   Updated plan of care.  Patient reported difficulty with activities of daily living were addressed today.Reviewed preventive health counseling, as reflected in patient instructions  Special attention given to:          Healthy diet/nutrition       Immunizations  He declined a COVID-vaccine today    Follow-up    Follow-up Visit   Expected date:  Aug 04, 2026 (Approximate)      Follow Up Appointment Details:     Follow-up with whom?: PCP    Follow-Up for what?: Medicare Wellness    Welcome or Annual?: Annual Wellness    How?: In Person                 Slava   Tawanda is a 75 year old,  presenting for the following:  Physical        7/28/2025     2:16 PM   Additional Questions   Roomed by Javier   Accompanied by Steffanie Boudreaux         HPI           Advance Care Planning    Discussed advance care planning with patient; informed AVS has link to [facility].        7/28/2025   General Health   How would you rate your overall physical health? (!) FAIR   Feel stress (tense, anxious, or unable to sleep) Only a little   (!) STRESS CONCERN      7/28/2025   Nutrition   Diet: Regular (no restrictions)         7/28/2025   Exercise   Days per week of moderate/strenous exercise 5 days         7/28/2025   Social Factors   Frequency of gathering with friends or relatives Twice a week   Worry food won't last until get money to buy more No   Food not last or not have enough money for food? No   Do you have housing? (Housing is defined as stable permanent housing and does not include staying outside in a car, in a tent, in an abandoned building, in an overnight shelter, or couch-surfing.) Yes   Are you worried about losing your housing? No   Lack of transportation? No   Unable to get utilities (heat,electricity)? No         7/28/2025   Fall Risk   Fallen 2 or more times in the past year? No    No   Trouble with walking or balance? Yes    Yes       Multiple values from one day are sorted in reverse-chronological order           7/28/2025   Activities of Daily Living- Home Safety   Needs help with the following daily activites Transportation    Shopping    Preparing meals    Housework    Bathing    Laundry    Dressing   Do you have the help that you need? Yes for Some   Safety concerns in the home None of the above       Multiple values from one day are sorted in reverse-chronological order         7/28/2025   Dental   Dentist two times every year? Yes         7/28/2025   Hearing Screening   Hearing concerns? None of the above         7/28/2025   Driving Risk Screening   Patient/family members have concerns about  driving No         7/28/2025   General Alertness/Fatigue Screening   Have you been more tired than usual lately? No         7/28/2025   Urinary Incontinence Screening   Bothered by leaking urine in past 6 months No         Today's PHQ-2 Score:       7/28/2025     2:16 PM   PHQ-2 ( 1999 [employer])   Q1: Little interest or pleasure in doing things 0   Q2: Feeling down, depressed or hopeless 0   PHQ-2 Score 0    Q1: Little interest or pleasure in doing things Not at all   Q2: Feeling down, depressed or hopeless Not at all   PHQ-2 Score 0       Patient-reported           7/28/2025   Substance Use   Alcohol more than 3/day or more than 7/wk No   Do you have a current opioid prescription? No   How severe/bad is pain from 1 to 10? 3/10   Do you use any other substances recreationally? No     Social History     Tobacco Use    Smoking status: Never    Smokeless tobacco: Never   Vaping Use    Vaping status: Never Used   Substance Use Topics    Alcohol use: Yes     Alcohol/week: 0.0 standard drinks of alcohol     Comment: 2  drinks per week    Drug use: Not Currently           7/28/2025   AAA Screening   Family history of Abdominal Aortic Aneurysm (AAA)? No   ASCVD Risk   The ASCVD Risk score ([place] DK, et al., 2019) failed to calculate for the following reasons:    The systolic blood pressure is missing            Reviewed and updated as needed this visit by Provider                    Past Medical History:   Diagnosis Date    Basal cell carcinoma     Malignant melanoma of other specified sites of skin 1980s    no recurrence    Personal history of other malignant neoplasm of skin 4/15/02    recurrent BCC scalp and face    Routine general medical examination at a health care facility 4/12/02    Scrotal varices     L scrotum     Past Surgical History:   Procedure Laterality Date    COLONOSCOPY  11/19/2013    Procedure: COMBINED COLONOSCOPY, SINGLE BIOPSY/POLYPECTOMY BY BIOPSY;  COLONOSCOPY;  Surgeon: Adam Lopez MD;   Location:  GI    COLONOSCOPY N/A 9/29/2016    Procedure: COLONOSCOPY;  Surgeon: Paul Ken MD;  Location:  GI    IR LUMBAR PUNCTURE  10/29/2020    [facility] NONSPECIFIC PROCEDURE  6/27/02    colonoscopy with polypectomy x3, fulguration x1    [facility] NONSPECIFIC PROCEDURE      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    [facility] NONSPECIFIC PROCEDURE      multiple skin Bxs    [facility] NONSPECIFIC PROCEDURE      tonsillectomy     BP Readings from Last 3 Encounters:   07/28/25 115/75   07/22/24 (!) 152/92   02/26/24 118/77    Wt Readings from Last 3 Encounters:   07/28/25 73.9 kg (163 lb)   07/22/24 77.1 kg (170 lb)   07/17/23 77.1 kg (170 lb)                  Patient Active Problem List   Diagnosis    Basal cell carcinoma of skin of trunk    Basal cell carcinoma of nose    Sessile colonic polyp    Pancreas cyst    Prostate cancer (H)    H/O spinal cord injury    Chronic incomplete spastic tetraplegia (H)     Past Surgical History:   Procedure Laterality Date    COLONOSCOPY  11/19/2013    Procedure: COMBINED COLONOSCOPY, SINGLE BIOPSY/POLYPECTOMY BY BIOPSY;  COLONOSCOPY;  Surgeon: Adam Lopez MD;  Location:  GI    COLONOSCOPY N/A 9/29/2016    Procedure: COLONOSCOPY;  Surgeon: Paul Ken MD;  Location:  GI    IR LUMBAR PUNCTURE  10/29/2020    [facility] NONSPECIFIC PROCEDURE  6/27/02    colonoscopy with polypectomy x3, fulguration x1    [facility] NONSPECIFIC PROCEDURE      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    [facility] NONSPECIFIC PROCEDURE      multiple skin Bxs    [facility] NONSPECIFIC PROCEDURE      tonsillectomy       Social History     Tobacco Use    Smoking status: Never    Smokeless tobacco: Never   Substance Use Topics    Alcohol use: Yes     Alcohol/week: 0.0 standard drinks of alcohol     Comment: 2  drinks per week     Family History   Problem Relation Age of Onset    Cancer Mother         thyroid and lung    Prostate Cancer Father 65        uncle also had prostate Ca    Cerebrovascular Disease Father     Hypertension  Father     Breast Cancer Sister     Skin Cancer Maternal Grandmother     Arthritis Daughter     Skin Cancer Daughter     Cancer - colorectal No family hx of     Diabetes No family hx of          Current Outpatient Medications   Medication Sig Dispense Refill    Acetaminophen 325 MG CAPS Take 325-650 mg by mouth as needed      baclofen (LIORESAL) 10 MG tablet >>>IF PUMP FAILS  Take 10 mg six times a day IF PUMP FAILS<<<      buPROPion (WELLBUTRIN XL) 150 MG 24 hr tablet TAKE 1 TABLET BY MOUTH EVERY MORNING 90 tablet 3    Ethyl Alcohol, Skin Cleanser, 62 % LIQD Apply to hands as needed prior to self cath. 473 mL 1    imiquimod (ALDARA) 5 % external cream Apply to the affected area on mid frontal scalp, left cheek, and left anterior neck 5 times weekly (Mon-Fri) at bedtime for a total of 6 weeks. (Patient not taking: Reported on 4/7/2025) 20 packet 3    LINZESS 72 MCG capsule Take 72 mcg by mouth every morning (before breakfast)      Naltrexone HCl, Pain, (LOTREXONE) 1.5 MG CAPS Take 4.5 mg by mouth daily      NITRO-BID 2 % OINT ointment Place onto the skin as needed.      polyethylene glycol (MIRALAX) 17 g packet Take 1 packet by mouth daily      psyllium (METAMUCIL/KONSYL) Packet Take 1 packet by mouth daily      Sennosides-Docusate Sodium 8.6-50 MG CAPS Take 2 capsules by mouth daily      sertraline (ZOLOFT) 25 MG tablet One-half tablet once daily for one week, then increase to one whole tablet daily 90 tablet 3    sodium chloride (SHANNA 128) 5 % ophthalmic ointment 1 Application as needed for dry eyes      Vitamin D, Cholecalciferol, 25 MCG (1000 UT) CAPS Take by mouth daily        Current providers sharing in care for this patient include:  Patient Care Team:  Kirk Chavez MD as PCP - General (Internal Medicine)  Kirk Chavez MD as Assigned PCP  Shawn Holcomb MD as MD (Dermatology)  Sherri Sequeira PA-C as Physician Assistant  Sherri Sequeira PA-C as Assigned Dermatology Provider    The  following health maintenance items are reviewed in Epic and correct as of today:  Health Maintenance   Topic Date Due    COVID-19 VACCINE (8 - 2024-25 season) 09/01/2024    ANNUAL REVIEW OF HM ORDERS  07/22/2025    MEDICARE ANNUAL WELLNESS VISIT  07/22/2025    INFLUENZA VACCINE (1) 09/01/2025    FALL RISK ASSESSMENT  07/28/2026    RSV VACCINE (1 - 1-dose 75+ series) 01/01/2027    DIABETES SCREENING  07/22/2027    COLORECTAL CANCER SCREENING  06/04/2029    DTAP/TDAP/TD VACCINE (3 - Td or Tdap) 06/11/2029    LIPID  07/22/2029    ADVANCE CARE PLANNING  07/22/2029    HEPATITIS C SCREENING  Completed    PHQ-2 (once per calendar year)  Completed    PNEUMOCOCCAL VACCINE 50+ YEARS  Completed    HPV VACCINE (No Doses Required) Completed    ZOSTER VACCINE  Completed    MENINGITIS VACCINE  Aged Out       A 10 organ systems ROS is negative other than any pertinent positives or negatives previously stated.      Objective    Exam  There were no vitals taken for this visit.   Estimated body mass index is 23.06 kg/m  as calculated from the following:    Height as of 7/22/24: 1.829 m (6').    Weight as of 7/22/24: 77.1 kg (170 lb).    Physical Exam  GENERAL: alert and no distress  EYES: Eyes grossly normal to inspection, PERRL and conjunctivae and sclerae normal  HENT: ear canals and TM's normal, nose and mouth without ulcers or lesions  NECK: no adenopathy, no asymmetry, masses, or scars  RESP: lungs clear to auscultation - no rales, rhonchi or wheezes  CV: regular rate and rhythm, normal S1 S2, no S3 or S4, no murmur, click or rub, no peripheral edema  ABDOMEN: soft, nontender, no hepatosplenomegaly, no masses and bowel sounds normal  SKIN: no suspicious lesions or rashes  NEURO: Alert and oriented to person place and time, cranial nerves II to XII appear grossly intact, less muscle spasm than previous exams, in motorized wheelchair  PSYCH: mentation appears normal, affect normal/bright improved affect from previous exams         7/22/2024   Mini Cog   Clock Draw Score 2 Normal   3 Item Recall 1 object recalled   Mini Cog Total Score 3              Signed Electronically by: Kirk Chavez MD

## 2025-07-28 NOTE — PATIENT INSTRUCTIONS
"Patient Education   Preventive Care Advice   This is general advice we often give to help people stay healthy. Your care team may have specific advice just for you. Please talk to your care team about your own preventive care needs.  Lifestyle  Exercise at least 150 minutes each week (30 minutes a day, 5 days a week).  Do muscle strengthening activities 2 days a week. These help control your weight and prevent disease.  No smoking.  Wear sunscreen to prevent skin cancer.  Take time with family and friends.  Have your home tested for radon every 2 to 5 years. Radon is a colorless, odorless gas that can harm your lungs. To learn more, go to www.[email or web address].[place].us and search for \"Radon in Homes.\"  Keep guns unloaded and locked up in a safe place like a safe or gun vault, or, use a gun lock and hide the keys. Always lock away bullets separately. To learn more, visit [employer].[place].gov and search for \"safe gun storage.\"  Nutrition  Eat 5 or more servings of fruits and vegetables each day.  Try wheat bread, brown rice and whole grain pasta (instead of white bread, rice, and pasta).  Get enough calcium and vitamin D. Check the label on foods and aim for 100% of the RDA (recommended daily allowance).  Regular exams  Have a dental exam and cleaning every 6 months.  Older adults: Ask your care team how often to have memory testing.  See your health care team every year to talk about:  Any changes in your health.  Any medicines your care team has prescribed.  Preventive care, family planning, and ways to prevent chronic diseases.  Shots (vaccines)   HPV shots (up to age 26), if you've never had them before.  Hepatitis B shots (up to age 59), if you've never had them before.  COVID-19 shot: Get this shot when it's due.  Flu shot: Get a flu shot every year.  Tetanus shot: Get a tetanus shot every 10 years.  Pneumococcal, hepatitis A, and RSV shots: Ask your care team if you need these based on your risk.  Shingles shot (for age 50 and " up).  General health tests  Diabetes screening:  Starting at age 35, Get screened for diabetes at least every 3 years.  If you are younger than age 35, ask your care team if you should be screened for diabetes.  Cholesterol test: At age 39, start having a cholesterol test every 5 years, or more often if advised.  Bone density scan (DEXA): At age 50, ask your care team if you should have this scan for osteoporosis (brittle bones).  Hepatitis C: Get tested at least once in your life.  Abdominal aortic aneurysm screening: Talk to your doctor about having this screening if you:  Have ever smoked; and  Are biologically male; and  Are between the ages of 65 and 75.  STIs (sexually transmitted infections)  Before age 24: Ask your care team if you should be screened for STIs.  After age 24: Get screened for STIs if you're at risk. You are at risk for STIs (including HIV) if:  You are sexually active with more than one person.  You don't use condoms every time.  You or a partner was diagnosed with a sexually transmitted infection.  If you are at risk for HIV, ask about PrEP medicine to prevent HIV.  Get tested for HIV at least once in your life, whether you are at risk for HIV or not.  Cancer screening tests  Cervical cancer screening: If you have a cervix, begin getting regular cervical cancer screening tests at age 21. Most people who have regular screenings with normal results can stop after age 65. Talk about this with your provider.  Breast cancer scan (mammogram): If you've ever had breasts, begin having regular mammograms starting at age 40. This is a scan to check for breast cancer.  Colon cancer screening: It is important to start screening for colon cancer at age 45.  Have a colonoscopy test every 10 years (or more often if you're at risk) Or, ask your provider about stool tests like a FIT test every year or Cologuard test every 3 years.  To learn more about your testing options, visit:  [email or web address].  For help making a decision, visit: frances.meri/cq72596.  Prostate cancer screening test: If you have a prostate and are age 55 to 69, ask your provider if you would benefit from a yearly prostate cancer screening test.  Lung cancer screening: If you are a current or former smoker age 50 to 80, ask your care team if ongoing lung cancer screenings are right for you.    For informational purposes only. Not to replace the advice of your health care provider. Copyright   2023 [facility] [employer]. All rights reserved. Clinically reviewed by the [facility]. Zounds 184382 - REV 6/25.  Learning About Activities of Daily Living  What are activities of daily living?     Activities of daily living (ADLs) are the basic self-care tasks you do every day. These include eating, bathing, dressing, and moving around.  As you age, and if you have health problems, you may find that it's harder to do some of these tasks. If so, your doctor can suggest ideas that may help.  To measure what kind of help you may need, your doctor will ask how well you are able to do ADLs. Let your doctor know if there are any tasks that you are having trouble doing. This is an important first step to getting help. And when you have the help you need, you can stay as independent as possible.  How will a doctor assess your ADLs?  Asking about ADLs is part of a routine health checkup your doctor will likely do as you age. Your health check might be done in a doctor's office, in your home, or at a hospital. The goal is to find out if you are having any problems that could make it hard to care for yourself or that make it unsafe for you to be on your own.  To measure your ADLs, your doctor will ask how hard it is for you to do routine tasks. Your doctor may also want to know if you have changed the way you do a task because of a health problem. Your doctor may watch how you:  Walk back and forth.  Keep  your balance while you stand or walk.  Move from sitting to standing or from a bed to a chair.  Button or unbutton a shirt or sweater.  Remove and put on your shoes.  It's common to feel a little worried or anxious if you find you can't do all the things you used to be able to do. Talking with your doctor about ADLs is a way to make sure you're as safe as possible and able to care for yourself as well as you can. You may want to bring a caregiver, friend, or family member to your checkup. They can help you talk to your doctor.  Follow-up care is a key part of your treatment and safety. Be sure to make and go to all appointments, and call your doctor if you are having problems. It's also a good idea to know your test results and keep a list of the medicines you take.  Current as of: October 24, 2024  Content Version: 14.5    2559-6656 [employer].   Care instructions adapted under license by your healthcare professional. If you have questions about a medical condition or this instruction, always ask your healthcare professional. [employer] disclaims any warranty or liability for your use of this information.    Preventing Falls: Care Instructions  Injuries and health problems such as trouble walking or poor eyesight can increase your risk of falling. So can some medicines. But there are things you can do to help prevent falls. You can exercise to get stronger. You can also arrange your home to make it safer.    Talk to your doctor about the medicines you take. Ask if any of them increase the risk of falls and whether they can be changed or stopped.   Try to exercise regularly. It can help improve your strength and balance. This can help lower your risk of falling.         Practice fall safety and prevention.   Wear low-heeled shoes that fit well and give your feet good support. Talk to your doctor if you have foot problems that make this hard.  Carry a cellphone or wear a medical alert device  "that you can use to call for help.  Use stepladders instead of chairs to reach high objects. Don't climb if you're at risk for falls. Ask for help, if needed.  Wear the correct eyeglasses, if you need them.        Make your home safer.   Remove rugs, cords, clutter, and furniture from walkways.  Keep your house well lit. Use night-lights in hallways and bathrooms.  Install and use sturdy handrails on stairways.  Wear nonskid footwear, even inside. Don't walk barefoot or in socks without shoes.        Be safe outside.   Use handrails, curb cuts, and ramps whenever possible.  Keep your hands free by using a shoulder bag or backpack.  Try to walk in well-lit areas. Watch out for uneven ground, changes in pavement, and debris.  Be careful in the winter. Walk on the grass or gravel when sidewalks are slippery. Use de-icer on steps and walkways. Add non-slip devices to shoes.    Put grab bars and nonskid mats in your shower or tub and near the toilet. Try to use a shower chair or bath bench when bathing.   Get into a tub or shower by putting in your weaker leg first. Get out with your strong side first. Have a phone or medical alert device in the bathroom with you.   Where can you learn more?  Go to https://www.[employer].net/patiented  Enter G117 in the search box to learn more about \"Preventing Falls: Care Instructions.\"  Current as of: July 31, 2024  Content Version: 14.5    1075-4619 [employer].   Care instructions adapted under license by your healthcare professional. If you have questions about a medical condition or this instruction, always ask your healthcare professional. [employer] disclaims any warranty or liability for your use of this information.       "

## 2025-07-29 LAB — PSA SERPL DL<=0.01 NG/ML-MCNC: 13.8 NG/ML (ref 0–6.5)
